# Patient Record
Sex: MALE | Race: WHITE | NOT HISPANIC OR LATINO | URBAN - METROPOLITAN AREA
[De-identification: names, ages, dates, MRNs, and addresses within clinical notes are randomized per-mention and may not be internally consistent; named-entity substitution may affect disease eponyms.]

---

## 2018-07-30 ENCOUNTER — OUTPATIENT (OUTPATIENT)
Dept: OUTPATIENT SERVICES | Facility: HOSPITAL | Age: 73
LOS: 1 days | Discharge: HOME | End: 2018-07-30

## 2018-07-31 DIAGNOSIS — I10 ESSENTIAL (PRIMARY) HYPERTENSION: ICD-10-CM

## 2018-07-31 DIAGNOSIS — G46.3 BRAIN STEM STROKE SYNDROME: ICD-10-CM

## 2018-07-31 DIAGNOSIS — E87.5 HYPERKALEMIA: ICD-10-CM

## 2018-07-31 DIAGNOSIS — R26.89 OTHER ABNORMALITIES OF GAIT AND MOBILITY: ICD-10-CM

## 2018-08-14 ENCOUNTER — OUTPATIENT (OUTPATIENT)
Dept: OUTPATIENT SERVICES | Facility: HOSPITAL | Age: 73
LOS: 1 days | Discharge: HOME | End: 2018-08-14

## 2018-08-14 DIAGNOSIS — R26.89 OTHER ABNORMALITIES OF GAIT AND MOBILITY: ICD-10-CM

## 2018-08-14 DIAGNOSIS — G46.3 BRAIN STEM STROKE SYNDROME: ICD-10-CM

## 2018-08-28 ENCOUNTER — OUTPATIENT (OUTPATIENT)
Dept: OUTPATIENT SERVICES | Facility: HOSPITAL | Age: 73
LOS: 1 days | Discharge: HOME | End: 2018-08-28

## 2018-08-28 DIAGNOSIS — R26.89 OTHER ABNORMALITIES OF GAIT AND MOBILITY: ICD-10-CM

## 2018-08-28 DIAGNOSIS — G46.3 BRAIN STEM STROKE SYNDROME: ICD-10-CM

## 2018-08-28 DIAGNOSIS — E87.5 HYPERKALEMIA: ICD-10-CM

## 2018-08-28 DIAGNOSIS — I10 ESSENTIAL (PRIMARY) HYPERTENSION: ICD-10-CM

## 2018-09-17 ENCOUNTER — OUTPATIENT (OUTPATIENT)
Dept: OUTPATIENT SERVICES | Facility: HOSPITAL | Age: 73
LOS: 1 days | Discharge: HOME | End: 2018-09-17

## 2018-09-17 DIAGNOSIS — I10 ESSENTIAL (PRIMARY) HYPERTENSION: ICD-10-CM

## 2018-10-26 ENCOUNTER — OUTPATIENT (OUTPATIENT)
Dept: OUTPATIENT SERVICES | Facility: HOSPITAL | Age: 73
LOS: 1 days | Discharge: HOME | End: 2018-10-26

## 2018-10-26 DIAGNOSIS — R26.89 OTHER ABNORMALITIES OF GAIT AND MOBILITY: ICD-10-CM

## 2018-10-26 DIAGNOSIS — I10 ESSENTIAL (PRIMARY) HYPERTENSION: ICD-10-CM

## 2018-10-26 DIAGNOSIS — E87.5 HYPERKALEMIA: ICD-10-CM

## 2019-05-07 ENCOUNTER — OUTPATIENT (OUTPATIENT)
Dept: OUTPATIENT SERVICES | Facility: HOSPITAL | Age: 74
LOS: 1 days | Discharge: HOME | End: 2019-05-07

## 2019-05-07 DIAGNOSIS — I10 ESSENTIAL (PRIMARY) HYPERTENSION: ICD-10-CM

## 2019-05-07 DIAGNOSIS — G46.3 BRAIN STEM STROKE SYNDROME: ICD-10-CM

## 2019-05-07 DIAGNOSIS — E87.5 HYPERKALEMIA: ICD-10-CM

## 2019-05-26 ENCOUNTER — EMERGENCY (EMERGENCY)
Facility: HOSPITAL | Age: 74
LOS: 0 days | Discharge: HOME | End: 2019-05-26
Attending: EMERGENCY MEDICINE | Admitting: EMERGENCY MEDICINE
Payer: MEDICARE

## 2019-05-26 VITALS
DIASTOLIC BLOOD PRESSURE: 57 MMHG | TEMPERATURE: 98 F | HEART RATE: 60 BPM | SYSTOLIC BLOOD PRESSURE: 112 MMHG | RESPIRATION RATE: 20 BRPM | OXYGEN SATURATION: 100 %

## 2019-05-26 DIAGNOSIS — R33.9 RETENTION OF URINE, UNSPECIFIED: ICD-10-CM

## 2019-05-26 DIAGNOSIS — K59.00 CONSTIPATION, UNSPECIFIED: ICD-10-CM

## 2019-05-26 DIAGNOSIS — R10.9 UNSPECIFIED ABDOMINAL PAIN: ICD-10-CM

## 2019-05-26 LAB
ALBUMIN SERPL ELPH-MCNC: 4.5 G/DL — SIGNIFICANT CHANGE UP (ref 3.5–5.2)
ALP SERPL-CCNC: 105 U/L — SIGNIFICANT CHANGE UP (ref 30–115)
ALT FLD-CCNC: 15 U/L — SIGNIFICANT CHANGE UP (ref 0–41)
ANION GAP SERPL CALC-SCNC: 13 MMOL/L — SIGNIFICANT CHANGE UP (ref 7–14)
APPEARANCE UR: CLEAR — SIGNIFICANT CHANGE UP
AST SERPL-CCNC: 24 U/L — SIGNIFICANT CHANGE UP (ref 0–41)
BASOPHILS # BLD AUTO: 0.01 K/UL — SIGNIFICANT CHANGE UP (ref 0–0.2)
BASOPHILS NFR BLD AUTO: 0.1 % — SIGNIFICANT CHANGE UP (ref 0–1)
BILIRUB SERPL-MCNC: 1 MG/DL — SIGNIFICANT CHANGE UP (ref 0.2–1.2)
BILIRUB UR-MCNC: NEGATIVE — SIGNIFICANT CHANGE UP
BUN SERPL-MCNC: 51 MG/DL — HIGH (ref 10–20)
CALCIUM SERPL-MCNC: 9.4 MG/DL — SIGNIFICANT CHANGE UP (ref 8.5–10.1)
CHLORIDE SERPL-SCNC: 101 MMOL/L — SIGNIFICANT CHANGE UP (ref 98–110)
CO2 SERPL-SCNC: 25 MMOL/L — SIGNIFICANT CHANGE UP (ref 17–32)
COLOR SPEC: YELLOW — SIGNIFICANT CHANGE UP
CREAT SERPL-MCNC: 2.1 MG/DL — HIGH (ref 0.7–1.5)
DIFF PNL FLD: NEGATIVE — SIGNIFICANT CHANGE UP
EOSINOPHIL # BLD AUTO: 0.04 K/UL — SIGNIFICANT CHANGE UP (ref 0–0.7)
EOSINOPHIL NFR BLD AUTO: 0.4 % — SIGNIFICANT CHANGE UP (ref 0–8)
EPI CELLS # UR: ABNORMAL /HPF
GLUCOSE SERPL-MCNC: 129 MG/DL — HIGH (ref 70–99)
GLUCOSE UR QL: NEGATIVE MG/DL — SIGNIFICANT CHANGE UP
HCT VFR BLD CALC: 39.7 % — LOW (ref 42–52)
HGB BLD-MCNC: 12.4 G/DL — LOW (ref 14–18)
IMM GRANULOCYTES NFR BLD AUTO: 0.2 % — SIGNIFICANT CHANGE UP (ref 0.1–0.3)
KETONES UR-MCNC: NEGATIVE — SIGNIFICANT CHANGE UP
LEUKOCYTE ESTERASE UR-ACNC: NEGATIVE — SIGNIFICANT CHANGE UP
LIDOCAIN IGE QN: 59 U/L — SIGNIFICANT CHANGE UP (ref 7–60)
LYMPHOCYTES # BLD AUTO: 1.47 K/UL — SIGNIFICANT CHANGE UP (ref 1.2–3.4)
LYMPHOCYTES # BLD AUTO: 16.2 % — LOW (ref 20.5–51.1)
MCHC RBC-ENTMCNC: 29.2 PG — SIGNIFICANT CHANGE UP (ref 27–31)
MCHC RBC-ENTMCNC: 31.2 G/DL — LOW (ref 32–37)
MCV RBC AUTO: 93.4 FL — SIGNIFICANT CHANGE UP (ref 80–94)
MONOCYTES # BLD AUTO: 0.94 K/UL — HIGH (ref 0.1–0.6)
MONOCYTES NFR BLD AUTO: 10.4 % — HIGH (ref 1.7–9.3)
NEUTROPHILS # BLD AUTO: 6.58 K/UL — HIGH (ref 1.4–6.5)
NEUTROPHILS NFR BLD AUTO: 72.7 % — SIGNIFICANT CHANGE UP (ref 42.2–75.2)
NITRITE UR-MCNC: NEGATIVE — SIGNIFICANT CHANGE UP
NRBC # BLD: 0 /100 WBCS — SIGNIFICANT CHANGE UP (ref 0–0)
PH UR: 6.5 — SIGNIFICANT CHANGE UP (ref 5–8)
PLATELET # BLD AUTO: 183 K/UL — SIGNIFICANT CHANGE UP (ref 130–400)
POTASSIUM SERPL-MCNC: 4.7 MMOL/L — SIGNIFICANT CHANGE UP (ref 3.5–5)
POTASSIUM SERPL-SCNC: 4.7 MMOL/L — SIGNIFICANT CHANGE UP (ref 3.5–5)
PROT SERPL-MCNC: 7.4 G/DL — SIGNIFICANT CHANGE UP (ref 6–8)
PROT UR-MCNC: 30 MG/DL
RBC # BLD: 4.25 M/UL — LOW (ref 4.7–6.1)
RBC # FLD: 15.3 % — HIGH (ref 11.5–14.5)
RBC CASTS # UR COMP ASSIST: ABNORMAL /HPF
SODIUM SERPL-SCNC: 139 MMOL/L — SIGNIFICANT CHANGE UP (ref 135–146)
SP GR SPEC: 1.01 — SIGNIFICANT CHANGE UP (ref 1.01–1.03)
UROBILINOGEN FLD QL: 0.2 MG/DL — SIGNIFICANT CHANGE UP (ref 0.2–0.2)
WBC # BLD: 9.06 K/UL — SIGNIFICANT CHANGE UP (ref 4.8–10.8)
WBC # FLD AUTO: 9.06 K/UL — SIGNIFICANT CHANGE UP (ref 4.8–10.8)

## 2019-05-26 PROCEDURE — 74177 CT ABD & PELVIS W/CONTRAST: CPT | Mod: 26

## 2019-05-26 PROCEDURE — 99284 EMERGENCY DEPT VISIT MOD MDM: CPT

## 2019-05-26 PROCEDURE — 76770 US EXAM ABDO BACK WALL COMP: CPT | Mod: 26

## 2019-05-26 RX ORDER — LACTULOSE 10 G/15ML
20 SOLUTION ORAL ONCE
Refills: 0 | Status: COMPLETED | OUTPATIENT
Start: 2019-05-26 | End: 2019-05-26

## 2019-05-26 RX ADMIN — LACTULOSE 20 GRAM(S): 10 SOLUTION ORAL at 17:58

## 2019-05-26 NOTE — CONSULT NOTE ADULT - SUBJECTIVE AND OBJECTIVE BOX
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 ~ 2.5), kidney stones presented to the hospital with abdominal distension, constipation and urinary incontinence. Pt mentions that he has not passed stools for last few days and has also noticed that he has involuntary urinary incontinence but can not pass urine on will. Denies any SOB, CP, n/v/f.    PAST MEDICAL & SURGICAL HISTORY:  Kidney stones  High cholesterol  HTN (hypertension)    Allergies:  No Known Allergies    Home Medications:    Hospital Medications:   MEDICATIONS  (STANDING):      SOCIAL HISTORY:  Denies ETOH,Smoking,   FAMILY HISTORY:        REVIEW OF SYSTEMS:     All other review of systems is negative unless indicated above.    VITALS:  T(F): 97.8 (05-26-19 @ 10:48), Max: 97.8 (05-26-19 @ 10:48)  HR: 60 (05-26-19 @ 10:48)  BP: 112/57 (05-26-19 @ 10:48)  RR: 20 (05-26-19 @ 10:48)  SpO2: 100% (05-26-19 @ 10:48)        I&O's Detail        PHYSICAL EXAM:  Constitutional: NAD  Respiratory: CTAB, no wheezes, rales or rhonchi  Cardiovascular: S1, S2, RRR  Gastrointestinal: BS+, soft, Distension+, mild supra pubic tenderness.  Extremities: No peripheral edema  Neurological: A/O x 3  : No sosa.     Vascular Access:    LABS:  05-26    139  |  101  |  51<H>  ----------------------------<  129<H>  4.7   |  25  |  2.1<H>    Ca    9.4      26 May 2019 12:23    TPro  7.4  /  Alb  4.5  /  TBili  1.0  /  DBili      /  AST  24  /  ALT  15  /  AlkPhos  105  05-26    Creatinine Trend: 2.1 <--                        12.4   9.06  )-----------( 183      ( 26 May 2019 12:23 )             39.7       Urine Studies:              RADIOLOGY & ADDITIONAL STUDIES:

## 2019-05-26 NOTE — ED PROVIDER NOTE - PHYSICAL EXAMINATION
VITAL SIGNS: AFebrile, vital signs stable  CONSTITUTIONAL: Well-developed; well-nourished; in no acute distress.  SKIN: Skin exam is warm and dry, no acute rash.  HEAD: Normocephalic; atraumatic.  EYES: Pupils equal round reactive to light, Extraocular movements intact; conjunctiva and sclera clear.  ENT: No nasal discharge; airway clear. Moist mucus membranes.  NECK: Supple; non tender. No rigidity  CARD: Regular rate and rhythm. Normal S1, S2; no murmurs, gallops, or rubs.  RESP: Lungs clear to auscultation bilaterally. No wheezes, rales or rhonchi.  ABD: Abdomen soft; non-tender; +mild distended;  no hepatosplenomegaly. No costovertebral angle tenderness.   EXT: Normal ROM. No clubbing, cyanosis or edema. No calf tenderness to palpation.  NEURO: Alert and oriented x 3. No focal deficits.  PSYCH: Cooperative, appropriate.

## 2019-05-26 NOTE — ED PROVIDER NOTE - NS ED ROS FT
Review of Systems:  •	CONSTITUTIONAL - No fever, No diaphoresis, No weight change  •	SKIN - No rash  •	HEMATOLOGIC - No abnormal bleeding or bruising  •	EYES - No eye pain, No blurred vision  •	ENT - No change in hearing, No sore throat, No neck pain, No rhinorrhea, No ear pain  •	RESPIRATORY - No shortness of breath, No cough  •	CARDIAC -No chest pain, No palpitations  •	GI -+ abdominal pain, No nausea, No vomiting, No diarrhea, + constipation, No bright red blood per rectum or melena. No flank pain  •                 - No dysuria, frequency, hematuria. +retention  •	ENDO - No polydypsia, No polyuria, No heat/cold intolerance  •	MUSCULOSKELETAL - No joint paint, No swelling, No back pain  •	NEUROLOGIC - No numbness, No focal weakness, No headache, No dizziness  All other systems negative, unless specified in HPI

## 2019-05-26 NOTE — ED PROVIDER NOTE - PROGRESS NOTE DETAILS
CT no SBO. Rectal w RN chaperone Liliana with +Stool impaction, pt was manually disimpacted with large amount stool, still able to feel stool in vault but pt wants to stop and try on his own for the rest. Dr Kleiner at bedside, agrees w enema/lactulose, requesting bladder/renal sono w PVR because he doesn't want pt to have bladder obstruction due to stool burden and become uroseptic or KEEGAN. pt has known CKD/DM.

## 2019-05-26 NOTE — ED ADULT NURSE NOTE - OBJECTIVE STATEMENT
Patient presents with lower abdominal pain and states no bm for the past few days. Additional complaint includes problems with urination. Denies any nausea or vomiting at this time.

## 2019-05-26 NOTE — ED ADULT NURSE NOTE - NSIMPLEMENTINTERV_GEN_ALL_ED
Implemented All Universal Safety Interventions:  [place] to call system. Call bell, personal items and telephone within reach. Instruct patient to call for assistance. Room bathroom lighting operational. Non-slip footwear when patient is off stretcher. Physically safe environment: no spills, clutter or unnecessary equipment. Stretcher in lowest position, wheels locked, appropriate side rails in place.

## 2019-05-26 NOTE — ED PROVIDER NOTE - CARE PLAN
Principal Discharge DX:	Constipation  Secondary Diagnosis:	Urinary retention Principal Discharge DX:	Constipation  Assessment and plan of treatment:	a/p: constipation/abd pain, urinary retention possibly 2/2 constipatoin, will do labs, ua r/o uti, CT a/p r/o SBO, rectal exam eval for impaction and disimpact if necessary, dr kleiner in ED providing consultation  Secondary Diagnosis:	Urinary retention

## 2019-05-26 NOTE — ED PROVIDER NOTE - OBJECTIVE STATEMENT
73M PMH CAD stent CKD DM, kidney stones, HTN, HL, p/w 4 days diffuse bloating abd pain and constipation. constant. no BM x 4 days. passing little gas. no nvd. 2 weeks ago took milk of mag for constipation which helped. however these past 4 days has done mirilax, milk of mag several times w no improvement. now feels he has urinary retention w dribbling and overflow incontinence. told dr kleiner about this and was instructed to come to ED r/o SBO and urinary retention. no fever. no cp, sob. no dysuria, freq, hematuria. no back pain, numbness, weakness, saddle anesthesia. Per Dr Kleiner  Cr is 2 at baseline and can get CT a/p w contrast if it baseline. no prior abd surgeries. no hx BPH.

## 2019-05-26 NOTE — CONSULT NOTE ADULT - ASSESSMENT
Patient with CKD 3/4 presented to hospital for abdominal distension, constipation, urinary incontinence  PMH HTN, DM, DLD, CKD 3/4, kidney stones.    # CKD 3/4   - baseline creatinine ~ 2.5   - serum creatinine stable around baseline.   - positive abd distension plus mild suprapubic tenderness suggestive of urinary retention with over incontinence   - Place sosa, check CT abdomen to r/o bowel obstruction plus urinary retention    - trend creatinine    - BP at goal, resume home meds, monitor BP   - Hb at goal.   - Ca at goal   - Avoid nephrotoxins and hypotension   - will follow

## 2019-05-26 NOTE — ED PROVIDER NOTE - CLINICAL SUMMARY MEDICAL DECISION MAKING FREE TEXT BOX
pt had 2 Large BM and 2 normal urinary output after disimpaction. Pending read of bladder sono. Spoke w Dr Kleiner, states he will follow it up himself tomorrow. pt to see dr kleiner tomorrow in clinic. strict return precautions provided. diet modification advised. pt had 2 Large normal BM and 2 normal urinary output after disimpaction. no need for enema. lactulose given. Pending read of bladder sono. Spoke w Dr Kleiner, states he will follow it up himself tomorrow. pt to see dr kleiner tomorrow in clinic. strict return precautions provided. diet modification advised.

## 2019-05-26 NOTE — ED PROVIDER NOTE - CARE PROVIDER_API CALL
Kleiner, Morton J (MD)  Internal Medicine; Nephrology  [address] [place] [address]  Phone: [phone]  Fax: [phone]  Follow Up Time:

## 2019-05-26 NOTE — CONSULT NOTE ADULT - ATTENDING COMMENTS
seen with fellow. CT abdomen noted. Pt is incontinent. continue laxatives, needs GI consult. sonogram of bladder with post void status. may provide food, may require urology consult. seen with fellow. CT abdomen noted. Pt is incontinent. continue laxatives, needs GI consult. sonogram of bladder with post void status. may provide food, may require urology consult. on examination. abdomen is distended, with good bowel sounds. tenderness over bladder, that's why he needs sonogram.

## 2019-05-26 NOTE — ED PROVIDER NOTE - PLAN OF CARE
a/p: constipation/abd pain, urinary retention possibly 2/2 constipatoin, will do labs, ua r/o uti, CT a/p r/o SBO, rectal exam eval for impaction and disimpact if necessary, dr kleiner in ED providing consultation

## 2019-05-26 NOTE — ED PROVIDER NOTE - NSFOLLOWUPINSTRUCTIONS_ED_ALL_ED_FT
Constipation    Constipation is when a person has fewer than three bowel movements a week, has difficulty having a bowel movement, or has stools that are dry, hard, or larger than normal. Other symptoms can include abdominal pain or bloating. As people grow older, constipation is more common. A low-fiber diet, not taking in enough fluids, and taking certain medicines may make constipation worse. Treatment varies but may include dietary modifications (more fiber-rich foods), lifestyle modifications, and possible medications.     SEEK IMMEDIATE MEDICAL CARE IF YOU HAVE THE FOLLOWING SYMPTOMS: bright red blood in your stool, constipation for longer than 4 days, abdominal or rectal pain, unexplained weight loss, or inability to pass gas.     Urinary Retention    Acute urinary retention is the temporary inability to urinate. This is a common problem in older men. As men age their prostates become larger and block the flow of urine from the bladder. If you are sent home with a sosa catheter and a drainage system make sure to keep the drainage bag emptied and lower than your catheter. Keep the sosa catheter in until you follow up with a urologist.    There are two main types of drainage bags. One is a large bag that usually is used at night. It has a good capacity that will allow you to sleep through the night without having to empty it. The second type is called a leg bag. It has a smaller capacity, so it needs to be emptied more frequently. However, the main advantage is that it can be attached by a leg strap and can go underneath your clothing, allowing you the freedom to move about or leave your home.     SEEK IMMEDIATE MEDICAL CARE IF YOU DEVELOP THE FOLLOWING SYMPTOMS: the catheter stops draining urine, the catheter falls out, abdominal pain, nausea/vomiting, or chills/fever.

## 2019-05-28 ENCOUNTER — OUTPATIENT (OUTPATIENT)
Dept: OUTPATIENT SERVICES | Facility: HOSPITAL | Age: 74
LOS: 1 days | Discharge: HOME | End: 2019-05-28

## 2019-05-29 DIAGNOSIS — I10 ESSENTIAL (PRIMARY) HYPERTENSION: ICD-10-CM

## 2019-05-29 DIAGNOSIS — E87.5 HYPERKALEMIA: ICD-10-CM

## 2019-05-29 DIAGNOSIS — G46.3 BRAIN STEM STROKE SYNDROME: ICD-10-CM

## 2019-05-29 DIAGNOSIS — R26.89 OTHER ABNORMALITIES OF GAIT AND MOBILITY: ICD-10-CM

## 2019-05-29 PROBLEM — E78.00 PURE HYPERCHOLESTEROLEMIA, UNSPECIFIED: Chronic | Status: ACTIVE | Noted: 2019-05-26

## 2019-05-29 PROBLEM — N20.0 CALCULUS OF KIDNEY: Chronic | Status: ACTIVE | Noted: 2019-05-26

## 2019-08-27 ENCOUNTER — OUTPATIENT (OUTPATIENT)
Dept: OUTPATIENT SERVICES | Facility: HOSPITAL | Age: 74
LOS: 1 days | Discharge: HOME | End: 2019-08-27

## 2019-08-28 DIAGNOSIS — E87.5 HYPERKALEMIA: ICD-10-CM

## 2019-08-28 DIAGNOSIS — G46.3 BRAIN STEM STROKE SYNDROME: ICD-10-CM

## 2019-08-28 DIAGNOSIS — I10 ESSENTIAL (PRIMARY) HYPERTENSION: ICD-10-CM

## 2019-08-28 DIAGNOSIS — R26.89 OTHER ABNORMALITIES OF GAIT AND MOBILITY: ICD-10-CM

## 2019-11-08 ENCOUNTER — OUTPATIENT (OUTPATIENT)
Dept: OUTPATIENT SERVICES | Facility: HOSPITAL | Age: 74
LOS: 1 days | Discharge: HOME | End: 2019-11-08

## 2019-11-08 DIAGNOSIS — G46.3 BRAIN STEM STROKE SYNDROME: ICD-10-CM

## 2019-11-08 DIAGNOSIS — R26.89 OTHER ABNORMALITIES OF GAIT AND MOBILITY: ICD-10-CM

## 2019-11-08 DIAGNOSIS — E87.5 HYPERKALEMIA: ICD-10-CM

## 2019-11-08 DIAGNOSIS — I10 ESSENTIAL (PRIMARY) HYPERTENSION: ICD-10-CM

## 2020-03-18 ENCOUNTER — TRANSCRIPTION ENCOUNTER (OUTPATIENT)
Age: 75
End: 2020-03-18

## 2020-03-25 ENCOUNTER — INPATIENT (INPATIENT)
Facility: HOSPITAL | Age: 75
LOS: 24 days | Discharge: HOME HEALTH | End: 2020-04-19
Attending: INTERNAL MEDICINE | Admitting: INTERNAL MEDICINE
Payer: MEDICARE

## 2020-03-25 VITALS
RESPIRATION RATE: 18 BRPM | OXYGEN SATURATION: 100 % | HEART RATE: 73 BPM | TEMPERATURE: 100 F | DIASTOLIC BLOOD PRESSURE: 70 MMHG | SYSTOLIC BLOOD PRESSURE: 156 MMHG

## 2020-03-25 PROCEDURE — 93010 ELECTROCARDIOGRAM REPORT: CPT

## 2020-03-25 PROCEDURE — 99291 CRITICAL CARE FIRST HOUR: CPT

## 2020-03-25 NOTE — ED ADULT NURSE NOTE - OBJECTIVE STATEMENT
patient complaints of SOB and cough for few days. Patient denies chest pain, n/v and reports a low grade fever at home.

## 2020-03-25 NOTE — ED ADULT NURSE NOTE - CHPI ED NUR SYMPTOMS NEG
no decreased eating/drinking/no chills/no dizziness/no fever/no pain/no vomiting/no nausea/no tingling

## 2020-03-26 DIAGNOSIS — Z95.5 PRESENCE OF CORONARY ANGIOPLASTY IMPLANT AND GRAFT: Chronic | ICD-10-CM

## 2020-03-26 LAB
ALBUMIN SERPL ELPH-MCNC: 3.4 G/DL — LOW (ref 3.5–5.2)
ALBUMIN SERPL ELPH-MCNC: 3.6 G/DL — SIGNIFICANT CHANGE UP (ref 3.5–5.2)
ALP SERPL-CCNC: 100 U/L — SIGNIFICANT CHANGE UP (ref 30–115)
ALP SERPL-CCNC: 97 U/L — SIGNIFICANT CHANGE UP (ref 30–115)
ALT FLD-CCNC: 45 U/L — HIGH (ref 0–41)
ALT FLD-CCNC: 47 U/L — HIGH (ref 0–41)
ANION GAP SERPL CALC-SCNC: 16 MMOL/L — HIGH (ref 7–14)
ANION GAP SERPL CALC-SCNC: 18 MMOL/L — HIGH (ref 7–14)
APPEARANCE UR: CLEAR — SIGNIFICANT CHANGE UP
AST SERPL-CCNC: 119 U/L — HIGH (ref 0–41)
AST SERPL-CCNC: 120 U/L — HIGH (ref 0–41)
BASE EXCESS BLDV CALC-SCNC: 0.8 MMOL/L — SIGNIFICANT CHANGE UP (ref -2–2)
BILIRUB SERPL-MCNC: 0.8 MG/DL — SIGNIFICANT CHANGE UP (ref 0.2–1.2)
BILIRUB SERPL-MCNC: 1.1 MG/DL — SIGNIFICANT CHANGE UP (ref 0.2–1.2)
BILIRUB UR-MCNC: NEGATIVE — SIGNIFICANT CHANGE UP
BUN SERPL-MCNC: 73 MG/DL — CRITICAL HIGH (ref 10–20)
BUN SERPL-MCNC: 75 MG/DL — CRITICAL HIGH (ref 10–20)
CA-I SERPL-SCNC: 1.12 MMOL/L — SIGNIFICANT CHANGE UP (ref 1.12–1.3)
CALCIUM SERPL-MCNC: 8.1 MG/DL — LOW (ref 8.5–10.1)
CALCIUM SERPL-MCNC: 8.5 MG/DL — SIGNIFICANT CHANGE UP (ref 8.5–10.1)
CHLORIDE SERPL-SCNC: 96 MMOL/L — LOW (ref 98–110)
CHLORIDE SERPL-SCNC: 99 MMOL/L — SIGNIFICANT CHANGE UP (ref 98–110)
CO2 SERPL-SCNC: 21 MMOL/L — SIGNIFICANT CHANGE UP (ref 17–32)
CO2 SERPL-SCNC: 23 MMOL/L — SIGNIFICANT CHANGE UP (ref 17–32)
COLOR SPEC: SIGNIFICANT CHANGE UP
CREAT SERPL-MCNC: 3.6 MG/DL — HIGH (ref 0.7–1.5)
CREAT SERPL-MCNC: 4.2 MG/DL — CRITICAL HIGH (ref 0.7–1.5)
DIFF PNL FLD: NEGATIVE — SIGNIFICANT CHANGE UP
FLU A RESULT: NEGATIVE — SIGNIFICANT CHANGE UP
FLU A RESULT: NEGATIVE — SIGNIFICANT CHANGE UP
FLUAV AG NPH QL: NEGATIVE — SIGNIFICANT CHANGE UP
FLUBV AG NPH QL: NEGATIVE — SIGNIFICANT CHANGE UP
GAS PNL BLDV: 138 MMOL/L — SIGNIFICANT CHANGE UP (ref 136–145)
GAS PNL BLDV: SIGNIFICANT CHANGE UP
GLUCOSE SERPL-MCNC: 123 MG/DL — HIGH (ref 70–99)
GLUCOSE SERPL-MCNC: 133 MG/DL — HIGH (ref 70–99)
GLUCOSE UR QL: NEGATIVE — SIGNIFICANT CHANGE UP
HCO3 BLDV-SCNC: 26 MMOL/L — SIGNIFICANT CHANGE UP (ref 22–29)
HCT VFR BLD CALC: 39.4 % — LOW (ref 42–52)
HCT VFR BLDA CALC: 21.6 % — LOW (ref 34–44)
HCV AB S/CO SERPL IA: 0.04 COI — SIGNIFICANT CHANGE UP
HCV AB SERPL-IMP: SIGNIFICANT CHANGE UP
HGB BLD CALC-MCNC: 7.1 G/DL — LOW (ref 14–18)
HGB BLD-MCNC: 12.7 G/DL — LOW (ref 14–18)
KETONES UR-MCNC: NEGATIVE — SIGNIFICANT CHANGE UP
LACTATE BLDV-MCNC: 1.6 MMOL/L — SIGNIFICANT CHANGE UP (ref 0.5–1.6)
LEUKOCYTE ESTERASE UR-ACNC: NEGATIVE — SIGNIFICANT CHANGE UP
MCHC RBC-ENTMCNC: 29.3 PG — SIGNIFICANT CHANGE UP (ref 27–31)
MCHC RBC-ENTMCNC: 32.2 G/DL — SIGNIFICANT CHANGE UP (ref 32–37)
MCV RBC AUTO: 90.8 FL — SIGNIFICANT CHANGE UP (ref 80–94)
NITRITE UR-MCNC: NEGATIVE — SIGNIFICANT CHANGE UP
NRBC # BLD: 0 /100 WBCS — SIGNIFICANT CHANGE UP (ref 0–0)
PCO2 BLDV: 46 MMHG — SIGNIFICANT CHANGE UP (ref 41–51)
PH BLDV: 7.36 — SIGNIFICANT CHANGE UP (ref 7.26–7.43)
PH UR: 6 — SIGNIFICANT CHANGE UP (ref 5–8)
PLATELET # BLD AUTO: 190 K/UL — SIGNIFICANT CHANGE UP (ref 130–400)
PO2 BLDV: 22 MMHG — SIGNIFICANT CHANGE UP (ref 20–40)
POTASSIUM BLDV-SCNC: 3.9 MMOL/L — SIGNIFICANT CHANGE UP (ref 3.3–5.6)
POTASSIUM SERPL-MCNC: 4 MMOL/L — SIGNIFICANT CHANGE UP (ref 3.5–5)
POTASSIUM SERPL-MCNC: 4.4 MMOL/L — SIGNIFICANT CHANGE UP (ref 3.5–5)
POTASSIUM SERPL-SCNC: 4 MMOL/L — SIGNIFICANT CHANGE UP (ref 3.5–5)
POTASSIUM SERPL-SCNC: 4.4 MMOL/L — SIGNIFICANT CHANGE UP (ref 3.5–5)
PROT SERPL-MCNC: 6.1 G/DL — SIGNIFICANT CHANGE UP (ref 6–8)
PROT SERPL-MCNC: 6.6 G/DL — SIGNIFICANT CHANGE UP (ref 6–8)
PROT UR-MCNC: NEGATIVE — SIGNIFICANT CHANGE UP
RBC # BLD: 4.34 M/UL — LOW (ref 4.7–6.1)
RBC # FLD: 14.2 % — SIGNIFICANT CHANGE UP (ref 11.5–14.5)
RSV RESULT: NEGATIVE — SIGNIFICANT CHANGE UP
RSV RNA RESP QL NAA+PROBE: NEGATIVE — SIGNIFICANT CHANGE UP
SAO2 % BLDV: 33 % — SIGNIFICANT CHANGE UP
SARS-COV-2 RNA SPEC QL NAA+PROBE: DETECTED
SODIUM SERPL-SCNC: 135 MMOL/L — SIGNIFICANT CHANGE UP (ref 135–146)
SODIUM SERPL-SCNC: 138 MMOL/L — SIGNIFICANT CHANGE UP (ref 135–146)
SODIUM UR-SCNC: 44 MMOL/L — SIGNIFICANT CHANGE UP
SP GR SPEC: 1.02 — SIGNIFICANT CHANGE UP (ref 1.01–1.02)
UROBILINOGEN FLD QL: SIGNIFICANT CHANGE UP
WBC # BLD: 8.36 K/UL — SIGNIFICANT CHANGE UP (ref 4.8–10.8)
WBC # FLD AUTO: 8.36 K/UL — SIGNIFICANT CHANGE UP (ref 4.8–10.8)

## 2020-03-26 PROCEDURE — 71045 X-RAY EXAM CHEST 1 VIEW: CPT | Mod: 26

## 2020-03-26 PROCEDURE — 99223 1ST HOSP IP/OBS HIGH 75: CPT | Mod: AI

## 2020-03-26 RX ORDER — HYDROXYCHLOROQUINE SULFATE 200 MG
400 TABLET ORAL EVERY 12 HOURS
Refills: 0 | Status: COMPLETED | OUTPATIENT
Start: 2020-03-26 | End: 2020-03-26

## 2020-03-26 RX ORDER — AZITHROMYCIN 500 MG/1
500 TABLET, FILM COATED ORAL ONCE
Refills: 0 | Status: COMPLETED | OUTPATIENT
Start: 2020-03-26 | End: 2020-03-26

## 2020-03-26 RX ORDER — METOPROLOL TARTRATE 50 MG
25 TABLET ORAL
Refills: 0 | Status: DISCONTINUED | OUTPATIENT
Start: 2020-03-26 | End: 2020-04-02

## 2020-03-26 RX ORDER — CEFTRIAXONE 500 MG/1
1000 INJECTION, POWDER, FOR SOLUTION INTRAMUSCULAR; INTRAVENOUS ONCE
Refills: 0 | Status: COMPLETED | OUTPATIENT
Start: 2020-03-26 | End: 2020-03-26

## 2020-03-26 RX ORDER — HYDROXYCHLOROQUINE SULFATE 200 MG
200 TABLET ORAL EVERY 12 HOURS
Refills: 0 | Status: COMPLETED | OUTPATIENT
Start: 2020-03-27 | End: 2020-03-31

## 2020-03-26 RX ORDER — AMLODIPINE BESYLATE 2.5 MG/1
5 TABLET ORAL ONCE
Refills: 0 | Status: COMPLETED | OUTPATIENT
Start: 2020-03-26 | End: 2020-03-26

## 2020-03-26 RX ORDER — HYDROXYCHLOROQUINE SULFATE 200 MG
TABLET ORAL
Refills: 0 | Status: COMPLETED | OUTPATIENT
Start: 2020-03-26 | End: 2020-03-31

## 2020-03-26 RX ORDER — ATORVASTATIN CALCIUM 80 MG/1
40 TABLET, FILM COATED ORAL AT BEDTIME
Refills: 0 | Status: DISCONTINUED | OUTPATIENT
Start: 2020-03-26 | End: 2020-04-05

## 2020-03-26 RX ORDER — SODIUM CHLORIDE 9 MG/ML
1000 INJECTION INTRAMUSCULAR; INTRAVENOUS; SUBCUTANEOUS
Refills: 0 | Status: DISCONTINUED | OUTPATIENT
Start: 2020-03-26 | End: 2020-03-29

## 2020-03-26 RX ORDER — CLOPIDOGREL BISULFATE 75 MG/1
75 TABLET, FILM COATED ORAL DAILY
Refills: 0 | Status: DISCONTINUED | OUTPATIENT
Start: 2020-03-26 | End: 2020-04-18

## 2020-03-26 RX ADMIN — Medication 25 MILLIGRAM(S): at 18:24

## 2020-03-26 RX ADMIN — AMLODIPINE BESYLATE 5 MILLIGRAM(S): 2.5 TABLET ORAL at 22:26

## 2020-03-26 RX ADMIN — Medication 400 MILLIGRAM(S): at 12:09

## 2020-03-26 RX ADMIN — Medication 400 MILLIGRAM(S): at 23:39

## 2020-03-26 RX ADMIN — Medication 25 MILLIGRAM(S): at 06:08

## 2020-03-26 RX ADMIN — CEFTRIAXONE 100 MILLIGRAM(S): 500 INJECTION, POWDER, FOR SOLUTION INTRAMUSCULAR; INTRAVENOUS at 02:25

## 2020-03-26 RX ADMIN — CLOPIDOGREL BISULFATE 75 MILLIGRAM(S): 75 TABLET, FILM COATED ORAL at 12:09

## 2020-03-26 RX ADMIN — AZITHROMYCIN 255 MILLIGRAM(S): 500 TABLET, FILM COATED ORAL at 01:25

## 2020-03-26 RX ADMIN — SODIUM CHLORIDE 75 MILLILITER(S): 9 INJECTION INTRAMUSCULAR; INTRAVENOUS; SUBCUTANEOUS at 03:53

## 2020-03-26 RX ADMIN — ATORVASTATIN CALCIUM 40 MILLIGRAM(S): 80 TABLET, FILM COATED ORAL at 22:26

## 2020-03-26 NOTE — ED PROVIDER NOTE - CLINICAL SUMMARY MEDICAL DECISION MAKING FREE TEXT BOX
pt admitted for hypoxia in setting PNA likely viral, covered with abx for CAP, spoke with renal fellow who will see in AM for acute on chronic renal insufficiency, pt admitted for O2 support and further workup. Dr. Kleiner paged, no call back received but spoke with fellow.

## 2020-03-26 NOTE — H&P ADULT - HISTORY OF PRESENT ILLNESS
73 yo male with PMH HTN, HLD, CAD s/p stent presents c/o cough x 1 week. Pt states he had low grade temp and has had slight decreased appetite, was started on an oral antibiotic by his PMD. Today was having some SOB and Jarrett was called and noted him to be hypoxic. Pt denies any chest pain, palpitations, N/V/D or abdominal pain. Wife also with URI sx. No recent travel.  Pt also found to have elevated Creatinine

## 2020-03-26 NOTE — H&P ADULT - NSHPPHYSICALEXAM_GEN_ALL_CORE
VITALS:   T(F): 99.7  HR: 73  BP: 156/70  RR: 18  SpO2: 100%    PHYSICAL EXAM:  GEN: No acute distress  LUNGS: Clear to auscultation bilaterally   HEART: S1/S2 present. RRR.   ABD: Soft, non-tender, non-distended. Bowel sounds present  EXT: NC/NC/NE/SHEPHERD  NEURO: AAOX3

## 2020-03-26 NOTE — ED ADULT NURSE REASSESSMENT NOTE - CONDITION
Patient is alert and oriented x 4. Patient ambulated to wash his hands after using urinal. O2 saturation decreased to 84% after walking approx 3 feet on room air. Patient states he does not feel short of breath. Oxygen reapplied and o2 saturation increased to 98% on 4l nc. Patient remains comfortable and afebrile. Will continue to monitor for changes in status./unchanged

## 2020-03-26 NOTE — H&P ADULT - ATTENDING COMMENTS
73 YO M with a PMH of HTN, HLD, CAD s/p stent, and CKD3 who presents to the hospital with a c/o non-productive cough for the past x 1 week. Associated with SOB and subjective fevers for the past x 1 day. Denies any sore throat, runny nose, CP, palpitations, dysuria, flank pain, LE swelling, or N/V/D. + sick contact (wife with similar symptoms). Retired. No recent travel. In the ED, Chest X-Ray with extensive B/L interstitial opacities concerning for viral pneumonia (no official read). High supplemental O2 requirements. Started on IV ABXs (Ceftriaxone/Azithro). Also, noted to have KEEGAN. The ED reached out to renal fellow who will see the pt in the AM. Pt isolated and COVID19 swabs sent.     Physical exam shows pt in NAD. VSS, afebrile, not hypoxic on 5L NC. A&Ox3. Non-focal neuro exam. Muscle strength/sensation intact. Fine rales heard throughout all lung fields with mild rhonchi. RRR, no M/G/R. ABD is soft and non-tender, normoactive BSs. LEs with trace pitting edema. No rashes. Labs and radiology as above.     SOB + Fevers + Cough likely due to viral respiratory syndrome + acute hypoxic respiratory failure, needs COVID19 rule out. - Recent travel. - COVID19 contact. Admit to COVID19 isolation unit. COVID19 swab sent. Isolate pt. Send Flu/RSV and RVP. Send CRP and procal. Obtain LDH & D-dimer. Trend CBC and LFTs. Hepatitis panel. Send ferritin, fibrinogen, and TGs. FU Chest XR. Start on hydroxychloroquine (CXR changes + acute hypoxic respiratory failure requiring supplemental O2). IV ABxs (Ceftriaxone/Azithro). Obtain EKG. IVFs (LR). APAP PRN. Anti-tussives PRN. Supplemental O2 PRN. ID consult. C/w statins, if LFTs are not > 75. No NSAIDs.     Transaminitis from above. Management as above.     KEEGAN on CKD3, likely pre-renal; Doubt ATN. Renal is following. No indication currently for urgent RRT. Send UA, urine lytes, urine eosinophils, serum phos, and trend BMP. IVFs (LR). Monitor urinary out-put. Renal/bladder US. PTH and Vit D. Hold nephrotoxic agents.    Mild HAGMA likely from elevated uremic acid levels. IVFs (LR). Trend BMPs    Hx of HTN, HLD, and CAD s/p stent. Restart home meds, hold nephrotoxic agents. GI and DVT PPX. Inform PCP of pt's admission to hospital. Rest as per above note.

## 2020-03-26 NOTE — ED PROVIDER NOTE - CARE PLAN
Principal Discharge DX:	Pneumonia  Secondary Diagnosis:	Acute on chronic renal insufficiency  Secondary Diagnosis:	Dyspnea

## 2020-03-26 NOTE — ED PROVIDER NOTE - OBJECTIVE STATEMENT
75 yo male hx of HTN/HLD/kidney stones/ CAD present c/o worsening SOB over the past 2 days with coughing x 1 week. EMS found patient SaO2 at mid 80s so they brought him to E for evaluation. reported low grade temp. Wife has similar symptoms but doing well at home. denies recent travel and sick contact. Denies HA/dizziness/chest pain/abd pain/n/v/d and urinary sxs.

## 2020-03-26 NOTE — CONSULT NOTE ADULT - SUBJECTIVE AND OBJECTIVE BOX
NEPHROLOGY CONSULTATION NOTE    73 yo male with PMH HTN, HLD, CAD s/p stent presents c/o cough x 1 week. Pt states he had low grade temp and has had slight decreased appetite, was started on an oral antibiotic by his PMD. Today was having some SOB and [facility] was called and noted him to be hypoxic. Pt denies any chest pain, palpitations, N/V/D or abdominal pain. Wife also with URI sx. No recent travel.  Pt also found to have elevated Creatinine (5/26/2020)    no new complaints.    PAST MEDICAL & SURGICAL HISTORY:  CAD (coronary artery disease)  Kidney stones  High cholesterol  HTN (hypertension)  S/P drug eluting coronary stent placement: &gt; 10 years ago    Allergies:  No Known Allergies    Home Medications:  CLOPIDOGREL  75 MG TABS:  (26 Mar 2020 01:49)  FEBUXOSTAT  40 MG TABS:  (26 Mar 2020 01:49)  FUROSEMIDE  40 MG TABS:  (26 Mar 2020 01:49)  METOPROLOL TARTRATE  25 MG TABS:  (26 Mar 2020 01:49)  PIOGLITAZONE HYDROCHLORIDE  30 MG TABS:  (26 Mar 2020 01:49)  ROSUVASTATIN CALCIUM  20 MG TABS:  (26 Mar 2020 01:48)    Hospital Medications:   MEDICATIONS  (STANDING):  atorvastatin 40 milliGRAM(s) Oral at bedtime  clopidogrel Tablet 75 milliGRAM(s) Oral daily  hydroxychloroquine   Oral   hydroxychloroquine 400 milliGRAM(s) Oral every 12 hours  metoprolol tartrate 25 milliGRAM(s) Oral two times a day  sodium chloride 0.9%. 1000 milliLiter(s) (75 mL/Hr) IV Continuous <Continuous>      SOCIAL HISTORY:  Denies ETOH,Smoking,   FAMILY HISTORY:        REVIEW OF SYSTEMS:    All other review of systems is negative unless indicated above.    VITALS:  T(F): 98.7 (03-26-20 @ 06:08), Max: 99.7 (03-25-20 @ 22:32)  HR: 62 (03-26-20 @ 09:25)  BP: 121/65 (03-26-20 @ 09:25)  RR: 18 (03-26-20 @ 09:25)  SpO2: 95% (03-26-20 @ 09:25)        I&O's Detail        PHYSICAL EXAM:  Constitutional: NAD  Respiratory: CTAB, no wheezes, rales or rhonchi  Cardiovascular: S1, S2, RRR  Gastrointestinal: BS+, soft, NT/ND  Extremities: No peripheral edema  Neurological: A/O x 3  : No sosa.     Vascular Access:    LABS:  03-26    138  |  99  |  75<HH>  ----------------------------<  123<H>  4.0   |  21  |  3.6<H>    Ca    8.1<L>      26 Mar 2020 06:10    TPro  6.1  /  Alb  3.4<L>  /  TBili  0.8  /  DBili      /  AST  120<H>  /  ALT  47<H>  /  AlkPhos  97  03-26    Creatinine Trend: 3.6 <--, 4.2 <--                        12.7   8.36  )-----------( 190      ( 25 Mar 2020 23:55 )             39.4       Blood Gas Profile - Venous (03.26.20 @ 00:50)    pH, Venous: 7.36    pCO2, Venous: 46 mmHg    pO2, Venous: 22 mmHg    HCO3, Venous: 26 mmoL/L    Base Excess, Venous: 0.8 mmoL/L    Oxygen Saturation, Venous: 33 %      Urine Studies:    RADIOLOGY & ADDITIONAL STUDIES:  < from: US Retroperitoneal Complete (05.26.19 @ 19:34) >  IMPRESSION:    Unremarkable renal ultrasound.    Urinary bladder postvoid residual volume of 255 cc.    < end of copied text >    < from: Xray Chest 1 View AP/PA (03.26.20 @ 05:22) >  Impression:      Diffuse bilateral interstitial and airspace opacities. Consider viral etiology.    < end of copied text >    < from: CT Abdomen and Pelvis w/ IV Cont (05.26.19 @ 14:34) >  IMPRESSION:     No CT evidence for acute abdominopelvic pathology.    4 mm nonobstructing calculus in the right renal pelvis.    < end of copied text >

## 2020-03-26 NOTE — ED PROVIDER NOTE - PHYSICAL EXAMINATION
CONSTITUTIONAL: Well-appearing; well-nourished; in no apparent distress.   EYES: PERRL; EOM intact.   ENT: normal nose; no rhinorrhea; normal pharynx with no tonsillar hypertrophy.   NECK: Supple; non-tender; no cervical lymphadenopathy. No JVD.   CARDIOVASCULAR: + tachycardia. Normal S1, S2; no murmurs, rubs, or gallops.   RESPIRATORY: + LLL rales/rhonchi. Normal chest excursion with respiration; breath sounds clear and equal bilaterally;   GI/: Normal bowel sounds; non-distended; non-tender; no palpable organomegaly.   MS: Trace edema to b/l LE.   SKIN: Normal for age and race; warm; dry; good turgor; no apparent lesions or exudate.   NEURO/PSYCH: A & O x 4; grossly unremarkable.

## 2020-03-26 NOTE — H&P ADULT - ASSESSMENT
73 yo male with PMH HTN, HLD, CAD s/p stent presents c/o cough x 1 week    # Cough, sob, hypoxia. Rule out COVID 19 infection  - PCT /CRP  - 73 yo male with PMH HTN, HLD, CAD s/p stent presents c/o cough x 1 week    # Cough, sob, hypoxia. Rule out COVID 19 infection  - PCT /CRP  - monitor off abx  - start plaquenil ?    # KEEGAN on CKD  - Urine lytes  - Renal US  - Nephrology consult  - DC lasix  - IVF    # HTN / HLD / CAD  - home meds    # DVTppx

## 2020-03-26 NOTE — ED PROVIDER NOTE - NS ED ROS FT
Constitutional: + fever, chills, no recent weight loss, change in appetite or malaise  Eyes: no redness/discharge/pain/vision changes  ENT: no rhinorrhea/ear pain/sore throat  Cardiac: + SOB. chest pain or edema.  Respiratory: + cough No respiratory distress  GI: No nausea, vomiting, diarrhea or abdominal pain.  : No dysuria, frequency, urgency or hematuria  MS: no pain to back or extremities, no loss of ROM, no weakness  Neuro: No headache or weakness. No LOC.  Skin: No skin rash.  Endocrine: No history of thyroid disease or diabetes.

## 2020-03-26 NOTE — CONSULT NOTE ADULT - ASSESSMENT
73 y/o M with KEEGAN on CKD 3/4 in hospital for hypoxemia, cough, SOB.  PMH CAD s/p PCI, Kidney stones, DLD, HTN, CKD 3/4    # KEEGAN on CKD 3/4: baseline cr. ~ 2.1   - likely prerenal due to fever, decreased po intake, lasix.   - serum creatinine improved since admission.   - non-oliguric   - cont. IVF   - check UA, urine creatinine, NA, Urea, Pr/Cr ratio   - start po bicarb 325 mg q 12 hr.   - BP at goal, keep current   - Hb at goal.   - strict I/O   - trend creatinine   - unremarkable sono noted.   - avoid nephrotoxins and hypotension   - SOB/cough/ b/l opacities on CXR. work up for COVID-19 in progress. on hydroxychloroquine.   - will follow    Dr. Kleiner informed about patient status.

## 2020-03-26 NOTE — H&P ADULT - NSHPLABSRESULTS_GEN_ALL_CORE
LABS:                        12.7   8.36  )-----------( 190      ( 25 Mar 2020 23:55 )             39.4     03-25    135  |  96<L>  |  73<HH>  ----------------------------<  133<H>  4.4   |  23  |  4.2<HH>    Ca    8.5      25 Mar 2020 23:55    TPro  6.6  /  Alb  3.6  /  TBili  1.1  /  DBili  x   /  AST  119<H>  /  ALT  45<H>  /  AlkPhos  100  03-25                  RADIOLOGY:

## 2020-03-26 NOTE — ED PROVIDER NOTE - ATTENDING CONTRIBUTION TO CARE
73 yo male with PMH HTN, HLD, CAD s/p stent presents c/o cough x 1 week. Pt states he had low grade temp and has had slight decreased appetite, was started on an oral antibiotic by his PMD. Today was having some SOB and Jarrett was called and noted him to be hypoxic. Pt denies any chest pain, palpitations, N/V/D or abdominal pain. Wife also with URI sx. No recent travel. PMD is Dr. Kleiner.     VITAL SIGNS: noted  CONSTITUTIONAL: Well-developed; well-nourished; in no acute distress  HEAD: Normocephalic; atraumatic  EYES: PERRL, EOM intact; conjunctiva and sclera clear  ENT: No nasal discharge; airway clear. MMM  NECK: Supple; non tender. No anterior cervical lymphadenopathy noted  CARD: S1, S2 normal; no murmurs, gallops, or rubs. Regular rate and rhythm  RESP: slight rhonchi left base, no wheezing, normal WOB, speaking full sentences  ABD: Normal bowel sounds; soft; non-distended; non-tender; no CVA tenderness  EXT: Normal ROM. No calf tenderness or edema. Distal pulses intact  NEURO: Alert, oriented. Grossly unremarkable. No focal deficits  SKIN: Skin exam is warm and dry

## 2020-03-27 LAB
ALBUMIN SERPL ELPH-MCNC: 3.1 G/DL — LOW (ref 3.5–5.2)
ALP SERPL-CCNC: 101 U/L — SIGNIFICANT CHANGE UP (ref 30–115)
ALT FLD-CCNC: 51 U/L — HIGH (ref 0–41)
ANION GAP SERPL CALC-SCNC: 12 MMOL/L — SIGNIFICANT CHANGE UP (ref 7–14)
AST SERPL-CCNC: 109 U/L — HIGH (ref 0–41)
BASOPHILS # BLD AUTO: 0 K/UL — SIGNIFICANT CHANGE UP (ref 0–0.2)
BASOPHILS NFR BLD AUTO: 0 % — SIGNIFICANT CHANGE UP (ref 0–1)
BILIRUB DIRECT SERPL-MCNC: 0.3 MG/DL — HIGH (ref 0–0.2)
BILIRUB INDIRECT FLD-MCNC: 0.4 MG/DL — SIGNIFICANT CHANGE UP (ref 0.2–1.2)
BILIRUB SERPL-MCNC: 0.7 MG/DL — SIGNIFICANT CHANGE UP (ref 0.2–1.2)
BUN SERPL-MCNC: 59 MG/DL — HIGH (ref 10–20)
CALCIUM SERPL-MCNC: 8 MG/DL — LOW (ref 8.5–10.1)
CHLORIDE SERPL-SCNC: 105 MMOL/L — SIGNIFICANT CHANGE UP (ref 98–110)
CO2 SERPL-SCNC: 23 MMOL/L — SIGNIFICANT CHANGE UP (ref 17–32)
CREAT ?TM UR-MCNC: 36 MG/DL — SIGNIFICANT CHANGE UP
CREAT SERPL-MCNC: 3 MG/DL — HIGH (ref 0.7–1.5)
CRP SERPL-MCNC: 9.03 MG/DL — HIGH (ref 0–0.4)
EOSINOPHIL # BLD AUTO: 0.04 K/UL — SIGNIFICANT CHANGE UP (ref 0–0.7)
EOSINOPHIL NFR BLD AUTO: 0.5 % — SIGNIFICANT CHANGE UP (ref 0–8)
GLUCOSE SERPL-MCNC: 107 MG/DL — HIGH (ref 70–99)
HCT VFR BLD CALC: 38.1 % — LOW (ref 42–52)
HGB BLD-MCNC: 12.6 G/DL — LOW (ref 14–18)
IMM GRANULOCYTES NFR BLD AUTO: 0.7 % — HIGH (ref 0.1–0.3)
LYMPHOCYTES # BLD AUTO: 1.05 K/UL — LOW (ref 1.2–3.4)
LYMPHOCYTES # BLD AUTO: 14 % — LOW (ref 20.5–51.1)
MAGNESIUM SERPL-MCNC: 1.9 MG/DL — SIGNIFICANT CHANGE UP (ref 1.8–2.4)
MCHC RBC-ENTMCNC: 30 PG — SIGNIFICANT CHANGE UP (ref 27–31)
MCHC RBC-ENTMCNC: 33.1 G/DL — SIGNIFICANT CHANGE UP (ref 32–37)
MCV RBC AUTO: 90.7 FL — SIGNIFICANT CHANGE UP (ref 80–94)
MONOCYTES # BLD AUTO: 0.79 K/UL — HIGH (ref 0.1–0.6)
MONOCYTES NFR BLD AUTO: 10.6 % — HIGH (ref 1.7–9.3)
NEUTROPHILS # BLD AUTO: 5.55 K/UL — SIGNIFICANT CHANGE UP (ref 1.4–6.5)
NEUTROPHILS NFR BLD AUTO: 74.2 % — SIGNIFICANT CHANGE UP (ref 42.2–75.2)
NRBC # BLD: 0 /100 WBCS — SIGNIFICANT CHANGE UP (ref 0–0)
PLATELET # BLD AUTO: 202 K/UL — SIGNIFICANT CHANGE UP (ref 130–400)
POTASSIUM SERPL-MCNC: 3.7 MMOL/L — SIGNIFICANT CHANGE UP (ref 3.5–5)
POTASSIUM SERPL-SCNC: 3.7 MMOL/L — SIGNIFICANT CHANGE UP (ref 3.5–5)
PROCALCITONIN SERPL-MCNC: 0.28 NG/ML — HIGH (ref 0.02–0.1)
PROT ?TM UR-MCNC: 5 MG/DLG/24H — SIGNIFICANT CHANGE UP
PROT SERPL-MCNC: 5.9 G/DL — LOW (ref 6–8)
PROT/CREAT UR-RTO: 0.1 RATIO — SIGNIFICANT CHANGE UP (ref 0–0.2)
RBC # BLD: 4.2 M/UL — LOW (ref 4.7–6.1)
RBC # FLD: 14.1 % — SIGNIFICANT CHANGE UP (ref 11.5–14.5)
SODIUM SERPL-SCNC: 140 MMOL/L — SIGNIFICANT CHANGE UP (ref 135–146)
WBC # BLD: 7.48 K/UL — SIGNIFICANT CHANGE UP (ref 4.8–10.8)
WBC # FLD AUTO: 7.48 K/UL — SIGNIFICANT CHANGE UP (ref 4.8–10.8)

## 2020-03-27 PROCEDURE — 99233 SBSQ HOSP IP/OBS HIGH 50: CPT

## 2020-03-27 PROCEDURE — 93010 ELECTROCARDIOGRAM REPORT: CPT

## 2020-03-27 PROCEDURE — 71045 X-RAY EXAM CHEST 1 VIEW: CPT | Mod: 26

## 2020-03-27 PROCEDURE — 93010 ELECTROCARDIOGRAM REPORT: CPT | Mod: 77

## 2020-03-27 RX ORDER — SODIUM BICARBONATE 1 MEQ/ML
325 SYRINGE (ML) INTRAVENOUS
Refills: 0 | Status: DISCONTINUED | OUTPATIENT
Start: 2020-03-27 | End: 2020-04-19

## 2020-03-27 RX ORDER — HEPARIN SODIUM 5000 [USP'U]/ML
5000 INJECTION INTRAVENOUS; SUBCUTANEOUS EVERY 12 HOURS
Refills: 0 | Status: DISCONTINUED | OUTPATIENT
Start: 2020-03-27 | End: 2020-04-04

## 2020-03-27 RX ADMIN — CLOPIDOGREL BISULFATE 75 MILLIGRAM(S): 75 TABLET, FILM COATED ORAL at 11:33

## 2020-03-27 RX ADMIN — Medication 325 MILLIGRAM(S): at 17:47

## 2020-03-27 RX ADMIN — HEPARIN SODIUM 5000 UNIT(S): 5000 INJECTION INTRAVENOUS; SUBCUTANEOUS at 17:48

## 2020-03-27 RX ADMIN — Medication 200 MILLIGRAM(S): at 17:49

## 2020-03-27 RX ADMIN — Medication 25 MILLIGRAM(S): at 17:47

## 2020-03-27 RX ADMIN — Medication 25 MILLIGRAM(S): at 05:09

## 2020-03-27 RX ADMIN — ATORVASTATIN CALCIUM 40 MILLIGRAM(S): 80 TABLET, FILM COATED ORAL at 21:03

## 2020-03-27 NOTE — PROGRESS NOTE ADULT - SUBJECTIVE AND OBJECTIVE BOX
pt seen and examined. feels ok. cough is better. still on O2 supplement.   Vital Signs Last 24 Hrs  T(C): 36.3 (27 Mar 2020 09:24), Max: 37.6 (27 Mar 2020 00:00)  T(F): 97.4 (27 Mar 2020 09:24), Max: 99.6 (27 Mar 2020 00:00)  HR: 86 (27 Mar 2020 09:24) (68 - 86)  BP: 123/80 (27 Mar 2020 09:24) (123/80 - 181/81)  BP(mean): --  RR: 18 (27 Mar 2020 09:24) (17 - 19)  SpO2: 96% (27 Mar 2020 09:24) (93% - 96%) on O2 supplement.     Physical exam:   constitutional NAD, AAOX3, Respiratory  lungs CTA, CVS heart RRR, GI: abdomen Soft NT, ND, BS+, skin: intact  neuro exam non focal.                           12.6   7.48  )-----------( 202      ( 27 Mar 2020 06:39 )             38.1   03-27    140  |  105  |  59<H>  ----------------------------<  107<H>  3.7   |  23  |  3.0<H>    Ca    8.0<L>      27 Mar 2020 06:39  Mg     1.9     03-27    TPro  5.9<L>  /  Alb  3.1<L>  /  TBili  0.7  /  DBili  0.3<H>  /  AST  109<H>  /  ALT  51<H>  /  AlkPhos  101  03-27    COVID-19 PCR: Detected: LDT - Laboratory Developed Test All “detected” results on this new test  are considered presumptively positive results, are clinically actionable,  and specimens will be forwarded to CDC for confirmation testing.  Another report (corrected report) will only be issued if discordant  results occur.  This test has been validated by [facility] to be accurate;  though it has not been FDA cleared/approved by the usual pathway.  As with all laboratory tests, results should be correlated with clinical  findings. (03.25.20 @ 23:27)    baseline cr 2.1    a/p  COVID infection, cont HCQ, cont o2 as needed.   KEEGAN on ckd, cont management per nephro,     PAST MEDICAL & SURGICAL HISTORY: cont current management  CAD (coronary artery disease)  Kidney stones  High cholesterol  HTN (hypertension)  S/P drug eluting coronary stent placement: &gt; 10 years ago    poss dc over the weekend if hypoxemia resolves.     Full code.

## 2020-03-27 NOTE — PROGRESS NOTE ADULT - SUBJECTIVE AND OBJECTIVE BOX
KEZIA MOYER 74y Male  MRN#: [id number]   CODE STATUS: FULL      SUBJECTIVE  Patient is a 74y old Male who presents with a chief complaint of cough x 1 week  Currently admitted to medicine with the primary diagnosis of Acute hypoxic respiratory failure secondary to SARS COv2 pneumonia    Today is hospital day 1d,   INTERVAL HPI/OVERNIGHT EVENTS: afebrile overnight. saturating 94% on 4L    Spoke to patient over phone this morning he is laying in bed comfortably .   Denies chest pain, shortness of breath, abdoimal pain, nausea, vomiting or changes in bowel habits.   has no complaints today.     Urinating and stooling appropriately.    Present Today:           Earl Catheter (x)No/ ()Yes?   Indication:             Central Line (x)No/ ()Yes?   Indication:          IV Fluids (x)No/ ()Yes? Type:  Rate:  Indication:    OBJECTIVE  PAST MEDICAL & SURGICAL HISTORY  CAD (coronary artery disease)  Kidney stones  High cholesterol  HTN (hypertension)  S/P drug eluting coronary stent placement: &gt; 10 years ago    ALLERGIES:  No Known Allergies    HOME MEDICATIONS:  Home Medications:  CLOPIDOGREL  75 MG TABS:  (26 Mar 2020 01:49)  FEBUXOSTAT  40 MG TABS:  (26 Mar 2020 01:49)  FUROSEMIDE  40 MG TABS:  (26 Mar 2020 01:49)  METOPROLOL TARTRATE  25 MG TABS:  (26 Mar 2020 01:49)  PIOGLITAZONE HYDROCHLORIDE  30 MG TABS:  (26 Mar 2020 01:49)  ROSUVASTATIN CALCIUM  20 MG TABS:  (26 Mar 2020 01:48)    MEDICATIONS:  STANDING MEDICATIONS  atorvastatin 40 milliGRAM(s) Oral at bedtime  clopidogrel Tablet 75 milliGRAM(s) Oral daily  hydroxychloroquine   Oral   hydroxychloroquine 200 milliGRAM(s) Oral every 12 hours  metoprolol tartrate 25 milliGRAM(s) Oral two times a day  sodium chloride 0.9%. 1000 milliLiter(s) (75 mL/Hr) IV Continuous <Continuous>    PRN MEDICATIONS      VITAL SIGNS: Last 24 Hours  T(C): 36.3 (27 Mar 2020 09:24), Max: 37.6 (27 Mar 2020 00:00)  T(F): 97.4 (27 Mar 2020 09:24), Max: 99.6 (27 Mar 2020 00:00)  HR: 86 (27 Mar 2020 09:24) (68 - 86)  BP: 123/80 (27 Mar 2020 09:24) (123/80 - 181/81)  BP(mean): --  RR: 18 (27 Mar 2020 09:24) (17 - 19)  SpO2: 96% (27 Mar 2020 09:24) (93% - 96%)    LABS:                        12.6   7.48  )-----------( 202      ( 27 Mar 2020 06:39 )             38.1         140  |  105  |  59<H>  ----------------------------<  107<H>  3.7   |  23  |  3.0<H>    Ca    8.0<L>      27 Mar 2020 06:39  Mg     1.9         TPro  5.9<L>  /  Alb  3.1<L>  /  TBili  0.7  /  DBili  0.3<H>  /  AST  109<H>  /  ALT  51<H>  /  AlkPhos  101        Urinalysis Basic - ( 26 Mar 2020 20:35 )    Color: Light Yellow / Appearance: Clear / S.018 / pH: x  Gluc: x / Ketone: Negative  / Bili: Negative / Urobili: <2 mg/dL   Blood: x / Protein: Negative / Nitrite: Negative   Leuk Esterase: Negative / RBC: x / WBC x   Sq Epi: x / Non Sq Epi: x / Bacteria: x    COVID-19 PCR: Detected: (20 @ 23:27)    Procalcitonin, Serum: 0.28: (20 @ 06:10)      FLU A B RSV Detection by PCR (20 @ 23:16)    Flu A Result: Negative:     Flu B Result: Negative    RSV Result: Negative      RADIOLOGY:  Xray Chest 1 View- PORTABLE-Routine:   EXAM:  XR CHEST PORTABLE ROUTINE 1V            PROCEDURE DATE:  2020            INTERPRETATION:  Clinical History / Reason for exam: Shortness of breath. COVID +    Comparison : Chest radiograph prior day.    Technique/Positioning: Frontal portable, low lung volumes.    Findings:    Support devices: None.    Cardiac/mediastinum/hilum: Unremarkable.    Lung parenchyma/Pleura: Diffuse airspace and interstitial opacifications.    Skeleton/soft tissues: Unchanged.    Impression:      Gradually worsening airspace and interstitial opacifications consistent with viral pneumonia.(20 @ 06:32)  : Xray Chest 1 View AP/PA (20 @ 05:22) >  Diffuse bilateral interstitial and airspace opacities. Consider viral etiology.      ECHO:  none in this admission     12 Lead ECG (20 @ 08:05)   QTC Calculation(Bezet) 462 ms    PHYSICAL EXAM:  as per attendings note    ASSESSMENT & PLAN    73 YO M with a PMH of HTN, HLD, CAD s/p stent, and 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 Associated with SOB and subjective fevers for the past x 1 day. Denies any sore throat, runny nose, CP, palpitations, dysuria, flank pain, LE swelling, or N/V/D. + sick contact (wife with similar symptoms). Retired. No recent travel. In the ED, Chest X-Ray with extensive B/L interstitial opacities concerning for viral pneumonia (no official read). High supplemental O2 requirements. Started on IV ABXs (Ceftriaxone/Azithro). Also, noted to have KEEGAN. COVID pcr is positive. Was started on plaquenil     #Acute hypoxic respiratory failure likely SARS COV2 pneumonia  - saturating  94% on  4L O2, low grade temp : 99.6    - CXR on admission:  diffusebilateral interstitial and airspace opacities  - COVID-19 postive; flu rsv negative  - Procalcitonin: 0.28 CRP: 9  LDH, D-dimer pending     - Liver Biochemical Testing Trend:  - Bilirubin Direct, Serum: 0.3 (20 @ 06:39)  - Aspartate Aminotransferase (AST/SGOT): 109<<120<<119   - Alanine Aminotransferase (ALT/SGPT): 51<<47<<45 ; hold statin   - repeat CXR: worsening opacities   - EKG on admission: sinus tachycardia QTC 444ms  - EKG: 3/27: sinus tachy; QTC 462ms  - On plaquanil   - ID consulted  - Supportive care: tylenol  pain, antussive, antiemetics  - airbone ,contact, droplet precautions  - monitor for hypoxia , upgrade to ICU if deteriorates    #KEEGAN on 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 ~ 2.1 - improving  - likely prerenal due to fever, decreased po intake, lasix/ Doubt ATN.  - Creatinine Trend: 3.0<--, 3.6<--, 4.2<--  - non oliguric  - check UA, urine creatinine, NA, Urea, Pr/Cr ratio  - US renal pending  - Renal is following.   - IVFs (LR).   - Monitor urinary out-put.   -  PTH and Vit D. Hold nephrotoxic agents.    # Mild HAGMA likely from elevated uremic acid levels.  - IVFs (LR). Trend BMPs  - start po bicarb 325 mg q 12 hr as per nephro    #Hx of HTN, HLD, and CAD s/p stent.   - Restart home meds, hold nephrotoxic agents.   - hold statin as lfts>75    ACTIVITY: Increase as tolerated   DVT PPX:   GI PPX: not indicated   DIET: DASH   CODE: full   DIsposition: from home;   Pending: ID

## 2020-03-27 NOTE — PROGRESS NOTE ADULT - ASSESSMENT
75 y/o M with KEEGAN on CKD 3/4 in hospital for hypoxemia, cough, SOB.  PMH CAD s/p PCI, Kidney stones, DLD, HTN, CKD 3/4  COVId +    # KEEGAN on CKD 3/4: baseline cr. ~ 2.1   - likely prerenal due to fever, decreased po intake, lasix.   - serum creatinine improved since admission.   - non-oliguric   - cont po bicarb 325 mg q 12 hr.   - BP at goal, keep current   - Hb at goal.   - strict I/O   - trend creatinine   - unremarkable sono noted.   - avoid nephrotoxins and hypotension   - SOB/cough/ b/l opacities on CXR. work up for COVID-19 . on hydroxychloroquine.   - will follow

## 2020-03-27 NOTE — CONSULT NOTE ADULT - SUBJECTIVE AND OBJECTIVE BOX
KEZIA MOYER  74y, Male  Allergy: No Known Allergies      All historical available data reviewed.    HPI:  75 yo male with PMH HTN, HLD, CAD s/p stent presents c/o cough x 1 week. Pt states he had low grade temp and has had slight decreased appetite, was started on an oral antibiotic by his PMD. Today was having some SOB and Jarrett was called and noted him to be hypoxic. Pt denies any chest pain, palpitations, N/V/D or abdominal pain. Wife also with URI sx. No recent travel.  Pt also found to have elevated Creatinine (26 Mar 2020 01:46)    FAMILY HISTORY:    PAST MEDICAL & SURGICAL HISTORY:  CAD (coronary artery disease)  Kidney stones  High cholesterol  HTN (hypertension)  S/P drug eluting coronary stent placement: &gt; 10 years ago        VITALS:  T(F): 100.1, Max: 100.1 (20 @ 11:56)  HR: 66  BP: 99/66  RR: 18Vital Signs Last 24 Hrs  T(C): 37.8 (27 Mar 2020 11:56), Max: 37.8 (27 Mar 2020 11:56)  T(F): 100.1 (27 Mar 2020 11:56), Max: 100.1 (27 Mar 2020 11:56)  HR: 66 (27 Mar 2020 11:56) (66 - 86)  BP: 99/66 (27 Mar 2020 11:56) (99/66 - 181/81)  BP(mean): --  RR: 18 (27 Mar 2020 11:56) (17 - 19)  SpO2: 95% (27 Mar 2020 11:56) (93% - 96%)    TESTS & MEASUREMENTS:                        12.6   7.48  )-----------( 202      ( 27 Mar 2020 06:39 )             38.1         140  |  105  |  59<H>  ----------------------------<  107<H>  3.7   |  23  |  3.0<H>    Ca    8.0<L>      27 Mar 2020 06:39  Mg     1.9         TPro  5.9<L>  /  Alb  3.1<L>  /  TBili  0.7  /  DBili  0.3<H>  /  AST  109<H>  /  ALT  51<H>  /  AlkPhos  101  03-27    LIVER FUNCTIONS - ( 27 Mar 2020 06:39 )  Alb: 3.1 g/dL / Pro: 5.9 g/dL / ALK PHOS: 101 U/L / ALT: 51 U/L / AST: 109 U/L / GGT: x             Urinalysis Basic - ( 26 Mar 2020 20:35 )    Color: Light Yellow / Appearance: Clear / S.018 / pH: x  Gluc: x / Ketone: Negative  / Bili: Negative / Urobili: <2 mg/dL   Blood: x / Protein: Negative / Nitrite: Negative   Leuk Esterase: Negative / RBC: x / WBC x   Sq Epi: x / Non Sq Epi: x / Bacteria: x          RADIOLOGY & ADDITIONAL TESTS:  Personal review of radiological diagnostics performed  Echo and EKG results noted when applicable.     MEDICATIONS:  atorvastatin 40 milliGRAM(s) Oral at bedtime  clopidogrel Tablet 75 milliGRAM(s) Oral daily  heparin  Injectable 5000 Unit(s) SubCutaneous every 12 hours  hydroxychloroquine   Oral   hydroxychloroquine 200 milliGRAM(s) Oral every 12 hours  metoprolol tartrate 25 milliGRAM(s) Oral two times a day  sodium bicarbonate 325 milliGRAM(s) Oral two times a day  sodium chloride 0.9%. 1000 milliLiter(s) IV Continuous <Continuous>      ANTIBIOTICS:  hydroxychloroquine   Oral   hydroxychloroquine 200 milliGRAM(s) Oral every 12 hours

## 2020-03-27 NOTE — CONSULT NOTE ADULT - ASSESSMENT
74y old Male who presents with a chief complaint of cough x 1 week  Currently admitted to medicine with the primary diagnosis of Acute hypoxic respiratory failure secondary to SARS COv2 pneumonia    IMPRESSION:  # COVID 19 with moderate illness with CXR with diffuse opacities over all lobes  -94% on 4 liters    RECOMMENDATIONS:  - Obtain EKG --> If QTC <500, Start PLAQUENIL 400mg PO q12h x 2 doses, then 200mg BID PO x 4 days (Pt is not to be discharged on plaquenil)  - Monitor QTC with EKG daily  - Supportive care   - Send IL-6 x1, ferritin, fibrinogen, CRP, TAG q48h to monitor for signs of cytokine release syndrome

## 2020-03-28 LAB
D DIMER BLD IA.RAPID-MCNC: 961 NG/ML DDU — HIGH (ref 0–230)
FIBRINOGEN PPP-MCNC: >700 MG/DL — HIGH (ref 204.4–570.6)

## 2020-03-28 PROCEDURE — 93010 ELECTROCARDIOGRAM REPORT: CPT

## 2020-03-28 PROCEDURE — 99233 SBSQ HOSP IP/OBS HIGH 50: CPT

## 2020-03-28 RX ADMIN — Medication 25 MILLIGRAM(S): at 19:05

## 2020-03-28 RX ADMIN — SODIUM CHLORIDE 75 MILLILITER(S): 9 INJECTION INTRAMUSCULAR; INTRAVENOUS; SUBCUTANEOUS at 09:07

## 2020-03-28 RX ADMIN — CLOPIDOGREL BISULFATE 75 MILLIGRAM(S): 75 TABLET, FILM COATED ORAL at 12:27

## 2020-03-28 RX ADMIN — Medication 325 MILLIGRAM(S): at 06:36

## 2020-03-28 RX ADMIN — ATORVASTATIN CALCIUM 40 MILLIGRAM(S): 80 TABLET, FILM COATED ORAL at 22:04

## 2020-03-28 RX ADMIN — HEPARIN SODIUM 5000 UNIT(S): 5000 INJECTION INTRAVENOUS; SUBCUTANEOUS at 06:37

## 2020-03-28 RX ADMIN — Medication 25 MILLIGRAM(S): at 06:36

## 2020-03-28 RX ADMIN — Medication 200 MILLIGRAM(S): at 11:28

## 2020-03-28 RX ADMIN — HEPARIN SODIUM 5000 UNIT(S): 5000 INJECTION INTRAVENOUS; SUBCUTANEOUS at 19:07

## 2020-03-28 RX ADMIN — Medication 200 MILLIGRAM(S): at 19:04

## 2020-03-28 RX ADMIN — Medication 325 MILLIGRAM(S): at 19:06

## 2020-03-28 NOTE — PROGRESS NOTE ADULT - SUBJECTIVE AND OBJECTIVE BOX
Progress Note:  Provider Speciality                            Hospitalist      KEZIA MOYER MRN-[id number] 74y Male     CHIEF PRESENTING COMPLAINT:  Patient is a 74y old  Male who presents with a chief complaint of COVID+ (27 Mar 2020 11:23)        SUBJECTIVE:  Patient was seen and examined at bedside. Reports shortness of breath in AM rounds    No significant overnight events reported.     HISTORY OF PRESENTING ILLNESS:  HPI:  75 yo male with PMH HTN, HLD, CAD s/p stent presents c/o cough x 1 week. Pt states he had low grade temp and has had slight decreased appetite, was started on an oral antibiotic by his PMD. Today was having some SOB and Jarrett was called and noted him to be hypoxic. Pt denies any chest pain, palpitations, N/V/D or abdominal pain. Wife also with URI sx. No recent travel.  Pt also found to have elevated Creatinine (26 Mar 2020 01:46)        REVIEW OF SYSTEMS:  Patient denies any headache, any vision complaints, runny nose, fever, chills, sore throat. Denies chest pain, shortness of breath, palpitation. Denies nausea, vomiting, abdominal pain, diarrhoea, Denies urinary burning, urgency, frequency, dysuria. Denies weakness in any part of the body or numbness.   At least 10 systems were reviewed in ROS. All systems reviewed  are within normal limits except for the complaints as described in Subjective.    PAST MEDICAL & SURGICAL HISTORY:  PAST MEDICAL & SURGICAL HISTORY:  CAD (coronary artery disease)  Kidney stones  High cholesterol  HTN (hypertension)  S/P drug eluting coronary stent placement: &gt; 10 years ago          VITAL SIGNS:  Vital Signs Last 24 Hrs  T(C): 37.6 (28 Mar 2020 10:00), Max: 37.8 (27 Mar 2020 22:13)  T(F): 99.6 (28 Mar 2020 10:00), Max: 100 (27 Mar 2020 22:13)  HR: 66 (28 Mar 2020 10:00) (64 - 73)  BP: 163/73 (28 Mar 2020 10:00) (140/63 - 172/75)  BP(mean): --  RR: 20 (28 Mar 2020 10:00) (19 - 20)  SpO2: 90% (28 Mar 2020 10:00) (90% - 95%)          PHYSICAL EXAMINATION:  In mild respiratory  distress  General: No pallor, no icterus  HEENT:   EOMI, no JVD,.  Heart: S1+S2 audible  Lungs: bilateral  fair air entry, mild bilateral ronchi  Abdomen: Soft, non-tender, non-distended , no  rigidity or guarding.  CNS: AAOx3, CN  grossly intact.  Extremities:  No edema            CONSULTS:  Consultant(s) Notes Reviewed by me.   Care Discussed with Consultants/Other Providers where required.        MEDICATIONS:  MEDICATIONS  (STANDING):  atorvastatin 40 milliGRAM(s) Oral at bedtime  clopidogrel Tablet 75 milliGRAM(s) Oral daily  heparin  Injectable 5000 Unit(s) SubCutaneous every 12 hours  hydroxychloroquine   Oral   hydroxychloroquine 200 milliGRAM(s) Oral every 12 hours  metoprolol tartrate 25 milliGRAM(s) Oral two times a day  sodium bicarbonate 325 milliGRAM(s) Oral two times a day  sodium chloride 0.9%. 1000 milliLiter(s) (75 mL/Hr) IV Continuous <Continuous>    MEDICATIONS  (PRN):            ASSESSMENT:      75 yo male with PMH HTN, HLD, CAD s/p stent presents c/o cough x 1 week. Pt states he had low grade temp and has had slight decreased appetite, was started     on an oral antibiotic by his PMD.     ASSESSMENT:  Principal Diagnosis:  Viral Pneumonia secondary to COVID-19  Acute kidney injury on Chronic renal disease stage 3-possibly pre-renal  Metabolic acidosis    Associated Active Comorbid Conditions:  Hypertension   Coronary artery disease status post PCI  Dyslipidemia       PLAN:   COVID positive  CXR with worsening bilateral opacities   Isolation precautions (contact, airborne, droplet)  Supportive care including  PRN analgesics, anti-tussives, anti-emetics, anti-pyretics  follow up CRP  Daily CBC’s and LFT’s   started Plaquenil if QTC is >500.  today  No antibiotics for now. procalcitonin 0.28  Elevated CRP, ferritin  oxygen (NC vs NRR) to maintain Po2 >93%, currently requiring 5 L NC  continue with  home meds for chronic comorbidities    Progress note Handoff:   Pending: Medical stability  Discussion: Diagnosis, current management plan and further plan of care discussed with patient  and housestaff in morning  rounds.  Disposition: Home when stable

## 2020-03-29 LAB
ALBUMIN SERPL ELPH-MCNC: 2.8 G/DL — LOW (ref 3.5–5.2)
ALP SERPL-CCNC: 91 U/L — SIGNIFICANT CHANGE UP (ref 30–115)
ALT FLD-CCNC: 61 U/L — HIGH (ref 0–41)
ANION GAP SERPL CALC-SCNC: 13 MMOL/L — SIGNIFICANT CHANGE UP (ref 7–14)
AST SERPL-CCNC: 93 U/L — HIGH (ref 0–41)
BASOPHILS # BLD AUTO: 0.01 K/UL — SIGNIFICANT CHANGE UP (ref 0–0.2)
BASOPHILS NFR BLD AUTO: 0.1 % — SIGNIFICANT CHANGE UP (ref 0–1)
BILIRUB SERPL-MCNC: 0.8 MG/DL — SIGNIFICANT CHANGE UP (ref 0.2–1.2)
BUN SERPL-MCNC: 31 MG/DL — HIGH (ref 10–20)
CALCIUM SERPL-MCNC: 7.9 MG/DL — LOW (ref 8.5–10.1)
CHLORIDE SERPL-SCNC: 108 MMOL/L — SIGNIFICANT CHANGE UP (ref 98–110)
CO2 SERPL-SCNC: 22 MMOL/L — SIGNIFICANT CHANGE UP (ref 17–32)
CREAT SERPL-MCNC: 1.9 MG/DL — HIGH (ref 0.7–1.5)
D DIMER BLD IA.RAPID-MCNC: 3242 NG/ML DDU — SIGNIFICANT CHANGE UP (ref 0–230)
EOSINOPHIL # BLD AUTO: 0.04 K/UL — SIGNIFICANT CHANGE UP (ref 0–0.7)
EOSINOPHIL NFR BLD AUTO: 0.6 % — SIGNIFICANT CHANGE UP (ref 0–8)
FIBRINOGEN PPP-MCNC: >700 MG/DL — HIGH (ref 204.4–570.6)
GLUCOSE SERPL-MCNC: 110 MG/DL — HIGH (ref 70–99)
HCT VFR BLD CALC: 37.1 % — LOW (ref 42–52)
HGB BLD-MCNC: 11.8 G/DL — LOW (ref 14–18)
IMM GRANULOCYTES NFR BLD AUTO: 0.6 % — HIGH (ref 0.1–0.3)
LYMPHOCYTES # BLD AUTO: 0.85 K/UL — LOW (ref 1.2–3.4)
LYMPHOCYTES # BLD AUTO: 11.9 % — LOW (ref 20.5–51.1)
MAGNESIUM SERPL-MCNC: 1.7 MG/DL — LOW (ref 1.8–2.4)
MCHC RBC-ENTMCNC: 29.1 PG — SIGNIFICANT CHANGE UP (ref 27–31)
MCHC RBC-ENTMCNC: 31.8 G/DL — LOW (ref 32–37)
MCV RBC AUTO: 91.4 FL — SIGNIFICANT CHANGE UP (ref 80–94)
MONOCYTES # BLD AUTO: 0.77 K/UL — HIGH (ref 0.1–0.6)
MONOCYTES NFR BLD AUTO: 10.8 % — HIGH (ref 1.7–9.3)
NEUTROPHILS # BLD AUTO: 5.42 K/UL — SIGNIFICANT CHANGE UP (ref 1.4–6.5)
NEUTROPHILS NFR BLD AUTO: 76 % — HIGH (ref 42.2–75.2)
NRBC # BLD: 0 /100 WBCS — SIGNIFICANT CHANGE UP (ref 0–0)
PLATELET # BLD AUTO: 236 K/UL — SIGNIFICANT CHANGE UP (ref 130–400)
POTASSIUM SERPL-MCNC: 4.2 MMOL/L — SIGNIFICANT CHANGE UP (ref 3.5–5)
POTASSIUM SERPL-SCNC: 4.2 MMOL/L — SIGNIFICANT CHANGE UP (ref 3.5–5)
PROT SERPL-MCNC: 5.6 G/DL — LOW (ref 6–8)
RBC # BLD: 4.06 M/UL — LOW (ref 4.7–6.1)
RBC # FLD: 13.9 % — SIGNIFICANT CHANGE UP (ref 11.5–14.5)
SODIUM SERPL-SCNC: 143 MMOL/L — SIGNIFICANT CHANGE UP (ref 135–146)
WBC # BLD: 7.13 K/UL — SIGNIFICANT CHANGE UP (ref 4.8–10.8)
WBC # FLD AUTO: 7.13 K/UL — SIGNIFICANT CHANGE UP (ref 4.8–10.8)

## 2020-03-29 PROCEDURE — 99233 SBSQ HOSP IP/OBS HIGH 50: CPT

## 2020-03-29 PROCEDURE — 71045 X-RAY EXAM CHEST 1 VIEW: CPT | Mod: 26

## 2020-03-29 PROCEDURE — 93010 ELECTROCARDIOGRAM REPORT: CPT

## 2020-03-29 RX ORDER — ACETAMINOPHEN 500 MG
650 TABLET ORAL EVERY 6 HOURS
Refills: 0 | Status: DISCONTINUED | OUTPATIENT
Start: 2020-03-29 | End: 2020-04-19

## 2020-03-29 RX ORDER — MAGNESIUM OXIDE 400 MG ORAL TABLET 241.3 MG
400 TABLET ORAL
Refills: 0 | Status: DISCONTINUED | OUTPATIENT
Start: 2020-03-29 | End: 2020-04-08

## 2020-03-29 RX ADMIN — MAGNESIUM OXIDE 400 MG ORAL TABLET 400 MILLIGRAM(S): 241.3 TABLET ORAL at 11:23

## 2020-03-29 RX ADMIN — Medication 650 MILLIGRAM(S): at 22:27

## 2020-03-29 RX ADMIN — MAGNESIUM OXIDE 400 MG ORAL TABLET 400 MILLIGRAM(S): 241.3 TABLET ORAL at 17:25

## 2020-03-29 RX ADMIN — Medication 325 MILLIGRAM(S): at 06:43

## 2020-03-29 RX ADMIN — Medication 25 MILLIGRAM(S): at 06:41

## 2020-03-29 RX ADMIN — Medication 25 MILLIGRAM(S): at 17:25

## 2020-03-29 RX ADMIN — ATORVASTATIN CALCIUM 40 MILLIGRAM(S): 80 TABLET, FILM COATED ORAL at 22:27

## 2020-03-29 RX ADMIN — CLOPIDOGREL BISULFATE 75 MILLIGRAM(S): 75 TABLET, FILM COATED ORAL at 11:23

## 2020-03-29 RX ADMIN — Medication 200 MILLIGRAM(S): at 17:26

## 2020-03-29 RX ADMIN — HEPARIN SODIUM 5000 UNIT(S): 5000 INJECTION INTRAVENOUS; SUBCUTANEOUS at 17:25

## 2020-03-29 RX ADMIN — Medication 200 MILLIGRAM(S): at 06:42

## 2020-03-29 RX ADMIN — HEPARIN SODIUM 5000 UNIT(S): 5000 INJECTION INTRAVENOUS; SUBCUTANEOUS at 06:43

## 2020-03-29 RX ADMIN — Medication 325 MILLIGRAM(S): at 17:24

## 2020-03-29 NOTE — PROGRESS NOTE ADULT - SUBJECTIVE AND OBJECTIVE BOX
Does not need it for this med.  Should not cause yeast infection Nephrology progress note     events over the last 24 h noted .  cr imrpoved     Allergies:  No Known Allergies    Hospital Medications:   MEDICATIONS  (STANDING):  atorvastatin 40 milliGRAM(s) Oral at bedtime  clopidogrel Tablet 75 milliGRAM(s) Oral daily  heparin  Injectable 5000 Unit(s) SubCutaneous every 12 hours  hydroxychloroquine   Oral   hydroxychloroquine 200 milliGRAM(s) Oral every 12 hours  magnesium oxide 400 milliGRAM(s) Oral three times a day with meals  metoprolol tartrate 25 milliGRAM(s) Oral two times a day  sodium bicarbonate 325 milliGRAM(s) Oral two times a day        VITALS:  T(F): 98 (20 @ 10:00), Max: 100.9 (20 @ 21:36)  HR: 70 (20 @ 10:00)  BP: 155/71 (20 @ 10:00)  RR: 20 (20 @ 10:13)  SpO2: 94% (20 @ 10:13)  Wt(kg): --     @ 07:  -   @ 07:00  --------------------------------------------------------  IN: 0 mL / OUT: 1700 mL / NET: -1700 mL     @ 07:  -   @ 07:00  --------------------------------------------------------  IN: 75 mL / OUT: 1 mL / NET: 74 mL     @ 07:  -   @ 14:50  --------------------------------------------------------  IN: 225 mL / OUT: 600 mL / NET: -375 mL          PHYSICAL EXAM:  Constitutional: NAD  HEENT: anicteric sclera, oropharynx clear, MMM  Neck: No JVD  Respiratory: CTAB, no wheezes, rales or rhonchi  Cardiovascular: S1, S2, RRR  Gastrointestinal: BS+, soft, NT/ND  Extremities: No cyanosis or clubbing. No peripheral edema  :  No sosa.   Skin: No rashes    LABS:      143  |  108  |  31<H>  ----------------------------<  110<H>  4.2   |  22  |  1.9<H>    Ca    7.9<L>      29 Mar 2020 07:35  Mg     1.7         TPro  5.6<L>  /  Alb  2.8<L>  /  TBili  0.8  /  DBili      /  AST  93<H>  /  ALT  61<H>  /  AlkPhos  91                            11.8   7.13  )-----------( 236      ( 29 Mar 2020 07:35 )             37.1       Urine Studies:  Urinalysis Basic - ( 26 Mar 2020 20:35 )    Color: Light Yellow / Appearance: Clear / S.018 / pH:   Gluc:  / Ketone: Negative  / Bili: Negative / Urobili: <2 mg/dL   Blood:  / Protein: Negative / Nitrite: Negative   Leuk Esterase: Negative / RBC:  / WBC    Sq Epi:  / Non Sq Epi:  / Bacteria:       Sodium, Random Urine: 44.0 mmoL/L ( @ 20:35)  Protein/Creatinine Ratio Calculation: 0.1 Ratio ( @ 20:35)  Creatinine, Random Urine: 36 mg/dL ( @ 20:35)    RADIOLOGY & ADDITIONAL STUDIES:

## 2020-03-29 NOTE — PROGRESS NOTE ADULT - ASSESSMENT
75 y/o M with KEEGAN on CKD 3/4 in hospital for hypoxemia, cough, SOB.  PMH CAD s/p PCI, Kidney stones, DLD, HTN, CKD 3/4  COVId +    # KEEGAN on CKD 3/4: baseline cr. ~ 2.1   - likely prerenal due to fever, decreased po intake, lasix.   - serum creatinine improved since admission.   - non-oliguric  off IVF   - BP at goal, keep current   - Hb at goal.   - strict I/O   - trend creatinine   - unremarkable sono noted.   - avoid nephrotoxins and hypotension   - SOB/cough/ b/l opacities on CXR. work up for COVID-19 . on hydroxychloroquine.      f/u w/ Dr. Wilhelm post d/c  please recall w/ any questiosn or concerns

## 2020-03-29 NOTE — PROGRESS NOTE ADULT - SUBJECTIVE AND OBJECTIVE BOX
Progress Note:  Provider Speciality                            Hospitalist      KEZIA MOYER [id number] 74y Male     CHIEF PRESENTING COMPLAINT:  Patient is a 74y old  Male who presents with a chief complaint of COVID+ (27 Mar 2020 11:23)        SUBJECTIVE:  Patient was seen and examined at bedside. Reports shortness of breath in AM rounds    No significant overnight events reported.     HISTORY OF PRESENTING ILLNESS:  HPI:  73 yo male with PMH HTN, HLD, CAD s/p stent presents c/o cough x 1 week. Pt states he had low grade temp and has had slight decreased appetite, was started on an oral antibiotic by his PMD. Today was having some SOB and Jarrett was called and noted him to be hypoxic. Pt denies any chest pain, palpitations, N/V/D or abdominal pain. Wife also with URI sx. No recent travel.  Pt also found to have elevated Creatinine (26 Mar 2020 01:46)        REVIEW OF SYSTEMS:  Patient denies any headache, any vision complaints, runny nose, fever, chills, sore throat. Denies chest pain, shortness of breath, palpitation. Denies nausea, vomiting, abdominal pain, diarrhoea, Denies urinary burning, urgency, frequency, dysuria. Denies weakness in any part of the body or numbness.   At least 10 systems were reviewed in ROS. All systems reviewed  are within normal limits except for the complaints as described in Subjective.    PAST MEDICAL & SURGICAL HISTORY:  PAST MEDICAL & SURGICAL HISTORY:  CAD (coronary artery disease)  Kidney stones  High cholesterol  HTN (hypertension)  S/P drug eluting coronary stent placement: &gt; 10 years ago          VITAL SIGNS:  Vital Signs Last 24 Hrs  T(C): 36.7 (29 Mar 2020 10:00), Max: 38.3 (28 Mar 2020 21:36)  T(F): 98 (29 Mar 2020 10:00), Max: 100.9 (28 Mar 2020 21:36)  HR: 70 (29 Mar 2020 10:00) (70 - 78)  BP: 155/71 (29 Mar 2020 10:00) (132/72 - 188/79)  BP(mean): --  RR: 20 (29 Mar 2020 10:13) (20 - 21)  SpO2: 94% (29 Mar 2020 10:13) (90% - 98%)        PHYSICAL EXAMINATION:  In mild respiratory  distress  General: No pallor, no icterus  HEENT:   EOMI, no JVD,.  Heart: S1+S2 audible  Lungs: bilateral  fair air entry, mild bilateral ronchi  Abdomen: Soft, non-tender, non-distended , no  rigidity or guarding.  CNS: AAOx3, CN  grossly intact.  Extremities:  No edema            CONSULTS:  Consultant(s) Notes Reviewed by me.   Care Discussed with Consultants/Other Providers where required.        MEDICATIONS:  MEDICATIONS  (STANDING):  atorvastatin 40 milliGRAM(s) Oral at bedtime  clopidogrel Tablet 75 milliGRAM(s) Oral daily  heparin  Injectable 5000 Unit(s) SubCutaneous every 12 hours  hydroxychloroquine   Oral   hydroxychloroquine 200 milliGRAM(s) Oral every 12 hours  metoprolol tartrate 25 milliGRAM(s) Oral two times a day  sodium bicarbonate 325 milliGRAM(s) Oral two times a day  sodium chloride 0.9%. 1000 milliLiter(s) (75 mL/Hr) IV Continuous <Continuous>    MEDICATIONS  (PRN):            ASSESSMENT:      73 yo male with PMH HTN, HLD, CAD s/p stent presents c/o cough x 1 week. Pt states he had low grade temp and has had slight decreased appetite, was started     on an oral antibiotic by his PMD.     ASSESSMENT:  Principal Diagnosis:  Viral Pneumonia secondary to COVID-19  Acute kidney injury on Chronic renal disease stage 3-possibly pre-renal-improving  Metabolic acidosis    Associated Active Comorbid Conditions:  Hypertension   Coronary artery disease status post PCI  Dyslipidemia       PLAN:   COVID positive  CXR with worsening bilateral opacities   Isolation precautions (contact, airborne, droplet)  Supportive care including  PRN analgesics, anti-tussives, anti-emetics, anti-pyretics  follow up CRP  Daily CBC’s and LFT’s   started Plaquenil if QTC is >500.    No antibiotics for now. procalcitonin 0.28  Elevated CRP, ferritin  oxygen - NC , titrated from 5 to 2 L today  continue with  home meds for chronic comorbidities    Progress note Handoff:   Pending: Medical stability  Discussion: Diagnosis, current management plan and further plan of care discussed with patient  and housestaff in morning  rounds.  Disposition: Home when stable Progress Note:  Provider Speciality                            Hospitalist      KEZIA MOYER [id number] 74y Male     CHIEF PRESENTING COMPLAINT:  Patient is a 74y old  Male who presents with a chief complaint of COVID+ (27 Mar 2020 11:23)        SUBJECTIVE:  Patient was seen and examined at bedside. Reports shortness of breath in AM rounds    No significant overnight events reported.     HISTORY OF PRESENTING ILLNESS:  HPI:  75 yo male with PMH HTN, HLD, CAD s/p stent presents c/o cough x 1 week. Pt states he had low grade temp and has had slight decreased appetite, was started on an oral antibiotic by his PMD. Today was having some SOB and Jarrett was called and noted him to be hypoxic. Pt denies any chest pain, palpitations, N/V/D or abdominal pain. Wife also with URI sx. No recent travel.  Pt also found to have elevated Creatinine (26 Mar 2020 01:46)        REVIEW OF SYSTEMS:  Patient denies any headache, any vision complaints, runny nose, fever, chills, sore throat. Denies chest pain, shortness of breath, palpitation. Denies nausea, vomiting, abdominal pain, diarrhoea, Denies urinary burning, urgency, frequency, dysuria. Denies weakness in any part of the body or numbness.   At least 10 systems were reviewed in ROS. All systems reviewed  are within normal limits except for the complaints as described in Subjective.    PAST MEDICAL & SURGICAL HISTORY:  PAST MEDICAL & SURGICAL HISTORY:  CAD (coronary artery disease)  Kidney stones  High cholesterol  HTN (hypertension)  S/P drug eluting coronary stent placement: &gt; 10 years ago          VITAL SIGNS:  Vital Signs Last 24 Hrs  T(C): 36.7 (29 Mar 2020 10:00), Max: 38.3 (28 Mar 2020 21:36)  T(F): 98 (29 Mar 2020 10:00), Max: 100.9 (28 Mar 2020 21:36)  HR: 70 (29 Mar 2020 10:00) (70 - 78)  BP: 155/71 (29 Mar 2020 10:00) (132/72 - 188/79)  BP(mean): --  RR: 20 (29 Mar 2020 10:13) (20 - 21)  SpO2: 94% (29 Mar 2020 10:13) (90% - 98%)        PHYSICAL EXAMINATION:  In mild respiratory  distress  General: No pallor, no icterus  HEENT:   EOMI, no JVD,.  Heart: S1+S2 audible  Lungs: bilateral  fair air entry, mild bilateral ronchi  Abdomen: Soft, non-tender, non-distended , no  rigidity or guarding.  CNS: AAOx3, CN  grossly intact.  Extremities:  No edema            CONSULTS:  Consultant(s) Notes Reviewed by me.   Care Discussed with Consultants/Other Providers where required.        MEDICATIONS:  MEDICATIONS  (STANDING):  atorvastatin 40 milliGRAM(s) Oral at bedtime  clopidogrel Tablet 75 milliGRAM(s) Oral daily  heparin  Injectable 5000 Unit(s) SubCutaneous every 12 hours  hydroxychloroquine   Oral   hydroxychloroquine 200 milliGRAM(s) Oral every 12 hours  metoprolol tartrate 25 milliGRAM(s) Oral two times a day  sodium bicarbonate 325 milliGRAM(s) Oral two times a day  sodium chloride 0.9%. 1000 milliLiter(s) (75 mL/Hr) IV Continuous <Continuous>    MEDICATIONS  (PRN):            ASSESSMENT:      75 yo male with PMH HTN, HLD, CAD s/p stent presents c/o cough x 1 week. Pt states he had low grade temp and has had slight decreased appetite, was started     on an oral antibiotic by his PMD.     ASSESSMENT:  Principal Diagnosis:  Viral Pneumonia secondary to COVID-19  Acute kidney injury on Chronic renal disease stage 3-possibly pre-renal-improving  Metabolic acidosis    Associated Active Comorbid Conditions:  Hypertension   Coronary artery disease status post PCI  Dyslipidemia       PLAN:   COVID positive  CXR with worsening bilateral opacities   Isolation precautions (contact, airborne, droplet)  Supportive care including  PRN analgesics, anti-tussives, anti-emetics, anti-pyretics  follow up CRP  Daily CBC’s and LFT’s   started Plaquenil if QTC is >500.    No antibiotics for now. procalcitonin 0.28  Elevated CRP, ferritin  oxygen - NC , titrated from 5 to 2 L today  Acute kidney injury - improving . d/c iVF  continue with  home meds for chronic comorbidities    Progress note Handoff:   Pending: Medical stability  Discussion: Diagnosis, current management plan and further plan of care discussed with patient  and housestaff in morning  rounds.  Disposition: Home when stable

## 2020-03-29 NOTE — PROGRESS NOTE ADULT - SUBJECTIVE AND OBJECTIVE BOX
KEZIA MOYER 74y Male  MRN#: [id number]   CODE STATUS: FULL      SUBJECTIVE  Patient is a 74y old Male who presents with a chief complaint of COVID+ (27 Mar 2020 11:23)    Currently admitted to medicine with the primary diagnosis of SARS COV2 pneumonia    Today is hospital day 3d,   INTERVAL HPI/OVERNIGHT EVENTS: low grade temp overnight    Spoke to patient over phone this morning     Present Today:           Earl Catheter (x)No/ ()Yes?   Indication:             Central Line (x)No/ ()Yes?   Indication:          IV Fluids (x)No/ ()Yes? Type:  Rate:  Indication:    OBJECTIVE  PAST MEDICAL & SURGICAL HISTORY  CAD (coronary artery disease)  Kidney stones  High cholesterol  HTN (hypertension)  S/P drug eluting coronary stent placement: &gt; 10 years ago    ALLERGIES:  No Known Allergies    HOME MEDICATIONS:  Home Medications:  CLOPIDOGREL  75 MG TABS:  (26 Mar 2020 01:49)  FEBUXOSTAT  40 MG TABS:  (26 Mar 2020 01:49)  FUROSEMIDE  40 MG TABS:  (26 Mar 2020 01:49)  METOPROLOL TARTRATE  25 MG TABS:  (26 Mar 2020 01:49)  PIOGLITAZONE HYDROCHLORIDE  30 MG TABS:  (26 Mar 2020 01:49)  ROSUVASTATIN CALCIUM  20 MG TABS:  (26 Mar 2020 01:48)    MEDICATIONS:  STANDING MEDICATIONS  atorvastatin 40 milliGRAM(s) Oral at bedtime  clopidogrel Tablet 75 milliGRAM(s) Oral daily  heparin  Injectable 5000 Unit(s) SubCutaneous every 12 hours  hydroxychloroquine   Oral   hydroxychloroquine 200 milliGRAM(s) Oral every 12 hours  metoprolol tartrate 25 milliGRAM(s) Oral two times a day  sodium bicarbonate 325 milliGRAM(s) Oral two times a day  sodium chloride 0.9%. 1000 milliLiter(s) (75 mL/Hr) IV Continuous <Continuous>    PRN MEDICATIONS  acetaminophen   Tablet .. 650 milliGRAM(s) Oral every 6 hours PRN    VITAL SIGNS: Last 24 Hours  T(C): 38.3 (29 Mar 2020 06:00), Max: 38.3 (28 Mar 2020 21:36)  T(F): 100.9 (29 Mar 2020 06:00), Max: 100.9 (28 Mar 2020 21:36)  HR: 76 (29 Mar 2020 06:00) (66 - 78)  BP: 132/72 (29 Mar 2020 06:00) (132/72 - 188/79)  RR: 20 (29 Mar 2020 06:00) (20 - 21)  SpO2: 93% (29 Mar 2020 06:00) (90% - 97%)    LABS:                        11.8   7.13  )-----------( 236      ( 29 Mar 2020 07:35 )             37.1     03-29    143  |  108  |  31<H>  ----------------------------<  110<H>  4.2   |  22  |  1.9<H>    Ca    7.9<L>      29 Mar 2020 07:35  Mg     1.7     03-29    TPro  5.6<L>  /  Alb  2.8<L>  /  TBili  0.8  /  DBili  x   /  AST  93<H>  /  ALT  61<H>  /  AlkPhos  91  03-29    Procalcitonin, Serum: 0.28:  (03.26.20 @ 06:10)    RADIOLOGY:  < from: Xray Chest 1 View- PORTABLE-Routine (03.27.20 @ 06:32) >  Gradually worsening airspace and interstitial opacifications consistent with viral pneumonia.    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 Associated with SOB and subjective fevers for the past x 1 day. Denies any sore throat, runny nose, CP, palpitations, dysuria, flank pain, LE swelling, or N/V/D. + sick contact (wife with similar symptoms). Retired. No recent travel. In the ED, Chest X-Ray with extensive B/L interstitial opacities concerning for viral pneumonia (no official read). High supplemental O2 requirements. Also, noted to have KEEGAN. COVID pcr is positive. Was started on plaquenil     #Acute hypoxic respiratory failure likely SARS COV2 pneumonia  - saturating  94% on  4L O2, low grade temp + tmax : 100.9    - CXR on admission:  diffuse bilateral interstitial and airspace opacities  - COVID-19 postive; flu rsv negative  - Procalcitonin: 0.28 CRP: 9  LDH:  D-dimer :61 fibrinogen>700  - Liver Biochemical Testing Trend:  - Bilirubin Direct, Serum: 0.3 (03-27-20 @ 06:39)  - Aspartate Aminotransferase (AST/SGOT): 109<<120<<119   - Alanine Aminotransferase (ALT/SGPT): 51<<47<<45 ; hold statin   - repeat CXR: worsening opacities   - EKG on admission: sinus tachycardia QTC 444ms  - EKG: 3/27: sinus tachy; QTC 462ms  - On plaquanil   - ID consulted; recs appreciated  - sent IL-6 x1, ferritin, fibrinogen, CRP, TAG q48h to monitor for signs of cytokine release syndrome    - Supportive care: tylenol  pain, anti- tussive, antiemetics  - airbone ,contact, droplet precautions  - monitor for hypoxia , upgrade to ICU if deteriorates    #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 ~ 2.1 - improving  - likely prerenal due to fever, decreased po intake, lasix/ Doubt ATN.  - Creatinine Trend: 1.9<--, 3.0<--, 3.6<--, 4.2<--  - non oliguric  - check UA, urine creatinine, NA, Urea, Pr/Cr ratio  - US renal pending  - Renal is following.   - IVFs (LR).   - Monitor urinary out-put.   -  PTH and Vit D. Hold nephrotoxic agents.    # Mild HAGMA likely from elevated uremic acid levels.  - IVFs (LR). Trend BMPs  - start po bicarb 325 mg q 12 hr as per nephro    #Hx of HTN, HLD, and CAD s/p stent.   - Restart home meds, hold nephrotoxic agents.   - hold statin as lfts>75    ACTIVITY: Increase as tolerated   DVT PPX:   GI PPX: not indicated   DIET: DASH   CODE: full   DIsposition: from home;   Pending: ID

## 2020-03-30 ENCOUNTER — TRANSCRIPTION ENCOUNTER (OUTPATIENT)
Age: 75
End: 2020-03-30

## 2020-03-30 LAB
ALBUMIN SERPL ELPH-MCNC: 2.7 G/DL — LOW (ref 3.5–5.2)
ALP SERPL-CCNC: 92 U/L — SIGNIFICANT CHANGE UP (ref 30–115)
ALT FLD-CCNC: 56 U/L — HIGH (ref 0–41)
ANION GAP SERPL CALC-SCNC: 12 MMOL/L — SIGNIFICANT CHANGE UP (ref 7–14)
AST SERPL-CCNC: 79 U/L — HIGH (ref 0–41)
BASOPHILS # BLD AUTO: 0 K/UL — SIGNIFICANT CHANGE UP (ref 0–0.2)
BASOPHILS NFR BLD AUTO: 0 % — SIGNIFICANT CHANGE UP (ref 0–1)
BILIRUB SERPL-MCNC: 0.9 MG/DL — SIGNIFICANT CHANGE UP (ref 0.2–1.2)
BUN SERPL-MCNC: 29 MG/DL — HIGH (ref 10–20)
CALCIUM SERPL-MCNC: 7.8 MG/DL — LOW (ref 8.5–10.1)
CHLORIDE SERPL-SCNC: 109 MMOL/L — SIGNIFICANT CHANGE UP (ref 98–110)
CO2 SERPL-SCNC: 22 MMOL/L — SIGNIFICANT CHANGE UP (ref 17–32)
CREAT SERPL-MCNC: 1.5 MG/DL — SIGNIFICANT CHANGE UP (ref 0.7–1.5)
CRP SERPL-MCNC: 9.69 MG/DL — HIGH (ref 0–0.4)
EOSINOPHIL # BLD AUTO: 0.06 K/UL — SIGNIFICANT CHANGE UP (ref 0–0.7)
EOSINOPHIL NFR BLD AUTO: 0.8 % — SIGNIFICANT CHANGE UP (ref 0–8)
GLUCOSE SERPL-MCNC: 94 MG/DL — SIGNIFICANT CHANGE UP (ref 70–99)
HCT VFR BLD CALC: 35.4 % — LOW (ref 42–52)
HGB BLD-MCNC: 11.3 G/DL — LOW (ref 14–18)
IMM GRANULOCYTES NFR BLD AUTO: 0.4 % — HIGH (ref 0.1–0.3)
LYMPHOCYTES # BLD AUTO: 0.88 K/UL — LOW (ref 1.2–3.4)
LYMPHOCYTES # BLD AUTO: 11.8 % — LOW (ref 20.5–51.1)
MAGNESIUM SERPL-MCNC: 1.8 MG/DL — SIGNIFICANT CHANGE UP (ref 1.8–2.4)
MCHC RBC-ENTMCNC: 29.2 PG — SIGNIFICANT CHANGE UP (ref 27–31)
MCHC RBC-ENTMCNC: 31.9 G/DL — LOW (ref 32–37)
MCV RBC AUTO: 91.5 FL — SIGNIFICANT CHANGE UP (ref 80–94)
MONOCYTES # BLD AUTO: 0.79 K/UL — HIGH (ref 0.1–0.6)
MONOCYTES NFR BLD AUTO: 10.6 % — HIGH (ref 1.7–9.3)
NEUTROPHILS # BLD AUTO: 5.71 K/UL — SIGNIFICANT CHANGE UP (ref 1.4–6.5)
NEUTROPHILS NFR BLD AUTO: 76.4 % — HIGH (ref 42.2–75.2)
NRBC # BLD: 0 /100 WBCS — SIGNIFICANT CHANGE UP (ref 0–0)
PLATELET # BLD AUTO: 225 K/UL — SIGNIFICANT CHANGE UP (ref 130–400)
POTASSIUM SERPL-MCNC: 4 MMOL/L — SIGNIFICANT CHANGE UP (ref 3.5–5)
POTASSIUM SERPL-SCNC: 4 MMOL/L — SIGNIFICANT CHANGE UP (ref 3.5–5)
PROT SERPL-MCNC: 5.4 G/DL — LOW (ref 6–8)
RBC # BLD: 3.87 M/UL — LOW (ref 4.7–6.1)
RBC # FLD: 13.9 % — SIGNIFICANT CHANGE UP (ref 11.5–14.5)
SODIUM SERPL-SCNC: 143 MMOL/L — SIGNIFICANT CHANGE UP (ref 135–146)
WBC # BLD: 7.47 K/UL — SIGNIFICANT CHANGE UP (ref 4.8–10.8)
WBC # FLD AUTO: 7.47 K/UL — SIGNIFICANT CHANGE UP (ref 4.8–10.8)

## 2020-03-30 PROCEDURE — 99233 SBSQ HOSP IP/OBS HIGH 50: CPT

## 2020-03-30 PROCEDURE — 71045 X-RAY EXAM CHEST 1 VIEW: CPT | Mod: 26

## 2020-03-30 RX ORDER — SODIUM BICARBONATE 1 MEQ/ML
1 SYRINGE (ML) INTRAVENOUS
Qty: 0 | Refills: 0 | DISCHARGE
Start: 2020-03-30

## 2020-03-30 RX ORDER — MAGNESIUM OXIDE 400 MG ORAL TABLET 241.3 MG
1 TABLET ORAL
Qty: 90 | Refills: 0
Start: 2020-03-30 | End: 2020-04-28

## 2020-03-30 RX ORDER — METOPROLOL TARTRATE 50 MG
1 TABLET ORAL
Qty: 0 | Refills: 0 | DISCHARGE
Start: 2020-03-30

## 2020-03-30 RX ORDER — ACETAMINOPHEN 500 MG
2 TABLET ORAL
Qty: 0 | Refills: 0 | DISCHARGE
Start: 2020-03-30

## 2020-03-30 RX ORDER — CLOPIDOGREL BISULFATE 75 MG/1
1 TABLET, FILM COATED ORAL
Qty: 0 | Refills: 0 | DISCHARGE
Start: 2020-03-30

## 2020-03-30 RX ORDER — METOPROLOL TARTRATE 50 MG
0 TABLET ORAL
Qty: 90 | Refills: 0 | DISCHARGE

## 2020-03-30 RX ORDER — ACETAMINOPHEN 500 MG
2 TABLET ORAL
Qty: 40 | Refills: 0
Start: 2020-03-30 | End: 2020-04-03

## 2020-03-30 RX ORDER — CLOPIDOGREL BISULFATE 75 MG/1
0 TABLET, FILM COATED ORAL
Qty: 90 | Refills: 0 | DISCHARGE

## 2020-03-30 RX ORDER — SODIUM BICARBONATE 1 MEQ/ML
1 SYRINGE (ML) INTRAVENOUS
Qty: 60 | Refills: 0
Start: 2020-03-30 | End: 2020-04-28

## 2020-03-30 RX ORDER — MAGNESIUM OXIDE 400 MG ORAL TABLET 241.3 MG
1 TABLET ORAL
Qty: 0 | Refills: 0 | DISCHARGE
Start: 2020-03-30

## 2020-03-30 RX ADMIN — CLOPIDOGREL BISULFATE 75 MILLIGRAM(S): 75 TABLET, FILM COATED ORAL at 12:10

## 2020-03-30 RX ADMIN — Medication 25 MILLIGRAM(S): at 18:43

## 2020-03-30 RX ADMIN — MAGNESIUM OXIDE 400 MG ORAL TABLET 400 MILLIGRAM(S): 241.3 TABLET ORAL at 12:10

## 2020-03-30 RX ADMIN — ATORVASTATIN CALCIUM 40 MILLIGRAM(S): 80 TABLET, FILM COATED ORAL at 23:03

## 2020-03-30 RX ADMIN — HEPARIN SODIUM 5000 UNIT(S): 5000 INJECTION INTRAVENOUS; SUBCUTANEOUS at 05:09

## 2020-03-30 RX ADMIN — Medication 25 MILLIGRAM(S): at 05:09

## 2020-03-30 RX ADMIN — Medication 200 MILLIGRAM(S): at 18:43

## 2020-03-30 RX ADMIN — Medication 325 MILLIGRAM(S): at 05:09

## 2020-03-30 RX ADMIN — MAGNESIUM OXIDE 400 MG ORAL TABLET 400 MILLIGRAM(S): 241.3 TABLET ORAL at 10:18

## 2020-03-30 RX ADMIN — Medication 650 MILLIGRAM(S): at 18:00

## 2020-03-30 RX ADMIN — Medication 325 MILLIGRAM(S): at 18:43

## 2020-03-30 RX ADMIN — HEPARIN SODIUM 5000 UNIT(S): 5000 INJECTION INTRAVENOUS; SUBCUTANEOUS at 18:35

## 2020-03-30 RX ADMIN — Medication 200 MILLIGRAM(S): at 05:10

## 2020-03-30 RX ADMIN — MAGNESIUM OXIDE 400 MG ORAL TABLET 400 MILLIGRAM(S): 241.3 TABLET ORAL at 18:43

## 2020-03-30 NOTE — DISCHARGE NOTE PROVIDER - CARE PROVIDERS DIRECT ADDRESSES
[email or web address]

## 2020-03-30 NOTE — DISCHARGE NOTE PROVIDER - HOSPITAL COURSE
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 Associated with SOB and subjective fevers for the past x 1 day. Denies any sore throat, runny nose, CP, palpitations, dysuria, flank pain, LE swelling, or N/V/D. + sick contact (wife with similar symptoms). Retired. No recent travel. In the ED, Chest X-Ray with extensive B/L interstitial opacities concerning for viral pneumonia (no official read). High supplemental O2 requirements. Also, noted to have KEEGAN . COVID pcr is positive.  flu rsv negative. Procalcitonin: 0.28 CRP: 9  LDH:  D-dimer :61 fibrinogen>700. Was started on plaquenil , completed the course.                 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 ~ 2.1 - improving    - likely prerenal due to fever, decreased po intake, lasix/ Doubt ATN.    - Creatinine Trend: 1.9<--, 3.0<--, 3.6<--, 4.2<--    - non oliguric    - check UA, urine creatinine, NA, Urea, Pr/Cr ratio    - US renal pending    - Renal is following.     - IVFs (LR).     - Monitor urinary out-put.     -  PTH and Vit D. Hold nephrotoxic agents.        # Mild HAGMA likely from elevated uremic acid levels.- resolved    - IVFs (LR). 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 Associated with SOB and subjective fevers for the past x 1 day. Denies any sore throat, runny nose, CP, palpitations, dysuria, flank pain, LE swelling, or N/V/D. + sick contact (wife with similar symptoms). Retired. No recent travel. In the ED, Chest X-Ray with extensive B/L interstitial opacities concerning for viral pneumonia (no official read). High supplemental O2 requirements. Also, noted to have KEEGAN . COVID pcr is positive.  flu rsv negative. Procalcitonin: 0.28 CRP: 9  LDH:  D-dimer :61 fibrinogen>700. Was started on plaquenil , completed the course. His course was complicated by KEEAGN on CKD 3/4 likely prerenal due to fever, decreased po intake, lasix/ Doubt ATN. He also had mild HAGMA likely from elevated uremic acid level which resolved. He was started on po bicarb 325 mg q 12 hr as per nephro. He is currently saturating well on room air. His medically stable for discharge. He should self quarantine himself for 14 days        Medical Reconciliation has been reviewed with Medical Attending. All consults cleared for discharge. Discharge instructions discussed and patient aware when to seek medical attention. Patient has proper follow up. All resulted including diagnosis and patient aware they require further follow up. Stressed importance of proper follow up. Medications sent to the pharmacy , checked insurance coverage and changes discussed. All questions and concerns from patient and family addressed. Understanding of instructions verbalized. 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 Associated with SOB and subjective fevers for the past x 1 day. Denies any sore throat, runny nose, CP, palpitations, dysuria, flank pain, LE swelling, or N/V/D. + sick contact (wife with similar symptoms). Retired. No recent travel. In the ED, Chest X-Ray with extensive B/L interstitial opacities concerning for viral pneumonia (no official read). High supplemental O2 requirements. Also, noted to have KEEGAN . COVID pcr is positive.  flu rsv negative. Was started on plaquenil , completed the course. His course was complicated by KEEGAN on CKD 3/4 likely prerenal due to fever, decreased po intake, lasix/ Doubt ATN. He was transferred to [facility] on 4/18. He was weaned of O2. He is currently saturating well on room air. His medically stable for discharge.         Medical Reconciliation has been reviewed with Medical Attending. All consults cleared for discharge. Discharge instructions discussed and patient aware when to seek medical attention. Patient has proper follow up. All resulted including diagnosis and patient aware they require further follow up. Stressed importance of proper follow up. Medications sent to the pharmacy , checked insurance coverage and changes discussed. All questions and concerns from patient and family addressed. Understanding of instructions verbalized.

## 2020-03-30 NOTE — PROGRESS NOTE ADULT - SUBJECTIVE AND OBJECTIVE BOX
Progress Note:  Provider Speciality                            Hospitalist      KEZIA MOYER [id number] 74y Male     CHIEF PRESENTING COMPLAINT:  Patient is a 74y old  Male who presents with a chief complaint of COVID+ (27 Mar 2020 11:23)        SUBJECTIVE:  Patient was seen and examined at bedside. Reports improvement in shortness of breath in AM rounds    No significant overnight events reported.     HISTORY OF PRESENTING ILLNESS:  HPI:  75 yo male with PMH HTN, HLD, CAD s/p stent presents c/o cough x 1 week. Pt states he had low grade temp and has had slight decreased appetite, was started on an oral antibiotic by his PMD. Today was having some SOB and Jarrett was called and noted him to be hypoxic. Pt denies any chest pain, palpitations, N/V/D or abdominal pain. Wife also with URI sx. No recent travel.  Pt also found to have elevated Creatinine (26 Mar 2020 01:46)        REVIEW OF SYSTEMS:  Patient denies any headache, any vision complaints, runny nose, fever, chills, sore throat. Denies chest pain, palpitation. Denies nausea, vomiting, abdominal pain, diarrhoea, Denies urinary burning, urgency, frequency, dysuria. Denies weakness in any part of the body or numbness.   At least 10 systems were reviewed in ROS. All systems reviewed  are within normal limits except for the complaints as described in Subjective.    PAST MEDICAL & SURGICAL HISTORY:  PAST MEDICAL & SURGICAL HISTORY:  CAD (coronary artery disease)  Kidney stones  High cholesterol  HTN (hypertension)  S/P drug eluting coronary stent placement: &gt; 10 years ago          VITAL SIGNS:  Vital Signs Last 24 Hrs  T(C): 36.4 (30 Mar 2020 09:48), Max: 38.3 (29 Mar 2020 17:30)  T(F): 97.6 (30 Mar 2020 09:48), Max: 101 (29 Mar 2020 22:14)  HR: 65 (30 Mar 2020 09:48) (65 - 82)  BP: 163/73 (30 Mar 2020 09:48) (134/59 - 167/74)  BP(mean): --  RR: 18 (30 Mar 2020 09:48) (18 - 20)  SpO2: 92% (30 Mar 2020 06:00) (90% - 93%)      PHYSICAL EXAMINATION:  In mild respiratory  distress  General: No pallor, no icterus  HEENT:   EOMI, no JVD,.  Heart: S1+S2 audible  Lungs: bilateral  fair air entry, mild bilateral ronchi  Abdomen: Soft, non-tender, non-distended , no  rigidity or guarding.  CNS: AAOx3, CN  grossly intact.  Extremities:  No edema            CONSULTS:  Consultant(s) Notes Reviewed by me.   Care Discussed with Consultants/Other Providers where required.        MEDICATIONS:  MEDICATIONS  (STANDING):  atorvastatin 40 milliGRAM(s) Oral at bedtime  clopidogrel Tablet 75 milliGRAM(s) Oral daily  heparin  Injectable 5000 Unit(s) SubCutaneous every 12 hours  hydroxychloroquine   Oral   hydroxychloroquine 200 milliGRAM(s) Oral every 12 hours  metoprolol tartrate 25 milliGRAM(s) Oral two times a day  sodium bicarbonate 325 milliGRAM(s) Oral two times a day  sodium chloride 0.9%. 1000 milliLiter(s) (75 mL/Hr) IV Continuous <Continuous>    MEDICATIONS  (PRN):            ASSESSMENT:      75 yo male with PMH HTN, HLD, CAD s/p stent presents c/o cough x 1 week. Pt states he had low grade temp and has had slight decreased appetite, was started     on an oral antibiotic by his PMD.     ASSESSMENT:  Principal Diagnosis:  Viral Pneumonia secondary to COVID-19  Acute kidney injury on Chronic renal disease stage 3-possibly pre-renal-improving  Metabolic acidosis    Associated Active Comorbid Conditions:  Hypertension   Coronary artery disease status post PCI  Dyslipidemia       PLAN:   COVID positive  CXR with worsening bilateral opacities   Isolation precautions (contact, airborne, droplet)  Supportive care including  PRN analgesics, anti-tussives, anti-emetics, anti-pyretics  follow up CRP  Daily CBC’s and LFT’s   started Plaquenil if QTC is >500.   No antibiotics for now. procalcitonin 0.28  Elevated CRP, ferritin  oxygen - switched from @l to RA, if O saturation > 90% on ambulation , discharge to home today w  Acute kidney injury - improving . d/c iVF  continue with  home meds for chronic comorbidities    Progress note Handoff:   Pending: Medical stability  Discussion: Diagnosis, current management plan and further plan of care discussed with patient  and housestaff in morning  rounds.  Disposition: Home possibly today id does well on RA Progress Note:  Provider Speciality                            Hospitalist      KEZIA MOYER [id number] 74y Male     CHIEF PRESENTING COMPLAINT:  Patient is a 74y old  Male who presents with a chief complaint of COVID+ (27 Mar 2020 11:23)        SUBJECTIVE:  Patient was seen and examined at bedside. Reports improvement in shortness of breath in AM rounds    No significant overnight events reported.     HISTORY OF PRESENTING ILLNESS:  HPI:  75 yo male with PMH HTN, HLD, CAD s/p stent presents c/o cough x 1 week. Pt states he had low grade temp and has had slight decreased appetite, was started on an oral antibiotic by his PMD. Today was having some SOB and Jarrett was called and noted him to be hypoxic. Pt denies any chest pain, palpitations, N/V/D or abdominal pain. Wife also with URI sx. No recent travel.  Pt also found to have elevated Creatinine (26 Mar 2020 01:46)        REVIEW OF SYSTEMS:  Patient denies any headache, any vision complaints, runny nose, fever, chills, sore throat. Denies chest pain, palpitation. Denies nausea, vomiting, abdominal pain, diarrhoea, Denies urinary burning, urgency, frequency, dysuria. Denies weakness in any part of the body or numbness.   At least 10 systems were reviewed in ROS. All systems reviewed  are within normal limits except for the complaints as described in Subjective.    PAST MEDICAL & SURGICAL HISTORY:  PAST MEDICAL & SURGICAL HISTORY:  CAD (coronary artery disease)  Kidney stones  High cholesterol  HTN (hypertension)  S/P drug eluting coronary stent placement: &gt; 10 years ago          VITAL SIGNS:  Vital Signs Last 24 Hrs  T(C): 36.4 (30 Mar 2020 09:48), Max: 38.3 (29 Mar 2020 17:30)  T(F): 97.6 (30 Mar 2020 09:48), Max: 101 (29 Mar 2020 22:14)  HR: 65 (30 Mar 2020 09:48) (65 - 82)  BP: 163/73 (30 Mar 2020 09:48) (134/59 - 167/74)  BP(mean): --  RR: 18 (30 Mar 2020 09:48) (18 - 20)  SpO2: 92% (30 Mar 2020 06:00) (90% - 93%)      PHYSICAL EXAMINATION:  In mild respiratory  distress  General: No pallor, no icterus  HEENT:   EOMI, no JVD,.  Heart: S1+S2 audible  Lungs: bilateral  fair air entry, mild bilateral ronchi  Abdomen: Soft, non-tender, non-distended , no  rigidity or guarding.  CNS: AAOx3, CN  grossly intact.  Extremities:  No edema            CONSULTS:  Consultant(s) Notes Reviewed by me.   Care Discussed with Consultants/Other Providers where required.        MEDICATIONS:  MEDICATIONS  (STANDING):  atorvastatin 40 milliGRAM(s) Oral at bedtime  clopidogrel Tablet 75 milliGRAM(s) Oral daily  heparin  Injectable 5000 Unit(s) SubCutaneous every 12 hours  hydroxychloroquine   Oral   hydroxychloroquine 200 milliGRAM(s) Oral every 12 hours  metoprolol tartrate 25 milliGRAM(s) Oral two times a day  sodium bicarbonate 325 milliGRAM(s) Oral two times a day  sodium chloride 0.9%. 1000 milliLiter(s) (75 mL/Hr) IV Continuous <Continuous>    MEDICATIONS  (PRN):            ASSESSMENT:      75 yo male with PMH HTN, HLD, CAD s/p stent presents c/o cough x 1 week. Pt states he had low grade temp and has had slight decreased appetite, was started     on an oral antibiotic by his PMD.     ASSESSMENT:  Principal Diagnosis:  Viral Pneumonia secondary to COVID-19  Acute kidney injury on Chronic renal disease stage 3-possibly pre-renal-improving  Metabolic acidosis    Associated Active Comorbid Conditions:  Hypertension   Coronary artery disease status post PCI  Dyslipidemia       PLAN:   COVID positive  CXR with worsening bilateral opacities   Isolation precautions (contact, airborne, droplet)  Supportive care including  PRN analgesics, anti-tussives, anti-emetics, anti-pyretics  follow up CRP  Daily CBC’s and LFT’s   started Plaquenil .   No antibiotics for now. procalcitonin 0.28  Elevated CRP, ferritin  oxygen - switched from NC to RA, if O saturation > 90% on ambulation , discharge to home today w  Acute kidney injury - improving . d/c iVF  continue with  home meds for chronic comorbidities    Progress note Handoff:   Pending: Medical stability  Discussion: Diagnosis, current management plan and further plan of care discussed with patient  and housestaff in morning  rounds.  Disposition: Home possibly today if does well on RA

## 2020-03-30 NOTE — DISCHARGE NOTE PROVIDER - PROVIDER TOKENS
PROVIDER:[TOKEN:[[id number]],FOLLOWUP:[2 weeks],ESTABLISHEDPATIENT:[T]] PROVIDER:[TOKEN:[[id number]],FOLLOWUP:[2 weeks],ESTABLISHEDPATIENT:[T]],PROVIDER:[TOKEN:[[id number]]]

## 2020-03-30 NOTE — DISCHARGE NOTE PROVIDER - CARE PROVIDER_API CALL
Kleiner, Morton J (MD)  Internal Medicine; Nephrology  [address] [place] [address]  Phone: [phone]  Fax: [phone]  Established Patient  Follow Up Time: 2 weeks Kleiner, Morton J (MD)  Internal Medicine; Nephrology  [address] [place] [address]  Phone: [phone]  Fax: [phone]  Established Patient  Follow Up Time: 2 weeks    Nabeel Francis)  Cardiovascular Disease; Interventional Cardiology  [address] [place] [address]  Phone: [phone]  Fax: [phone]  Follow Up Time:

## 2020-03-30 NOTE — DISCHARGE NOTE PROVIDER - NSDCCPCAREPLAN_GEN_ALL_CORE_FT
PRINCIPAL DISCHARGE DIAGNOSIS  Diagnosis: Pneumonia due to SARS-associated coronavirus  Assessment and Plan of Treatment:       SECONDARY DISCHARGE DIAGNOSES  Diagnosis: Acute on chronic renal insufficiency  Assessment and Plan of Treatment: Your hospital course was complicated by KEEGAN. Y\ PRINCIPAL DISCHARGE DIAGNOSIS  Diagnosis: Pneumonia due to SARS-associated coronavirus  Assessment and Plan of Treatment: You were tested positive for COVID 19 . You were seen by ID. Completed course og hydroxychloroquine. You were initially on oxygen and currently saturating well on room air. You need to self isolate yourself for 14 days. Wash your hands frquently with soap.    Coughing up mucus is normal. Don’t use medicines to suppress your cough unless your cough is dry, painful, or interferes with your sleep. Get plenty of rest until your fever, shortness of breath, and chest pain go away. Plan to get a flu shot every year. Ask your primary care doctor about pneumonia vaccines.  Seek immediate medical attention if you experience chest pain, trouble breathing, blue lips or fingernails, fever of 100.4°F  (38°C) or higher, yellow, green, bloody, or smelly sputum, cough or trouble for breathing  more than normal mucus production, vomiting or diarrhea.        SECONDARY DISCHARGE DIAGNOSES  Diagnosis: Acute on chronic renal insufficiency  Assessment and Plan of Treatment: Your hospital course was complicated by KEEGAN. You were on fluids. You were started on PO sodium bicarbonate 325mg BID . Please continue the same medications. Follow up with your PMD.

## 2020-03-30 NOTE — DISCHARGE NOTE PROVIDER - NSDCMRMEDTOKEN_GEN_ALL_CORE_FT
acetaminophen 325 mg oral tablet: 2 tab(s) orally every 6 hours, As needed, Temp greater or equal to 38C (100.4F)  clopidogrel 75 mg oral tablet: 1 tab(s) orally once a day  FEBUXOSTAT  40 MG TABS:   FUROSEMIDE  40 MG TABS:   magnesium oxide 400 mg (241.3 mg elemental magnesium) oral tablet: 1 tab(s) orally 3 times a day (with meals)  metoprolol tartrate 25 mg oral tablet: 1 tab(s) orally 2 times a day  PIOGLITAZONE HYDROCHLORIDE  30 MG TABS:   ROSUVASTATIN CALCIUM  20 MG TABS:   sodium bicarbonate 325 mg oral tablet: 1 tab(s) orally 2 times a day acetaminophen 325 mg oral tablet: 2 tab(s) orally every 6 hours, As needed, Temp greater or equal to 38C (100.4F)  apixaban 5 mg oral tablet: 1 tab(s) orally every 12 hours  aspirin 81 mg oral tablet, chewable: 1 tab(s) orally once a day  clopidogrel 75 mg oral tablet: 1 tab(s) orally once a day  FEBUXOSTAT  40 MG TABS: 40 cap(s) orally once a day   FUROSEMIDE  40 MG TABS: 40 cap(s) orally once a day   magnesium oxide 400 mg (241.3 mg elemental magnesium) oral tablet: 1 tab(s) orally 3 times a day (with meals)  metoprolol tartrate 25 mg oral tablet: 1 tab(s) orally 2 times a day  nystatin 100,000 units/g topical powder: 1 application topically 2 times a day  PIOGLITAZONE HYDROCHLORIDE  30 MG TABS: 30 cap(s) orally 2 times a day   ROSUVASTATIN CALCIUM  20 MG TABS: 20 cap(s) orally once a day   sodium bicarbonate 325 mg oral tablet: 1 tab(s) orally 2 times a day acetaminophen 325 mg oral tablet: 2 tab(s) orally every 6 hours, As needed, Temp greater or equal to 38C (100.4F)  apixaban 5 mg oral tablet: 1 tab(s) orally every 12 hours  aspirin 81 mg oral tablet, chewable: 1 tab(s) orally once a day  clopidogrel 75 mg oral tablet: 1 tab(s) orally once a day  FEBUXOSTAT  40 MG TABS: 40 cap(s) orally once a day   FUROSEMIDE  40 MG TABS: 40 cap(s) orally once a day   magnesium oxide 400 mg (241.3 mg elemental magnesium) oral tablet: 1 tab(s) orally 3 times a day (with meals)  metoprolol tartrate 25 mg oral tablet: 1 tab(s) orally 2 times a day  nystatin 100,000 units/g topical powder: 1 application topically 2 times a day  PIOGLITAZONE HYDROCHLORIDE  30 MG TABS: 30 cap(s) orally 2 times a day   ROSUVASTATIN CALCIUM  20 MG TABS: 20 cap(s) orally once a day   sodium bicarbonate 325 mg oral tablet: 325 tab(s) orally 2 times a day

## 2020-03-30 NOTE — PROGRESS NOTE ADULT - SUBJECTIVE AND OBJECTIVE BOX
KEZIA MOYER 74y Male  MRN#: [id number]   CODE STATUS: FULL      SUBJECTIVE  Patient is a 74y old Male who presents with a chief complaint of COVID (29 Mar 2020 09:09)    Currently admitted to medicine with the primary diagnosis of SARS COV2 pneumonia    Today is hospital day 4d,   INTERVAL HPI/OVERNIGHT EVENTS: none. Currently patient is comfortable.     Spoke to patient over phone  this morning , he is comfortable.     Present Today:           Earl Catheter (x)No/ ()Yes?   Indication:             Central Line (x)No/ ()Yes?   Indication:          IV Fluids (x)No/ ()Yes? Type:  Rate:  Indication:    OBJECTIVE  PAST MEDICAL & SURGICAL HISTORY  CAD (coronary artery disease)  Kidney stones  High cholesterol  HTN (hypertension)  S/P drug eluting coronary stent placement: &gt; 10 years ago    ALLERGIES:  No Known Allergies    HOME MEDICATIONS:  Home Medications:  CLOPIDOGREL  75 MG TABS:  (26 Mar 2020 01:49)  FEBUXOSTAT  40 MG TABS:  (26 Mar 2020 01:49)  FUROSEMIDE  40 MG TABS:  (26 Mar 2020 01:49)  METOPROLOL TARTRATE  25 MG TABS:  (26 Mar 2020 01:49)  PIOGLITAZONE HYDROCHLORIDE  30 MG TABS:  (26 Mar 2020 01:49)  ROSUVASTATIN CALCIUM  20 MG TABS:  (26 Mar 2020 01:48)    MEDICATIONS:  STANDING MEDICATIONS  atorvastatin 40 milliGRAM(s) Oral at bedtime  clopidogrel Tablet 75 milliGRAM(s) Oral daily  heparin  Injectable 5000 Unit(s) SubCutaneous every 12 hours  hydroxychloroquine   Oral   hydroxychloroquine 200 milliGRAM(s) Oral every 12 hours  magnesium oxide 400 milliGRAM(s) Oral three times a day with meals  metoprolol tartrate 25 milliGRAM(s) Oral two times a day  sodium bicarbonate 325 milliGRAM(s) Oral two times a day    PRN MEDICATIONS  acetaminophen   Tablet .. 650 milliGRAM(s) Oral every 6 hours PRN      VITAL SIGNS: Last 24 Hours  T(C): 36.9 (30 Mar 2020 06:00), Max: 38.3 (29 Mar 2020 17:30)  T(F): 98.5 (30 Mar 2020 06:00), Max: 101 (29 Mar 2020 22:14)  HR: 72 (30 Mar 2020 06:00) (65 - 82)  BP: 153/72 (30 Mar 2020 06:00) (134/59 - 167/74)  RR: 20 (30 Mar 2020 06:00) (20 - 20)  SpO2: 92% (30 Mar 2020 06:00) (90% - 98%)    LABS:                        11.3   7.47  )-----------( 225      ( 30 Mar 2020 06:17 )             35.4     03-30    143  |  109  |  29<H>  ----------------------------<  94  4.0   |  22  |  1.5    Ca    7.8<L>      30 Mar 2020 06:17  Mg     1.8     03-30    TPro  5.4<L>  /  Alb  2.7<L>  /  TBili  0.9  /  DBili  x   /  AST  79<H>  /  ALT  56<H>  /  AlkPhos  92  03-30    RADIOLOGY:  Xray Chest 1 View- PORTABLE-Routine:   EXAM:  XR CHEST PORTABLE ROUTINE 1V            PROCEDURE DATE:  03/29/2020    INTERPRETATION:  Clinical History / Reason for exam: Shortness of breath    Comparison : Chest radiograph March 27, 2020.    Technique/Positioning: Single AP viewof the chest.    Findings:    Support devices: None.    Cardiac/mediastinum/hilum: Unchanged.    Lung parenchyma/Pleura: Unchanged diffuse airspace and interstitial opacifications. No pneumothorax.    Skeleton/soft tissues: Unchanged.    Impression:    Stable bilateral airspace and interstitial opacifications consistent with viral pneumonia.    ELLIOT LANDAU M.D., ATTENDING RADIOLOGIST  This document has been electronically signed. Mar 29 2020 11:58AM(03-29-20 @ 10:39)    ECHO:      PHYSICAL EXAM:    As per attending's note    ASSESSMENT & PLAN KEZIA MOYER 74y Male  MRN#: [id number]   CODE STATUS: FULL      SUBJECTIVE  Patient is a 74y old Male who presents with a chief complaint of COVID (29 Mar 2020 09:09)    Currently admitted to medicine with the primary diagnosis of SARS COV2 pneumonia    Today is hospital day 4d,   INTERVAL HPI/OVERNIGHT EVENTS: none. Currently patient is comfortable.     Spoke to patient over phone  this morning , he is comfortable.     Present Today:           Earl Catheter (x)No/ ()Yes?   Indication:             Central Line (x)No/ ()Yes?   Indication:          IV Fluids (x)No/ ()Yes? Type:  Rate:  Indication:    OBJECTIVE  PAST MEDICAL & SURGICAL HISTORY  CAD (coronary artery disease)  Kidney stones  High cholesterol  HTN (hypertension)  S/P drug eluting coronary stent placement: &gt; 10 years ago    ALLERGIES:  No Known Allergies    HOME MEDICATIONS:  Home Medications:  CLOPIDOGREL  75 MG TABS:  (26 Mar 2020 01:49)  FEBUXOSTAT  40 MG TABS:  (26 Mar 2020 01:49)  FUROSEMIDE  40 MG TABS:  (26 Mar 2020 01:49)  METOPROLOL TARTRATE  25 MG TABS:  (26 Mar 2020 01:49)  PIOGLITAZONE HYDROCHLORIDE  30 MG TABS:  (26 Mar 2020 01:49)  ROSUVASTATIN CALCIUM  20 MG TABS:  (26 Mar 2020 01:48)    MEDICATIONS:  STANDING MEDICATIONS  atorvastatin 40 milliGRAM(s) Oral at bedtime  clopidogrel Tablet 75 milliGRAM(s) Oral daily  heparin  Injectable 5000 Unit(s) SubCutaneous every 12 hours  hydroxychloroquine   Oral   hydroxychloroquine 200 milliGRAM(s) Oral every 12 hours  magnesium oxide 400 milliGRAM(s) Oral three times a day with meals  metoprolol tartrate 25 milliGRAM(s) Oral two times a day  sodium bicarbonate 325 milliGRAM(s) Oral two times a day    PRN MEDICATIONS  acetaminophen   Tablet .. 650 milliGRAM(s) Oral every 6 hours PRN      VITAL SIGNS: Last 24 Hours  T(C): 36.9 (30 Mar 2020 06:00), Max: 38.3 (29 Mar 2020 17:30)  T(F): 98.5 (30 Mar 2020 06:00), Max: 101 (29 Mar 2020 22:14)  HR: 72 (30 Mar 2020 06:00) (65 - 82)  BP: 153/72 (30 Mar 2020 06:00) (134/59 - 167/74)  RR: 20 (30 Mar 2020 06:00) (20 - 20)  SpO2: 92% (30 Mar 2020 06:00) (90% - 98%)    LABS:                        11.3   7.47  )-----------( 225      ( 30 Mar 2020 06:17 )             35.4     03-30    143  |  109  |  29<H>  ----------------------------<  94  4.0   |  22  |  1.5    Ca    7.8<L>      30 Mar 2020 06:17  Mg     1.8     03-30    TPro  5.4<L>  /  Alb  2.7<L>  /  TBili  0.9  /  DBili  x   /  AST  79<H>  /  ALT  56<H>  /  AlkPhos  92  03-30    RADIOLOGY:  Xray Chest 1 View- PORTABLE-Routine:   EXAM:  XR CHEST PORTABLE ROUTINE 1V            PROCEDURE DATE:  03/29/2020    INTERPRETATION:  Clinical History / Reason for exam: Shortness of breath    Comparison : Chest radiograph March 27, 2020.    Technique/Positioning: Single AP viewof the chest.    Findings:    Support devices: None.    Cardiac/mediastinum/hilum: Unchanged.    Lung parenchyma/Pleura: Unchanged diffuse airspace and interstitial opacifications. No pneumothorax.    Skeleton/soft tissues: Unchanged.    Impression:    Stable bilateral airspace and interstitial opacifications consistent with viral pneumonia.    ELLIOT LANDAU M.D., ATTENDING RADIOLOGIST  This document has been electronically signed. 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 Associated with SOB and subjective fevers for the past x 1 day. Denies any sore throat, runny nose, CP, palpitations, dysuria, flank pain, LE swelling, or N/V/D. + sick contact (wife with similar symptoms). Retired. No recent travel. In the ED, Chest X-Ray with extensive B/L interstitial opacities concerning for viral pneumonia (no official read). High supplemental O2 requirements. Also, noted to have KEEGAN. COVID pcr is positive. Was started on plaquenil     #Acute hypoxic respiratory failure likely SARS COV2 pneumonia  - saturating  94% on room air,pt feels better    - CXR on admission:  diffuse bilateral interstitial and airspace opacities  - COVID-19 postive; flu rsv negative  - Procalcitonin: 0.28 CRP: 9  LDH:  D-dimer :61 fibrinogen>700  - Liver Biochemical Testing Trend:  - Bilirubin Direct, Serum: 0.3 (03-27-20 @ 06:39)  - Aspartate Aminotransferase (AST/SGOT): 109<<120<<119   - Alanine Aminotransferase (ALT/SGPT): 51<<47<<45 ; hold statin   - repeat CXR: worsening opacities   - CXR: 3/29: stable opacities   - EKG on admission: sinus tachycardia QTC 444ms  - EKG: 3/27: sinus tachy; QTC 462ms  - On plaquanil   - ID consulted; recs appreciated  - sent IL-6 x1, ferritin, fibrinogen, CRP, TAG q48h to monitor for signs of cytokine release syndrome    - Supportive care: tylenol  pain, anti- tussive, antiemetics  - air bone ,contact, droplet precautions  - monitor for hypoxia , upgrade to ICU if deteriorates    #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 ~ 2.1 - improving  - likely prerenal due to fever, decreased po intake, lasix/ Doubt ATN.  - Creatinine Trend: 1.9<--, 3.0<--, 3.6<--, 4.2<--  - non oliguric  - check UA, urine creatinine, NA, Urea, Pr/Cr ratio  - US renal pending  - Renal is following.   - IVFs (LR).   - Monitor urinary out-put.   -  PTH and Vit D. Hold nephrotoxic agents.    # Mild HAGMA likely from elevated uremic acid levels.- resolved  - IVFs (LR). Trend BMPs  - c/w po bicarb 325 mg q 12 hr as per nephro    #Hx of HTN, HLD, and CAD s/p stent.   - Restart home meds, hold nephrotoxic agents.   - hold statin as lfts>75    ACTIVITY: Increase as tolerated   DVT PPX:   GI PPX: not indicated   DIET: DASH   CODE: full   DIsposition: from home; possible dc today

## 2020-03-30 NOTE — DISCHARGE NOTE PROVIDER - NSDCFUADDINST_GEN_ALL_CORE_FT
73 yo M/F was admitted to [facility] on 3/26 for hypoxia secondary to COVID19 infection. Patient was transferred to [facility] on 4/18.     Patient should contact primary medical provider within 2 weeks to follow up after discharge from hospital

## 2020-03-30 NOTE — DISCHARGE NOTE PROVIDER - NSDCACTIVITY_GEN_ALL_CORE
No restrictions No restrictions/Showering allowed/Bathing allowed/Stairs allowed/Walking - Outdoors allowed/Walking - Indoors allowed

## 2020-03-31 LAB
ALBUMIN SERPL ELPH-MCNC: 2.6 G/DL — LOW (ref 3.5–5.2)
ALP SERPL-CCNC: 89 U/L — SIGNIFICANT CHANGE UP (ref 30–115)
ALT FLD-CCNC: 49 U/L — HIGH (ref 0–41)
ANION GAP SERPL CALC-SCNC: 10 MMOL/L — SIGNIFICANT CHANGE UP (ref 7–14)
AST SERPL-CCNC: 67 U/L — HIGH (ref 0–41)
BASOPHILS # BLD AUTO: 0.02 K/UL — SIGNIFICANT CHANGE UP (ref 0–0.2)
BASOPHILS NFR BLD AUTO: 0.3 % — SIGNIFICANT CHANGE UP (ref 0–1)
BILIRUB SERPL-MCNC: 0.7 MG/DL — SIGNIFICANT CHANGE UP (ref 0.2–1.2)
BUN SERPL-MCNC: 25 MG/DL — HIGH (ref 10–20)
CALCIUM SERPL-MCNC: 7.7 MG/DL — LOW (ref 8.5–10.1)
CHLORIDE SERPL-SCNC: 107 MMOL/L — SIGNIFICANT CHANGE UP (ref 98–110)
CO2 SERPL-SCNC: 23 MMOL/L — SIGNIFICANT CHANGE UP (ref 17–32)
CREAT SERPL-MCNC: 1.5 MG/DL — SIGNIFICANT CHANGE UP (ref 0.7–1.5)
EOSINOPHIL # BLD AUTO: 0.04 K/UL — SIGNIFICANT CHANGE UP (ref 0–0.7)
EOSINOPHIL NFR BLD AUTO: 0.5 % — SIGNIFICANT CHANGE UP (ref 0–8)
GLUCOSE SERPL-MCNC: 90 MG/DL — SIGNIFICANT CHANGE UP (ref 70–99)
HCT VFR BLD CALC: 32.4 % — LOW (ref 42–52)
HGB BLD-MCNC: 10.8 G/DL — LOW (ref 14–18)
IMM GRANULOCYTES NFR BLD AUTO: 0.5 % — HIGH (ref 0.1–0.3)
LYMPHOCYTES # BLD AUTO: 1.03 K/UL — LOW (ref 1.2–3.4)
LYMPHOCYTES # BLD AUTO: 13.8 % — LOW (ref 20.5–51.1)
MCHC RBC-ENTMCNC: 30.5 PG — SIGNIFICANT CHANGE UP (ref 27–31)
MCHC RBC-ENTMCNC: 33.3 G/DL — SIGNIFICANT CHANGE UP (ref 32–37)
MCV RBC AUTO: 91.5 FL — SIGNIFICANT CHANGE UP (ref 80–94)
MONOCYTES # BLD AUTO: 0.83 K/UL — HIGH (ref 0.1–0.6)
MONOCYTES NFR BLD AUTO: 11.2 % — HIGH (ref 1.7–9.3)
NEUTROPHILS # BLD AUTO: 5.48 K/UL — SIGNIFICANT CHANGE UP (ref 1.4–6.5)
NEUTROPHILS NFR BLD AUTO: 73.7 % — SIGNIFICANT CHANGE UP (ref 42.2–75.2)
NRBC # BLD: 0 /100 WBCS — SIGNIFICANT CHANGE UP (ref 0–0)
NT-PROBNP SERPL-SCNC: 3322 PG/ML — HIGH (ref 0–300)
PLATELET # BLD AUTO: 237 K/UL — SIGNIFICANT CHANGE UP (ref 130–400)
POTASSIUM SERPL-MCNC: 4.1 MMOL/L — SIGNIFICANT CHANGE UP (ref 3.5–5)
POTASSIUM SERPL-SCNC: 4.1 MMOL/L — SIGNIFICANT CHANGE UP (ref 3.5–5)
PROCALCITONIN SERPL-MCNC: 0.22 NG/ML — HIGH (ref 0.02–0.1)
PROT SERPL-MCNC: 5.2 G/DL — LOW (ref 6–8)
RBC # BLD: 3.54 M/UL — LOW (ref 4.7–6.1)
RBC # FLD: 13.8 % — SIGNIFICANT CHANGE UP (ref 11.5–14.5)
SODIUM SERPL-SCNC: 140 MMOL/L — SIGNIFICANT CHANGE UP (ref 135–146)
TROPONIN T SERPL-MCNC: 0.04 NG/ML — CRITICAL HIGH
WBC # BLD: 7.44 K/UL — SIGNIFICANT CHANGE UP (ref 4.8–10.8)
WBC # FLD AUTO: 7.44 K/UL — SIGNIFICANT CHANGE UP (ref 4.8–10.8)

## 2020-03-31 PROCEDURE — 93010 ELECTROCARDIOGRAM REPORT: CPT

## 2020-03-31 PROCEDURE — 99233 SBSQ HOSP IP/OBS HIGH 50: CPT

## 2020-03-31 RX ORDER — AZITHROMYCIN 500 MG/1
500 TABLET, FILM COATED ORAL ONCE
Refills: 0 | Status: COMPLETED | OUTPATIENT
Start: 2020-03-31 | End: 2020-03-31

## 2020-03-31 RX ORDER — FUROSEMIDE 40 MG
40 TABLET ORAL ONCE
Refills: 0 | Status: COMPLETED | OUTPATIENT
Start: 2020-03-31 | End: 2020-03-31

## 2020-03-31 RX ORDER — AZITHROMYCIN 500 MG/1
250 TABLET, FILM COATED ORAL DAILY
Refills: 0 | Status: COMPLETED | OUTPATIENT
Start: 2020-04-01 | End: 2020-04-04

## 2020-03-31 RX ADMIN — MAGNESIUM OXIDE 400 MG ORAL TABLET 400 MILLIGRAM(S): 241.3 TABLET ORAL at 11:37

## 2020-03-31 RX ADMIN — HEPARIN SODIUM 5000 UNIT(S): 5000 INJECTION INTRAVENOUS; SUBCUTANEOUS at 16:32

## 2020-03-31 RX ADMIN — MAGNESIUM OXIDE 400 MG ORAL TABLET 400 MILLIGRAM(S): 241.3 TABLET ORAL at 13:05

## 2020-03-31 RX ADMIN — Medication 25 MILLIGRAM(S): at 04:12

## 2020-03-31 RX ADMIN — Medication 200 MILLIGRAM(S): at 04:12

## 2020-03-31 RX ADMIN — CLOPIDOGREL BISULFATE 75 MILLIGRAM(S): 75 TABLET, FILM COATED ORAL at 11:37

## 2020-03-31 RX ADMIN — Medication 325 MILLIGRAM(S): at 04:12

## 2020-03-31 RX ADMIN — AZITHROMYCIN 500 MILLIGRAM(S): 500 TABLET, FILM COATED ORAL at 16:31

## 2020-03-31 RX ADMIN — ATORVASTATIN CALCIUM 40 MILLIGRAM(S): 80 TABLET, FILM COATED ORAL at 20:39

## 2020-03-31 RX ADMIN — Medication 325 MILLIGRAM(S): at 16:32

## 2020-03-31 RX ADMIN — Medication 40 MILLIGRAM(S): at 08:00

## 2020-03-31 RX ADMIN — Medication 25 MILLIGRAM(S): at 16:32

## 2020-03-31 RX ADMIN — HEPARIN SODIUM 5000 UNIT(S): 5000 INJECTION INTRAVENOUS; SUBCUTANEOUS at 04:12

## 2020-03-31 RX ADMIN — MAGNESIUM OXIDE 400 MG ORAL TABLET 400 MILLIGRAM(S): 241.3 TABLET ORAL at 16:32

## 2020-03-31 NOTE — PROGRESS NOTE ADULT - SUBJECTIVE AND OBJECTIVE BOX
Progress Note:  Provider Speciality                            Hospitalist      KEZIA MOYER [id number] 74y Male     CHIEF PRESENTING COMPLAINT:  Patient is a 74y old  Male who presents with a chief complaint of COVID+ (27 Mar 2020 11:23)        SUBJECTIVE:  Patient was seen and examined at bedside. Reports no new complaints but seen having dry cough   No significant overnight events reported.     HISTORY OF PRESENTING ILLNESS:  HPI:  75 yo male with PMH HTN, HLD, CAD s/p stent presents c/o cough x 1 week. Pt states he had low grade temp and has had slight decreased appetite, was started on an oral antibiotic by his PMD. Today was having some SOB and Jarrett was called and noted him to be hypoxic. Pt denies any chest pain, palpitations, N/V/D or abdominal pain. Wife also with URI sx. No recent travel.  Pt also found to have elevated Creatinine (26 Mar 2020 01:46)        REVIEW OF SYSTEMS:  Patient denies any headache, any vision complaints, runny nose, fever, chills, sore throat. Denies chest pain, palpitation. Denies nausea, vomiting, abdominal pain, diarrhoea, Denies urinary burning, urgency, frequency, dysuria. Denies weakness in any part of the body or numbness.   At least 10 systems were reviewed in ROS. All systems reviewed  are within normal limits except for the complaints as described in Subjective.    PAST MEDICAL & SURGICAL HISTORY:  PAST MEDICAL & SURGICAL HISTORY:  CAD (coronary artery disease)  Kidney stones  High cholesterol  HTN (hypertension)  S/P drug eluting coronary stent placement: &gt; 10 years ago          VITAL SIGNS:  Vital Signs Last 24 Hrs  T(C): 37.3 (31 Mar 2020 12:00), Max: 38.7 (30 Mar 2020 16:30)  T(F): 99.2 (31 Mar 2020 12:00), Max: 101.7 (30 Mar 2020 16:30)  HR: 78 (31 Mar 2020 12:00) (66 - 83)  BP: 155/71 (31 Mar 2020 12:00) (139/65 - 172/73)  BP(mean): --  RR: 21 (31 Mar 2020 12:00) (20 - 25)  SpO2: 95% (31 Mar 2020 12:00) (92% - 96%)    PHYSICAL EXAMINATION:  In mild respiratory  distress  General: No pallor, no icterus  HEENT:   EOMI, no JVD,.  Heart: S1+S2 audible  Lungs: bilateral  reduced air entry, mild bilateral ronchi  Abdomen: Soft, non-tender, non-distended , no  rigidity or guarding.  CNS: AAOx3, CN  grossly intact.  Extremities:  No edema            CONSULTS:  Consultant(s) Notes Reviewed by me.   Care Discussed with Consultants/Other Providers where required.        MEDICATIONS:  MEDICATIONS  (STANDING):  atorvastatin 40 milliGRAM(s) Oral at bedtime  clopidogrel Tablet 75 milliGRAM(s) Oral daily  heparin  Injectable 5000 Unit(s) SubCutaneous every 12 hours  hydroxychloroquine   Oral   hydroxychloroquine 200 milliGRAM(s) Oral every 12 hours  metoprolol tartrate 25 milliGRAM(s) Oral two times a day  sodium bicarbonate 325 milliGRAM(s) Oral two times a day  sodium chloride 0.9%. 1000 milliLiter(s) (75 mL/Hr) IV Continuous <Continuous>    MEDICATIONS  (PRN):            ASSESSMENT:      75 yo male with PMH HTN, HLD, CAD s/p stent presents c/o cough x 1 week. Pt states he had low grade temp and has had slight decreased appetite, was started     on an oral antibiotic by his PMD.     ASSESSMENT:  Principal Diagnosis:  Viral Pneumonia secondary to COVID-19  Acute kidney injury on Chronic renal disease stage 3-possibly pre-renal-improving  Metabolic acidosis    Associated Active Comorbid Conditions:  Hypertension   Coronary artery disease status post PCI  Dyslipidemia       PLAN:   COVID positive  CXR with worsening bilateral opacities   Isolation precautions (contact, airborne, droplet)  Supportive care including  PRN analgesics, anti-tussives, anti-emetics, anti-pyretics  Daily CBC’s and LFT’s   ON Plaquenil .   No antibiotics for now. procalcitonin 0.28  Elevated CRP, ferritin  oxygen - switched from NC to RA, if O saturation > 90% on ambulation , discharge to home today w  Acute kidney injury - improving .   Added onwe dose of lasix IV today for possible fluid overload  continue with  home meds for chronic comorbidities    Progress note Handoff:   Pending: Medical stability  Discussion: Diagnosis, current management plan and further plan of care discussed with patient  and housestaff in morning  rounds.  Disposition: Home possibly when Osat on RA >90 at rest or >88 with ambulation on RA Progress Note:  Provider Speciality                            Hospitalist      KEZIA MOYER [id number] 74y Male     CHIEF PRESENTING COMPLAINT:  Patient is a 74y old  Male who presents with a chief complaint of COVID+ (27 Mar 2020 11:23)        SUBJECTIVE:  Patient was seen and examined at bedside. Reports no new complaints but seen having dry cough   No significant overnight events reported.     HISTORY OF PRESENTING ILLNESS:  HPI:  73 yo male with PMH HTN, HLD, CAD s/p stent presents c/o cough x 1 week. Pt states he had low grade temp and has had slight decreased appetite, was started on an oral antibiotic by his PMD. Today was having some SOB and Jarrett was called and noted him to be hypoxic. Pt denies any chest pain, palpitations, N/V/D or abdominal pain. Wife also with URI sx. No recent travel.  Pt also found to have elevated Creatinine (26 Mar 2020 01:46)        REVIEW OF SYSTEMS:  Patient denies any headache, any vision complaints, runny nose, fever, chills, sore throat. Denies chest pain, palpitation. Denies nausea, vomiting, abdominal pain, diarrhoea, Denies urinary burning, urgency, frequency, dysuria. Denies weakness in any part of the body or numbness.   At least 10 systems were reviewed in ROS. All systems reviewed  are within normal limits except for the complaints as described in Subjective.    PAST MEDICAL & SURGICAL HISTORY:  PAST MEDICAL & SURGICAL HISTORY:  CAD (coronary artery disease)  Kidney stones  High cholesterol  HTN (hypertension)  S/P drug eluting coronary stent placement: &gt; 10 years ago          VITAL SIGNS:  Vital Signs Last 24 Hrs  T(C): 37.3 (31 Mar 2020 12:00), Max: 38.7 (30 Mar 2020 16:30)  T(F): 99.2 (31 Mar 2020 12:00), Max: 101.7 (30 Mar 2020 16:30)  HR: 78 (31 Mar 2020 12:00) (66 - 83)  BP: 155/71 (31 Mar 2020 12:00) (139/65 - 172/73)  BP(mean): --  RR: 21 (31 Mar 2020 12:00) (20 - 25)  SpO2: 95% (31 Mar 2020 12:00) (92% - 96%)    PHYSICAL EXAMINATION:  In mild respiratory  distress  General: No pallor, no icterus  HEENT:   EOMI, no JVD,.  Heart: S1+S2 audible  Lungs: bilateral  reduced air entry, mild bilateral ronchi  Abdomen: Soft, non-tender, non-distended , no  rigidity or guarding.  CNS: AAOx3, CN  grossly intact.  Extremities:  No edema            CONSULTS:  Consultant(s) Notes Reviewed by me.   Care Discussed with Consultants/Other Providers where required.        MEDICATIONS:  MEDICATIONS  (STANDING):  atorvastatin 40 milliGRAM(s) Oral at bedtime  clopidogrel Tablet 75 milliGRAM(s) Oral daily  heparin  Injectable 5000 Unit(s) SubCutaneous every 12 hours  hydroxychloroquine   Oral   hydroxychloroquine 200 milliGRAM(s) Oral every 12 hours  metoprolol tartrate 25 milliGRAM(s) Oral two times a day  sodium bicarbonate 325 milliGRAM(s) Oral two times a day  sodium chloride 0.9%. 1000 milliLiter(s) (75 mL/Hr) IV Continuous <Continuous>    MEDICATIONS  (PRN):            ASSESSMENT:      73 yo male with PMH HTN, HLD, CAD s/p stent presents c/o cough x 1 week. Pt states he had low grade temp and has had slight decreased appetite, was started     on an oral antibiotic by his PMD.     ASSESSMENT:  Principal Diagnosis:  Viral Pneumonia secondary to COVID-19  Acute kidney injury on Chronic renal disease stage 3-possibly pre-renal-improving  Metabolic acidosis    Associated Active Comorbid Conditions:  Hypertension   Coronary artery disease status post PCI  Dyslipidemia       PLAN:   COVID positive  CXR with worsening bilateral opacities   Isolation precautions (contact, airborne, droplet)  Supportive care including  PRN analgesics, anti-tussives, anti-emetics, anti-pyretics  Daily CBC’s and LFT’s   ON Plaquenil .   No antibiotics for now. procalcitonin 0.28  Elevated CRP, ferritin  oxygen - on NC 2 L in AM. will watch on RA again today and ambulate. Pt desaturate in low 80's yesterday when ambulated  Acute kidney injury - improving .   Added onwe dose of lasix IV today for possible fluid overload  continue with  home meds for chronic comorbidities    Progress note Handoff:   Pending: Medical stability  Discussion: Diagnosis, current management plan and further plan of care discussed with patient  and housestaff in morning  rounds.  Disposition: Home possibly when Osat on RA >90 at rest or >88 with ambulation on RA

## 2020-03-31 NOTE — PROGRESS NOTE ADULT - SUBJECTIVE AND OBJECTIVE BOX
KEZIA MOYER 74y Male  MRN#: [id number]   CODE STATUS: FULL      SUBJECTIVE  Patient is a 74y old Male who presents with a chief complaint of COVID+ (30 Mar 2020 09:53)    Currently admitted to medicine with the primary diagnosis of SARS COV2 pneumonia    Today is hospital day 5d,   INTERVAL HPI/OVERNIGHT EVENTS: patient's saturation is 84%  on room air. Currently saturating at 95% on 4L    Present Today:           Earl Catheter (x)No/ ()Yes?   Indication:             Central Line (x)No/ ()Yes?   Indication:          IV Fluids (x)No/ ()Yes? Type:  Rate:  Indication:    OBJECTIVE  PAST MEDICAL & SURGICAL HISTORY  CAD (coronary artery disease)  Kidney stones  High cholesterol  HTN (hypertension)  S/P drug eluting coronary stent placement: &gt; 10 years ago    ALLERGIES:  No Known Allergies    HOME MEDICATIONS:  Home Medications:  clopidogrel 75 mg oral tablet: 1 tab(s) orally once a day (30 Mar 2020 10:01)  FEBUXOSTAT  40 MG TABS:  (26 Mar 2020 01:49)  FUROSEMIDE  40 MG TABS:  (26 Mar 2020 01:49)  metoprolol tartrate 25 mg oral tablet: 1 tab(s) orally 2 times a day (30 Mar 2020 10:01)  PIOGLITAZONE HYDROCHLORIDE  30 MG TABS:  (26 Mar 2020 01:49)  ROSUVASTATIN CALCIUM  20 MG TABS:  (26 Mar 2020 01:48)    MEDICATIONS:  STANDING MEDICATIONS  atorvastatin 40 milliGRAM(s) Oral at bedtime  clopidogrel Tablet 75 milliGRAM(s) Oral daily  heparin  Injectable 5000 Unit(s) SubCutaneous every 12 hours  magnesium oxide 400 milliGRAM(s) Oral three times a day with meals  metoprolol tartrate 25 milliGRAM(s) Oral two times a day  sodium bicarbonate 325 milliGRAM(s) Oral two times a day    PRN MEDICATIONS  acetaminophen   Tablet .. 650 milliGRAM(s) Oral every 6 hours PRN      VITAL SIGNS: Last 24 Hours  T(C): 37.3 (31 Mar 2020 12:00), Max: 38.7 (30 Mar 2020 16:30)  T(F): 99.2 (31 Mar 2020 12:00), Max: 101.7 (30 Mar 2020 16:30)  HR: 78 (31 Mar 2020 12:00) (66 - 83)  BP: 155/71 (31 Mar 2020 12:00) (139/65 - 172/73)  RR: 21 (31 Mar 2020 12:00) (20 - 25)  SpO2: 95% (31 Mar 2020 12:00) (92% - 96%)    LABS:                        10.8   7.44  )-----------( 237      ( 31 Mar 2020 04:30 )             32.4     03-31    140  |  107  |  25<H>  ----------------------------<  90  4.1   |  23  |  1.5    Ca    7.7<L>      31 Mar 2020 04:30  Mg     1.8     03-30    TPro  5.2<L>  /  Alb  2.6<L>  /  TBili  0.7  /  DBili  x   /  AST  67<H>  /  ALT  49<H>  /  AlkPhos  89  03-31    Troponin T, Serum: 0.04 ng/mL <HH> (03-30-20 @ 23:25)    CARDIAC MARKERS ( 30 Mar 2020 23:25 )  x     / 0.04 ng/mL / x     / x     / x        Serum Pro-Brain Natriuretic Peptide: 3322 pg/mL (03.30.20 @ 23:25)        RADIOLOGY:  Xray Chest 1 View- PORTABLE-Routine (03.30.20 @ 11:03) >  Worsened diffuse bilateral opacities.    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 Associated with SOB and subjective fevers for the past x 1 day. Denies any sore throat, runny nose, CP, palpitations, dysuria, flank pain, LE swelling, or N/V/D. + sick contact (wife with similar symptoms). Retired. No recent travel. In the ED, Chest X-Ray with extensive B/L interstitial opacities concerning for viral pneumonia (no official read). High supplemental O2 requirements. Also, noted to have KEEGAN. COVID pcr is positive. Was started on plaquenil     #Acute hypoxic respiratory failure likely SARS COV2 pneumonia  - saturating  95% on on 4L, saturating on room air: 84%    - CXR on admission:  diffuse bilateral interstitial and airspace opacities  - COVID-19 postive; flu rsv negative  - Procalcitonin: 0.28 CRP: 9>>9.63  LDH:  D-dimer :61 fibrinogen>700  - Liver Biochemical Testing Trend:  - Bilirubin Direct, Serum: 0.3 (03-27-20 @ 06:39)  - Aspartate Aminotransferase (AST/SGOT): 109<<120<<119   - Alanine Aminotransferase (ALT/SGPT): 51<<47<<45 ; hold statin ; can dc home on statin  - repeat CXR: worsening opacities   - CXR: 3/29: stable opacities   - CXR 3/20: worsened B/l INTERSTITAL OPACITIES  - EKG on admission: sinus tachycardia QTC 444ms  - EKG: 3/27: sinus tachy; QTC 462ms  - EKG: 3/29: 432ms  - s/p plaquanil, started on azithro  - BNP: 3422; trop: 0.04; ordered ECHO  - ID consulted; recs appreciated  - sent IL-6 x1, ferritin, fibrinogen, CRP, TAG q48h to monitor for signs of cytokine release syndrome    - Supportive care: tylenol  pain, anti- tussive, antiemetics  - air bone ,contact, droplet precautions  - monitor for hypoxia , upgrade to ICU if deteriorates    #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 ~ 2.1 - resolved  - likely prerenal due to fever, decreased po intake, lasix/ Doubt ATN.  - Creatinine Trend: 1.5<--, 1.5<--, 1.9<--, 3.0<--, 3.6<--, 4.2<--  - non oliguric  - check UA, urine creatinine, NA, Urea, Pr/Cr ratio  - US renal pending  - Renal is following.   - Hold nephrotoxic agents.    # Mild HAGMA likely from elevated uremic acid levels.- resolved  - IVFs (LR). Trend BMPs  - c/w po bicarb 325 mg q 12 hr as per nephro    #Hx of HTN, HLD, and CAD s/p stent.   - Restart home meds, hold nephrotoxic agents.   - hold statin as lfts>75    ACTIVITY: Increase as tolerated   DVT PPX:   GI PPX: not indicated   DIET: DASH   CODE: full   DIsposition: from home; possible dc today   Pending: ECHO, clinical improvement

## 2020-04-01 LAB
ALBUMIN SERPL ELPH-MCNC: 2.9 G/DL — LOW (ref 3.5–5.2)
ALP SERPL-CCNC: 113 U/L — SIGNIFICANT CHANGE UP (ref 30–115)
ALT FLD-CCNC: 60 U/L — HIGH (ref 0–41)
ANION GAP SERPL CALC-SCNC: 13 MMOL/L — SIGNIFICANT CHANGE UP (ref 7–14)
AST SERPL-CCNC: 83 U/L — HIGH (ref 0–41)
BASOPHILS # BLD AUTO: 0.02 K/UL — SIGNIFICANT CHANGE UP (ref 0–0.2)
BASOPHILS NFR BLD AUTO: 0.2 % — SIGNIFICANT CHANGE UP (ref 0–1)
BILIRUB SERPL-MCNC: 1 MG/DL — SIGNIFICANT CHANGE UP (ref 0.2–1.2)
BUN SERPL-MCNC: 23 MG/DL — HIGH (ref 10–20)
CALCIUM SERPL-MCNC: 8.1 MG/DL — LOW (ref 8.5–10.1)
CHLORIDE SERPL-SCNC: 104 MMOL/L — SIGNIFICANT CHANGE UP (ref 98–110)
CO2 SERPL-SCNC: 23 MMOL/L — SIGNIFICANT CHANGE UP (ref 17–32)
CREAT SERPL-MCNC: 1.6 MG/DL — HIGH (ref 0.7–1.5)
CRP SERPL-MCNC: 12.15 MG/DL — HIGH (ref 0–0.4)
EOSINOPHIL # BLD AUTO: 0.03 K/UL — SIGNIFICANT CHANGE UP (ref 0–0.7)
EOSINOPHIL NFR BLD AUTO: 0.3 % — SIGNIFICANT CHANGE UP (ref 0–8)
FIBRINOGEN PPP-MCNC: >700 MG/DL — HIGH (ref 204.4–570.6)
GLUCOSE SERPL-MCNC: 101 MG/DL — HIGH (ref 70–99)
HCT VFR BLD CALC: 36.9 % — LOW (ref 42–52)
HGB BLD-MCNC: 12.1 G/DL — LOW (ref 14–18)
IL6 FLD-MCNC: 165.4 PG/ML — HIGH (ref 0–15.5)
IMM GRANULOCYTES NFR BLD AUTO: 0.5 % — HIGH (ref 0.1–0.3)
LDH SERPL L TO P-CCNC: 646 U/L — HIGH (ref 50–242)
LYMPHOCYTES # BLD AUTO: 1.26 K/UL — SIGNIFICANT CHANGE UP (ref 1.2–3.4)
LYMPHOCYTES # BLD AUTO: 14.3 % — LOW (ref 20.5–51.1)
MAGNESIUM SERPL-MCNC: 2 MG/DL — SIGNIFICANT CHANGE UP (ref 1.8–2.4)
MCHC RBC-ENTMCNC: 30.6 PG — SIGNIFICANT CHANGE UP (ref 27–31)
MCHC RBC-ENTMCNC: 32.8 G/DL — SIGNIFICANT CHANGE UP (ref 32–37)
MCV RBC AUTO: 93.2 FL — SIGNIFICANT CHANGE UP (ref 80–94)
MONOCYTES # BLD AUTO: 0.84 K/UL — HIGH (ref 0.1–0.6)
MONOCYTES NFR BLD AUTO: 9.6 % — HIGH (ref 1.7–9.3)
NEUTROPHILS # BLD AUTO: 6.6 K/UL — HIGH (ref 1.4–6.5)
NEUTROPHILS NFR BLD AUTO: 75.1 % — SIGNIFICANT CHANGE UP (ref 42.2–75.2)
NRBC # BLD: 0 /100 WBCS — SIGNIFICANT CHANGE UP (ref 0–0)
PLATELET # BLD AUTO: 254 K/UL — SIGNIFICANT CHANGE UP (ref 130–400)
POTASSIUM SERPL-MCNC: 4.3 MMOL/L — SIGNIFICANT CHANGE UP (ref 3.5–5)
POTASSIUM SERPL-SCNC: 4.3 MMOL/L — SIGNIFICANT CHANGE UP (ref 3.5–5)
PROCALCITONIN SERPL-MCNC: 0.22 NG/ML — HIGH (ref 0.02–0.1)
PROT SERPL-MCNC: 6.1 G/DL — SIGNIFICANT CHANGE UP (ref 6–8)
RBC # BLD: 3.96 M/UL — LOW (ref 4.7–6.1)
RBC # FLD: 13.8 % — SIGNIFICANT CHANGE UP (ref 11.5–14.5)
SODIUM SERPL-SCNC: 140 MMOL/L — SIGNIFICANT CHANGE UP (ref 135–146)
WBC # BLD: 8.79 K/UL — SIGNIFICANT CHANGE UP (ref 4.8–10.8)
WBC # FLD AUTO: 8.79 K/UL — SIGNIFICANT CHANGE UP (ref 4.8–10.8)

## 2020-04-01 PROCEDURE — 99233 SBSQ HOSP IP/OBS HIGH 50: CPT

## 2020-04-01 PROCEDURE — 71045 X-RAY EXAM CHEST 1 VIEW: CPT | Mod: 26

## 2020-04-01 PROCEDURE — 93010 ELECTROCARDIOGRAM REPORT: CPT

## 2020-04-01 RX ADMIN — Medication 325 MILLIGRAM(S): at 05:00

## 2020-04-01 RX ADMIN — ATORVASTATIN CALCIUM 40 MILLIGRAM(S): 80 TABLET, FILM COATED ORAL at 21:12

## 2020-04-01 RX ADMIN — MAGNESIUM OXIDE 400 MG ORAL TABLET 400 MILLIGRAM(S): 241.3 TABLET ORAL at 17:27

## 2020-04-01 RX ADMIN — Medication 650 MILLIGRAM(S): at 21:12

## 2020-04-01 RX ADMIN — Medication 25 MILLIGRAM(S): at 05:01

## 2020-04-01 RX ADMIN — HEPARIN SODIUM 5000 UNIT(S): 5000 INJECTION INTRAVENOUS; SUBCUTANEOUS at 17:27

## 2020-04-01 RX ADMIN — AZITHROMYCIN 250 MILLIGRAM(S): 500 TABLET, FILM COATED ORAL at 10:40

## 2020-04-01 RX ADMIN — CLOPIDOGREL BISULFATE 75 MILLIGRAM(S): 75 TABLET, FILM COATED ORAL at 10:40

## 2020-04-01 RX ADMIN — Medication 650 MILLIGRAM(S): at 06:21

## 2020-04-01 RX ADMIN — MAGNESIUM OXIDE 400 MG ORAL TABLET 400 MILLIGRAM(S): 241.3 TABLET ORAL at 10:40

## 2020-04-01 RX ADMIN — Medication 25 MILLIGRAM(S): at 17:27

## 2020-04-01 RX ADMIN — Medication 325 MILLIGRAM(S): at 17:28

## 2020-04-01 RX ADMIN — MAGNESIUM OXIDE 400 MG ORAL TABLET 400 MILLIGRAM(S): 241.3 TABLET ORAL at 10:39

## 2020-04-01 RX ADMIN — HEPARIN SODIUM 5000 UNIT(S): 5000 INJECTION INTRAVENOUS; SUBCUTANEOUS at 05:00

## 2020-04-01 NOTE — PROGRESS NOTE ADULT - SUBJECTIVE AND OBJECTIVE BOX
KEZIA MOYER 74y Male  MRN#: [id number]   CODE STATUS: FULL      SUBJECTIVE  Patient is a 74y old Male who presents with a chief complaint of COVID (31 Mar 2020 13:57)    Currently admitted to medicine with the primary diagnosis of SARS COV2 pneumonia    Today is hospital day 6d,   INTERVAL HPI/OVERNIGHT EVENTS: patient non compliant with nasal cannula.       Present Today:           Earl Catheter (x)No/ ()Yes?   Indication:             Central Line (x)No/ ()Yes?   Indication:          IV Fluids (x)No/ ()Yes? Type:  Rate:  Indication:    OBJECTIVE  PAST MEDICAL & SURGICAL HISTORY  CAD (coronary artery disease)  Kidney stones  High cholesterol  HTN (hypertension)  S/P drug eluting coronary stent placement: &gt; 10 years ago    ALLERGIES:  No Known Allergies    HOME MEDICATIONS:  Home Medications:  clopidogrel 75 mg oral tablet: 1 tab(s) orally once a day (30 Mar 2020 10:01)  FEBUXOSTAT  40 MG TABS:  (26 Mar 2020 01:49)  FUROSEMIDE  40 MG TABS:  (26 Mar 2020 01:49)  metoprolol tartrate 25 mg oral tablet: 1 tab(s) orally 2 times a day (30 Mar 2020 10:01)  PIOGLITAZONE HYDROCHLORIDE  30 MG TABS:  (26 Mar 2020 01:49)  ROSUVASTATIN CALCIUM  20 MG TABS:  (26 Mar 2020 01:48)    MEDICATIONS:  STANDING MEDICATIONS  atorvastatin 40 milliGRAM(s) Oral at bedtime  azithromycin   Tablet 250 milliGRAM(s) Oral daily  clopidogrel Tablet 75 milliGRAM(s) Oral daily  heparin  Injectable 5000 Unit(s) SubCutaneous every 12 hours  magnesium oxide 400 milliGRAM(s) Oral three times a day with meals  metoprolol tartrate 25 milliGRAM(s) Oral two times a day  sodium bicarbonate 325 milliGRAM(s) Oral two times a day    PRN MEDICATIONS  acetaminophen   Tablet .. 650 milliGRAM(s) Oral every 6 hours PRN      VITAL SIGNS: Last 24 Hours  T(C): 37.1 (01 Apr 2020 08:52), Max: 38.3 (01 Apr 2020 04:30)  T(F): 98.7 (01 Apr 2020 08:52), Max: 101 (01 Apr 2020 04:30)  HR: 65 (01 Apr 2020 08:52) (65 - 94)  BP: 149/65 (01 Apr 2020 08:52) (149/65 - 185/77)  RR: 18 (01 Apr 2020 08:52) (18 - 21)  SpO2: 91% (01 Apr 2020 08:52) (91% - 98%)    LABS:                        12.1   8.79  )-----------( 254      ( 01 Apr 2020 06:25 )             36.9     04-01    140  |  104  |  23<H>  ----------------------------<  101<H>  4.3   |  23  |  1.6<H>    Ca    8.1<L>      01 Apr 2020 06:25  Mg     2.0     04-01    TPro  6.1  /  Alb  2.9<L>  /  TBili  1.0  /  DBili  x   /  AST  83<H>  /  ALT  60<H>  /  AlkPhos  113  04-01    CARDIAC MARKERS ( 30 Mar 2020 23:25 )  x     / 0.04 ng/mL / x     / x     / x      RADIOLOGY:          ECHO:      PHYSICAL EXAM:    GENERAL: NAD, well-developed, AAOx3  HEENT:  Atraumatic, Normocephalic. 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 Associated with SOB and subjective fevers for the past x 1 day. Denies any sore throat, runny nose, CP, palpitations, dysuria, flank pain, LE swelling, or N/V/D. + sick contact (wife with similar symptoms). Retired. No recent travel. In the ED, Chest X-Ray with extensive B/L interstitial opacities concerning for viral pneumonia (no official read). High supplemental O2 requirements. Also, noted to have KEEGAN. COVID pcr is positive. Was started on plaquenil     #Acute hypoxic respiratory failure likely SARS COV2 pneumonia  - saturating  91% on on 4L, saturating on room air: 84%    - CXR on admission:  diffuse bilateral interstitial and airspace opacities  - COVID-19 postive; flu rsv negative  - Procalcitonin: 0.28 CRP: 9>>9.63  LDH: 646  D-dimer :61 fibrinogen>700  - Liver Biochemical Testing Trend:  - Bilirubin Direct, Serum: 0.3 (03-27-20 @ 06:39)  - Aspartate Aminotransferase (AST/SGOT): 109<<120<<119   - Alanine Aminotransferase (ALT/SGPT): 51<<47<<45 ; hold statin ; can dc home on statin  - repeat CXR: worsening opacities   - CXR: 3/29: stable opacities   - CXR 3/20: worsened B/l INTERSTITAL OPACITIES  - EKG on admission: sinus tachycardia QTC 444ms  - EKG: 3/27: sinus tachy; QTC 462ms  - EKG: 3/29: 432ms  - s/p plaquanil, started on azithro  - BNP: 3422; trop: 0.04; ordered ECHO- PENDING  - ID consulted; recs appreciated  - sent IL-6 x1, ferritin, fibrinogen, CRP, TAG q48h to monitor for signs of cytokine release syndrome    - Supportive care: tylenol  pain, anti- tussive, antiemetics  - air bone ,contact, droplet precautions  - monitor for hypoxia , upgrade to ICU if deteriorates    #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 ~ 2.1 - resolved  - likely prerenal due to fever, decreased po intake, lasix/ Doubt ATN.  - Creatinine Trend: 1.5<--, 1.5<--, 1.9<--, 3.0<--, 3.6<--, 4.2<--  - non oliguric  - check UA, urine creatinine, NA, Urea, Pr/Cr ratio  - US renal pending  - Renal is following.   - Hold nephrotoxic agents.    # Mild HAGMA likely from elevated uremic acid levels.- resolved  - IVFs (LR). Trend BMPs  - c/w po bicarb 325 mg q 12 hr as per nephro    #Hx of HTN, HLD, and CAD s/p stent.   - Restart home meds, hold nephrotoxic agents.   - hold statin as lfts>75    ACTIVITY: Increase as tolerated ( within room)   DVT PPX: HEPARIN  GI PPX: not indicated   DIET: DASH   CODE: full   DIsposition: from home; possible dc today   Pending: ECHO, clinical improvement

## 2020-04-02 LAB
ALBUMIN SERPL ELPH-MCNC: 2.7 G/DL — LOW (ref 3.5–5.2)
ALP SERPL-CCNC: 108 U/L — SIGNIFICANT CHANGE UP (ref 30–115)
ALT FLD-CCNC: 58 U/L — HIGH (ref 0–41)
ANION GAP SERPL CALC-SCNC: 13 MMOL/L — SIGNIFICANT CHANGE UP (ref 7–14)
AST SERPL-CCNC: 86 U/L — HIGH (ref 0–41)
BASOPHILS # BLD AUTO: 0.03 K/UL — SIGNIFICANT CHANGE UP (ref 0–0.2)
BASOPHILS NFR BLD AUTO: 0.3 % — SIGNIFICANT CHANGE UP (ref 0–1)
BILIRUB SERPL-MCNC: 0.9 MG/DL — SIGNIFICANT CHANGE UP (ref 0.2–1.2)
BUN SERPL-MCNC: 24 MG/DL — HIGH (ref 10–20)
CALCIUM SERPL-MCNC: 7.9 MG/DL — LOW (ref 8.5–10.1)
CHLORIDE SERPL-SCNC: 107 MMOL/L — SIGNIFICANT CHANGE UP (ref 98–110)
CO2 SERPL-SCNC: 19 MMOL/L — SIGNIFICANT CHANGE UP (ref 17–32)
CREAT SERPL-MCNC: 1.5 MG/DL — SIGNIFICANT CHANGE UP (ref 0.7–1.5)
CRP SERPL-MCNC: 15.3 MG/DL — HIGH (ref 0–0.4)
EOSINOPHIL # BLD AUTO: 0.03 K/UL — SIGNIFICANT CHANGE UP (ref 0–0.7)
EOSINOPHIL NFR BLD AUTO: 0.3 % — SIGNIFICANT CHANGE UP (ref 0–8)
FERRITIN SERPL-MCNC: 997 NG/ML — HIGH (ref 30–400)
GLUCOSE SERPL-MCNC: 108 MG/DL — HIGH (ref 70–99)
HCT VFR BLD CALC: 37.6 % — LOW (ref 42–52)
HGB BLD-MCNC: 12.2 G/DL — LOW (ref 14–18)
IMM GRANULOCYTES NFR BLD AUTO: 0.4 % — HIGH (ref 0.1–0.3)
LYMPHOCYTES # BLD AUTO: 1.33 K/UL — SIGNIFICANT CHANGE UP (ref 1.2–3.4)
LYMPHOCYTES # BLD AUTO: 14.8 % — LOW (ref 20.5–51.1)
MAGNESIUM SERPL-MCNC: 2.1 MG/DL — SIGNIFICANT CHANGE UP (ref 1.8–2.4)
MCHC RBC-ENTMCNC: 30 PG — SIGNIFICANT CHANGE UP (ref 27–31)
MCHC RBC-ENTMCNC: 32.4 G/DL — SIGNIFICANT CHANGE UP (ref 32–37)
MCV RBC AUTO: 92.6 FL — SIGNIFICANT CHANGE UP (ref 80–94)
MONOCYTES # BLD AUTO: 0.83 K/UL — HIGH (ref 0.1–0.6)
MONOCYTES NFR BLD AUTO: 9.3 % — SIGNIFICANT CHANGE UP (ref 1.7–9.3)
NEUTROPHILS # BLD AUTO: 6.71 K/UL — HIGH (ref 1.4–6.5)
NEUTROPHILS NFR BLD AUTO: 74.9 % — SIGNIFICANT CHANGE UP (ref 42.2–75.2)
NRBC # BLD: 0 /100 WBCS — SIGNIFICANT CHANGE UP (ref 0–0)
PLATELET # BLD AUTO: 248 K/UL — SIGNIFICANT CHANGE UP (ref 130–400)
POTASSIUM SERPL-MCNC: 4.5 MMOL/L — SIGNIFICANT CHANGE UP (ref 3.5–5)
POTASSIUM SERPL-SCNC: 4.5 MMOL/L — SIGNIFICANT CHANGE UP (ref 3.5–5)
PROT SERPL-MCNC: 6.1 G/DL — SIGNIFICANT CHANGE UP (ref 6–8)
RBC # BLD: 4.06 M/UL — LOW (ref 4.7–6.1)
RBC # FLD: 13.7 % — SIGNIFICANT CHANGE UP (ref 11.5–14.5)
SODIUM SERPL-SCNC: 139 MMOL/L — SIGNIFICANT CHANGE UP (ref 135–146)
WBC # BLD: 8.97 K/UL — SIGNIFICANT CHANGE UP (ref 4.8–10.8)
WBC # FLD AUTO: 8.97 K/UL — SIGNIFICANT CHANGE UP (ref 4.8–10.8)

## 2020-04-02 PROCEDURE — 99233 SBSQ HOSP IP/OBS HIGH 50: CPT

## 2020-04-02 RX ORDER — SODIUM CHLORIDE 9 MG/ML
1000 INJECTION INTRAMUSCULAR; INTRAVENOUS; SUBCUTANEOUS
Refills: 0 | Status: DISCONTINUED | OUTPATIENT
Start: 2020-04-02 | End: 2020-04-03

## 2020-04-02 RX ADMIN — ATORVASTATIN CALCIUM 40 MILLIGRAM(S): 80 TABLET, FILM COATED ORAL at 21:18

## 2020-04-02 RX ADMIN — Medication 325 MILLIGRAM(S): at 04:53

## 2020-04-02 RX ADMIN — Medication 325 MILLIGRAM(S): at 16:53

## 2020-04-02 RX ADMIN — HEPARIN SODIUM 5000 UNIT(S): 5000 INJECTION INTRAVENOUS; SUBCUTANEOUS at 16:52

## 2020-04-02 RX ADMIN — AZITHROMYCIN 250 MILLIGRAM(S): 500 TABLET, FILM COATED ORAL at 11:51

## 2020-04-02 RX ADMIN — MAGNESIUM OXIDE 400 MG ORAL TABLET 400 MILLIGRAM(S): 241.3 TABLET ORAL at 08:00

## 2020-04-02 RX ADMIN — MAGNESIUM OXIDE 400 MG ORAL TABLET 400 MILLIGRAM(S): 241.3 TABLET ORAL at 11:51

## 2020-04-02 RX ADMIN — Medication 25 MILLIGRAM(S): at 04:55

## 2020-04-02 RX ADMIN — MAGNESIUM OXIDE 400 MG ORAL TABLET 400 MILLIGRAM(S): 241.3 TABLET ORAL at 16:52

## 2020-04-02 RX ADMIN — HEPARIN SODIUM 5000 UNIT(S): 5000 INJECTION INTRAVENOUS; SUBCUTANEOUS at 04:54

## 2020-04-02 RX ADMIN — CLOPIDOGREL BISULFATE 75 MILLIGRAM(S): 75 TABLET, FILM COATED ORAL at 11:51

## 2020-04-02 RX ADMIN — SODIUM CHLORIDE 50 MILLILITER(S): 9 INJECTION INTRAMUSCULAR; INTRAVENOUS; SUBCUTANEOUS at 15:23

## 2020-04-02 NOTE — PROGRESS NOTE ADULT - SUBJECTIVE AND OBJECTIVE BOX
KEZIA MOYER 74y Male  MRN#: [id number]   CODE STATUS: FULL      SUBJECTIVE  Patient is a 74y old Male who presents with a chief complaint of COVID (01 Apr 2020 09:48)    Currently admitted to medicine with the primary diagnosis of SARS COV2 pneumonia    Today is hospital day 7d,   INTERVAL HPI/OVERNIGHT EVENTS: patient is still using oxygen and he is hypotensive as well.     Present Today:           Earl Catheter (x)No/ ()Yes?   Indication:             Central Line (x)No/ ()Yes?   Indication:          IV Fluids (x)No/ ()Yes? Type:  Rate:  Indication:    OBJECTIVE  PAST MEDICAL & SURGICAL HISTORY  CAD (coronary artery disease)  Kidney stones  High cholesterol  HTN (hypertension)  S/P drug eluting coronary stent placement: &gt; 10 years ago    ALLERGIES:  No Known Allergies    HOME MEDICATIONS:  Home Medications:  clopidogrel 75 mg oral tablet: 1 tab(s) orally once a day (30 Mar 2020 10:01)  FEBUXOSTAT  40 MG TABS:  (26 Mar 2020 01:49)  FUROSEMIDE  40 MG TABS:  (26 Mar 2020 01:49)  metoprolol tartrate 25 mg oral tablet: 1 tab(s) orally 2 times a day (30 Mar 2020 10:01)  PIOGLITAZONE HYDROCHLORIDE  30 MG TABS:  (26 Mar 2020 01:49)  ROSUVASTATIN CALCIUM  20 MG TABS:  (26 Mar 2020 01:48)    MEDICATIONS:  STANDING MEDICATIONS  atorvastatin 40 milliGRAM(s) Oral at bedtime  azithromycin   Tablet 250 milliGRAM(s) Oral daily  clopidogrel Tablet 75 milliGRAM(s) Oral daily  heparin  Injectable 5000 Unit(s) SubCutaneous every 12 hours  magnesium oxide 400 milliGRAM(s) Oral three times a day with meals  metoprolol tartrate 25 milliGRAM(s) Oral two times a day  sodium bicarbonate 325 milliGRAM(s) Oral two times a day    PRN MEDICATIONS  acetaminophen   Tablet .. 650 milliGRAM(s) Oral every 6 hours PRN      VITAL SIGNS: Last 24 Hours  T(C): 37.1 (02 Apr 2020 12:00), Max: 38.2 (01 Apr 2020 20:39)  T(F): 98.8 (02 Apr 2020 12:00), Max: 100.8 (01 Apr 2020 20:39)  HR: 104 (02 Apr 2020 12:00) (62 - 104)  BP: 95/71 (02 Apr 2020 12:00) (95/71 - 182/74)  RR: 20 (02 Apr 2020 12:00) (18 - 22)  SpO2: 94% (02 Apr 2020 12:00) (89% - 94%)    LABS:                        12.2   8.97  )-----------( 248      ( 02 Apr 2020 06:38 )             37.6     04-02    139  |  107  |  24<H>  ----------------------------<  108<H>  4.5   |  19  |  1.5    Ca    7.9<L>      02 Apr 2020 06:38  Mg     2.1     04-02    TPro  6.1  /  Alb  2.7<L>  /  TBili  0.9  /  DBili  x   /  AST  86<H>  /  ALT  58<H>  /  AlkPhos  108  04-02  RADIOLOGY:          ECHO:      PHYSICAL EXAM:    As per attending's note  ASSESSMENT & PLAN    75 YO M with a PMH of HTN, HLD, CAD s/p stent, and 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 Associated with SOB and subjective fevers for the past x 1 day. Denies any sore throat, runny nose, CP, palpitations, dysuria, flank pain, LE swelling, or N/V/D. + sick contact (wife with similar symptoms). Retired. No recent travel. In the ED, Chest X-Ray with extensive B/L interstitial opacities concerning for viral pneumonia (no official read). High supplemental O2 requirements. Also, noted to have KEEGAN. COVID pcr is positive. Was started on plaquenil     #Acute hypoxic respiratory failure likely SARS COV2 pneumonia with evolving cytokine release syndrome  - saturating  91% on on 4L, saturating on room air: 84%    - CXR on admission:  diffuse bilateral interstitial and airspace opacities  - CXR 4/1: worsening opacities  - COVID-19 postive; flu rsv negative  - Procalcitonin:  0.22<<0.22<0.28 CRP: 9>>9.63>>12.52  LDH: 646  D-dimer :61 fibrinogen>700; IL- 6 : 165  - Liver Biochemical Testing Trend:  - Bilirubin Direct, Serum: 0.3 (03-27-20 @ 06:39)  - Aspartate Aminotransferase (AST/SGOT): 109<<120<<119   - Alanine Aminotransferase (ALT/SGPT): 51<<47<<45 ; hold statin ; can dc home on statin  - repeat CXR: worsening opacities   - CXR: 3/29: stable opacities   - CXR 3/20: worsened B/l INTERSTITAL OPACITIES  - EKG on admission: sinus tachycardia QTC 444ms  - EKG: 3/27: sinus tachy; QTC 462ms  - EKG: 3/29: 432ms  - s/p plaquanil, started on azithro  - BNP: 3422; trop: 0.04; ordered ECHO- PENDING  - ID consulted; recs appreciated  - sent IL-6 x1, ferritin, fibrinogen, CRP, TAG q48h to monitor for signs of cytokine release syndrome    - Supportive care: tylenol  pain, anti- tussive, antiemetics  - air bone ,contact, droplet precautions  - prone positioning  - ICU consulted for evaluation    #hypotension  - likely dehydrated  - started gentle hydration  - hold metoprolol if SBP<100    #KEEGAN on 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 ~ 2.1 - resolved  - likely prerenal due to fever, decreased po intake, lasix/ Doubt ATN.  - Creatinine Trend: 1.5<--, 1.5<--, 1.9<--, 3.0<--, 3.6<--, 4.2<--  - non oliguric  - check UA, urine creatinine, NA, Urea, Pr/Cr ratio  - US renal pending  - Renal is following.   - Hold nephrotoxic agents.    # Mild HAGMA likely from elevated uremic acid levels.- resolved  - IVFs (LR). Trend BMPs  - c/w po bicarb 325 mg q 12 hr as per nephro    #Hx of HTN, HLD, and CAD s/p stent.   - Restart home meds, hold nephrotoxic agents.   - hold statin as lfts>75    ACTIVITY: Increase as tolerated ( within room)   DVT PPX: HEPARIN  GI PPX: not indicated   DIET: DASH   CODE: full   DIsposition: from home; possible dc today   Pending: ICU eval, KEZIA MOYER 74y Male  MRN#: [id number]   CODE STATUS: FULL      SUBJECTIVE  Patient is a 74y old Male who presents with a chief complaint of COVID (01 Apr 2020 09:48)    Currently admitted to medicine with the primary diagnosis of SARS COV2 pneumonia    Today is hospital day 7d,   INTERVAL HPI/OVERNIGHT EVENTS: patient is still using oxygen and he is hypotensive as well.     Present Today:           Earl Catheter (x)No/ ()Yes?   Indication:             Central Line (x)No/ ()Yes?   Indication:          IV Fluids (x)No/ ()Yes? Type:  Rate:  Indication:    OBJECTIVE  PAST MEDICAL & SURGICAL HISTORY  CAD (coronary artery disease)  Kidney stones  High cholesterol  HTN (hypertension)  S/P drug eluting coronary stent placement: &gt; 10 years ago    ALLERGIES:  No Known Allergies    HOME MEDICATIONS:  Home Medications:  clopidogrel 75 mg oral tablet: 1 tab(s) orally once a day (30 Mar 2020 10:01)  FEBUXOSTAT  40 MG TABS:  (26 Mar 2020 01:49)  FUROSEMIDE  40 MG TABS:  (26 Mar 2020 01:49)  metoprolol tartrate 25 mg oral tablet: 1 tab(s) orally 2 times a day (30 Mar 2020 10:01)  PIOGLITAZONE HYDROCHLORIDE  30 MG TABS:  (26 Mar 2020 01:49)  ROSUVASTATIN CALCIUM  20 MG TABS:  (26 Mar 2020 01:48)    MEDICATIONS:  STANDING MEDICATIONS  atorvastatin 40 milliGRAM(s) Oral at bedtime  azithromycin   Tablet 250 milliGRAM(s) Oral daily  clopidogrel Tablet 75 milliGRAM(s) Oral daily  heparin  Injectable 5000 Unit(s) SubCutaneous every 12 hours  magnesium oxide 400 milliGRAM(s) Oral three times a day with meals  metoprolol tartrate 25 milliGRAM(s) Oral two times a day  sodium bicarbonate 325 milliGRAM(s) Oral two times a day    PRN MEDICATIONS  acetaminophen   Tablet .. 650 milliGRAM(s) Oral every 6 hours PRN      VITAL SIGNS: Last 24 Hours  T(C): 37.1 (02 Apr 2020 12:00), Max: 38.2 (01 Apr 2020 20:39)  T(F): 98.8 (02 Apr 2020 12:00), Max: 100.8 (01 Apr 2020 20:39)  HR: 104 (02 Apr 2020 12:00) (62 - 104)  BP: 95/71 (02 Apr 2020 12:00) (95/71 - 182/74)  RR: 20 (02 Apr 2020 12:00) (18 - 22)  SpO2: 94% (02 Apr 2020 12:00) (89% - 94%)    LABS:                        12.2   8.97  )-----------( 248      ( 02 Apr 2020 06:38 )             37.6     04-02    139  |  107  |  24<H>  ----------------------------<  108<H>  4.5   |  19  |  1.5    Ca    7.9<L>      02 Apr 2020 06:38  Mg     2.1     04-02    TPro  6.1  /  Alb  2.7<L>  /  TBili  0.9  /  DBili  x   /  AST  86<H>  /  ALT  58<H>  /  AlkPhos  108  04-02  RADIOLOGY:          ECHO:      PHYSICAL EXAM:    As per attending's note  ASSESSMENT & PLAN    75 YO M with a PMH of HTN, HLD, CAD s/p stent, and 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 Associated with SOB and subjective fevers for the past x 1 day. Denies any sore throat, runny nose, CP, palpitations, dysuria, flank pain, LE swelling, or N/V/D. + sick contact (wife with similar symptoms). Retired. No recent travel. In the ED, Chest X-Ray with extensive B/L interstitial opacities concerning for viral pneumonia (no official read). High supplemental O2 requirements. Also, noted to have KEEGAN. COVID pcr is positive. Was started on plaquenil     #Acute hypoxic respiratory failure likely SARS COV2 pneumonia with evolving cytokine release syndrome  - saturating  91% on on 4L, saturating on room air: 84%    - CXR on admission:  diffuse bilateral interstitial and airspace opacities  - CXR 4/1: worsening opacities  - COVID-19 postive; flu rsv negative  - Procalcitonin:  0.22<<0.22<0.28 CRP: 9>>9.63>>12.52  LDH: 646  D-dimer :61 fibrinogen>700; IL- 6 : 165  - Liver Biochemical Testing Trend:  - Bilirubin Direct, Serum: 0.3 (03-27-20 @ 06:39)  - Aspartate Aminotransferase (AST/SGOT): 109<<120<<119   - Alanine Aminotransferase (ALT/SGPT): 51<<47<<45 ; hold statin ; can dc home on statin  - repeat CXR: worsening opacities   - CXR: 3/29: stable opacities   - CXR 3/20: worsened B/l INTERSTITAL OPACITIES  - EKG on admission: sinus tachycardia QTC 444ms  - EKG: 3/27: sinus tachy; QTC 462ms  - EKG: 3/29: 432ms  - s/p plaquanil, started on azithro  - BNP: 3422; trop: 0.04; ordered ECHO- PENDING  - ID consulted; recs appreciated  - sent IL-6 x1, ferritin, fibrinogen, CRP, TAG q48h to monitor for signs of cytokine release syndrome    - Supportive care: tylenol  pain, anti- tussive, antiemetics  - air bone ,contact, droplet precautions  - prone positioning  - ICU consulted for evaluation    #hypotension  - likely dehydrated  - started gentle hydration  - hold metoprolol if SBP<100    #KEEGAN on 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 ~ 2.1 - resolved  - likely prerenal due to fever, decreased po intake, lasix/ Doubt ATN.  - Creatinine Trend: 1.5<--, 1.5<--, 1.9<--, 3.0<--, 3.6<--, 4.2<--  - non oliguric  - check UA, urine creatinine, NA, Urea, Pr/Cr ratio  - US renal pending  - Renal is following.   - Hold nephrotoxic agents.    # Mild HAGMA likely from elevated uremic acid levels.- resolved  - IVFs (LR). Trend BMPs  - c/w po bicarb 325 mg q 12 hr as per nephro    #Hx of HTN, HLD, and CAD s/p stent.   - Restart home meds, hold nephrotoxic agents.   - hold statin as lfts>75    ACTIVITY: Increase as tolerated ( within room)   DVT PPX: HEPARIN  GI PPX: not indicated   DIET: DASH   CODE: full   DIsposition: from home;  Pending: ICU eval,   SPoke to sajan Donaldson [phone] KEZIA MOYER 74y Male  MRN#: [id number]   CODE STATUS: FULL      SUBJECTIVE  Patient is a 74y old Male who presents with a chief complaint of COVID (01 Apr 2020 09:48)    Currently admitted to medicine with the primary diagnosis of SARS COV2 pneumonia    Today is hospital day 7d,   INTERVAL HPI/OVERNIGHT EVENTS: patient is still using oxygen and he is hypotensive as well.     Present Today:           Earl Catheter (x)No/ ()Yes?   Indication:             Central Line (x)No/ ()Yes?   Indication:          IV Fluids (x)No/ ()Yes? Type:  Rate:  Indication:    OBJECTIVE  PAST MEDICAL & SURGICAL HISTORY  CAD (coronary artery disease)  Kidney stones  High cholesterol  HTN (hypertension)  S/P drug eluting coronary stent placement: &gt; 10 years ago    ALLERGIES:  No Known Allergies    HOME MEDICATIONS:  Home Medications:  clopidogrel 75 mg oral tablet: 1 tab(s) orally once a day (30 Mar 2020 10:01)  FEBUXOSTAT  40 MG TABS:  (26 Mar 2020 01:49)  FUROSEMIDE  40 MG TABS:  (26 Mar 2020 01:49)  metoprolol tartrate 25 mg oral tablet: 1 tab(s) orally 2 times a day (30 Mar 2020 10:01)  PIOGLITAZONE HYDROCHLORIDE  30 MG TABS:  (26 Mar 2020 01:49)  ROSUVASTATIN CALCIUM  20 MG TABS:  (26 Mar 2020 01:48)    MEDICATIONS:  STANDING MEDICATIONS  atorvastatin 40 milliGRAM(s) Oral at bedtime  azithromycin   Tablet 250 milliGRAM(s) Oral daily  clopidogrel Tablet 75 milliGRAM(s) Oral daily  heparin  Injectable 5000 Unit(s) SubCutaneous every 12 hours  magnesium oxide 400 milliGRAM(s) Oral three times a day with meals  metoprolol tartrate 25 milliGRAM(s) Oral two times a day  sodium bicarbonate 325 milliGRAM(s) Oral two times a day    PRN MEDICATIONS  acetaminophen   Tablet .. 650 milliGRAM(s) Oral every 6 hours PRN      VITAL SIGNS: Last 24 Hours  T(C): 37.1 (02 Apr 2020 12:00), Max: 38.2 (01 Apr 2020 20:39)  T(F): 98.8 (02 Apr 2020 12:00), Max: 100.8 (01 Apr 2020 20:39)  HR: 104 (02 Apr 2020 12:00) (62 - 104)  BP: 95/71 (02 Apr 2020 12:00) (95/71 - 182/74)  RR: 20 (02 Apr 2020 12:00) (18 - 22)  SpO2: 94% (02 Apr 2020 12:00) (89% - 94%)    LABS:                        12.2   8.97  )-----------( 248      ( 02 Apr 2020 06:38 )             37.6     04-02    139  |  107  |  24<H>  ----------------------------<  108<H>  4.5   |  19  |  1.5    Ca    7.9<L>      02 Apr 2020 06:38  Mg     2.1     04-02    TPro  6.1  /  Alb  2.7<L>  /  TBili  0.9  /  DBili  x   /  AST  86<H>  /  ALT  58<H>  /  AlkPhos  108  04-02  RADIOLOGY:          ECHO:      PHYSICAL EXAM:    As per attending's note  ASSESSMENT & PLAN    73 YO M with a PMH of HTN, HLD, CAD s/p stent, and 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 Associated with SOB and subjective fevers for the past x 1 day. Denies any sore throat, runny nose, CP, palpitations, dysuria, flank pain, LE swelling, or N/V/D. + sick contact (wife with similar symptoms). Retired. No recent travel. In the ED, Chest X-Ray with extensive B/L interstitial opacities concerning for viral pneumonia (no official read). High supplemental O2 requirements. Also, noted to have KEEGAN. COVID pcr is positive. Was started on plaquenil     #Acute hypoxic respiratory failure likely SARS COV2 pneumonia with evolving cytokine release syndrome  - saturating  91% on on 4L, saturating on room air: 84%    - CXR on admission:  diffuse bilateral interstitial and airspace opacities  - CXR 4/1: worsening opacities  - COVID-19 postive; flu rsv negative  - Procalcitonin:  0.22<<0.22<0.28 CRP: 9>>9.63>>12.52  LDH: 646  D-dimer :61 fibrinogen>700; IL- 6 : 165  - Liver Biochemical Testing Trend:  - Bilirubin Direct, Serum: 0.3 (03-27-20 @ 06:39)  - Aspartate Aminotransferase (AST/SGOT): 109<<120<<119   - Alanine Aminotransferase (ALT/SGPT): 51<<47<<45 ;statin continued  - repeat CXR: worsening opacities   - CXR: 3/29: stable opacities   - CXR 3/20: worsened B/l INTERSTITAL OPACITIES  - EKG on admission: sinus tachycardia QTC 444ms  - EKG: 3/27: sinus tachy; QTC 462ms  - EKG: 3/29: 432ms  - s/p plaquanil, started on azithro  - BNP: 3422; trop: 0.04; ordered ECHO- PENDING  - ID consulted; recs appreciated  - sent IL-6 x1, ferritin, fibrinogen, CRP, TAG q48h to monitor for signs of cytokine release syndrome    - Supportive care:  anti- tussive, antiemetics  - air bone ,contact, droplet precautions  - prone positioning  - ICU consulted for evaluation    #hypotension  - likely dehydrated  - started gentle hydration  - hold metoprolol if SBP<100    #KEEGAN on 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 ~ 2.1 - resolved  - likely prerenal due to fever, decreased po intake, lasix/ Doubt ATN.  - Creatinine Trend: 1.5<--, 1.5<--, 1.9<--, 3.0<--, 3.6<--, 4.2<--  - non oliguric  - check UA, urine creatinine, NA, Urea, Pr/Cr ratio  - US renal pending  - Renal is following.   - Hold nephrotoxic agents.    # Mild HAGMA likely from elevated uremic acid levels.- resolved  - IVFs (LR). Trend BMPs  - c/w po bicarb 325 mg q 12 hr as per nephro    #Hx of HTN, HLD, and CAD s/p stent.   - Restart home meds, hold nephrotoxic agents.   - hold statin as lfts>75    ACTIVITY: Increase as tolerated ( within room)   DVT PPX: HEPARIN  GI PPX: not indicated   DIET: DASH   CODE: full   DIsposition: from home;  Pending: ICU eval,   SPoke to sajan Donaldson [phone]

## 2020-04-02 NOTE — DIETITIAN INITIAL EVALUATION ADULT. - RD TO REMAIN AVAILABLE
Interventions: meals and snacks, medical food supplement. Monitor/Evaluate: RD to monitor energy intake, diet order body comp, NFPF./yes

## 2020-04-02 NOTE — DIETITIAN INITIAL EVALUATION ADULT. - OTHER INFO
Pertinent Medical Information: Acute hypoxic respiratory failure likely SARS COV2 pneumonia with evolving cytokine release syndrome- saturating 91% on on 4L. Pt is COVID+. ECHO pending for elevated BNP. ICU consulted for evaluation. Hypotension- likely dehydrated. KEEGAN on 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~ 2.1 - resolved, likely prerenal due to fever, decreased po intake, lasix/ Doubt ATN. Renal following. Mild HAGMA likely from elevated uremic acid levels.- resolved.    Pertinent Subjective Information- Unable to contact pt; RD called wife who report poor appetite since admission with documented po intake of 25%-50% in EMR, pt had good appetite PTA, consuming 3 meals+ snacks. No supplement use PTA.  Wife uncertain of any recent changes in wt. NKFA. Pt prefers kosher foods. Pt followed regular diet PTA. Wife agreed to try Ensure Enlive BID.

## 2020-04-02 NOTE — DIETITIAN INITIAL EVALUATION ADULT. - PHYSICAL APPEARANCE
BMI 28.2 (using ht of 67" and wt of 180lbs stated by wife); alert & oriented x4, no edema, no chewing/swallowing difficulties noted; no GI issues; LBM-3/28; skin- ecchymosis/overweight

## 2020-04-02 NOTE — PROVIDER CONTACT NOTE (OTHER) - SITUATION
MD Chacon said okay to give plaquanel went over EKG with doctor no intervention for bp pt is asymptomatic.
MD Avendaño said that it was okay pt has midnight dose of drug ekg is okay and up to date
Pts BP elevated on arrival, O2 sat 93% on 4L NC
pt /72, HR 62. pt just had metoprolol 30 minutes before taken BP.
pt's /70, pt doesn't have BP meds due at this time. pt SatO2 89%-90% on 3L NC, pt denied SOB or distress, HOB elevated.

## 2020-04-02 NOTE — DIETITIAN INITIAL EVALUATION ADULT. - ENERGY NEEDS
Calories: 9080-8964 kcal/day (MSJ 1.2-1.3 AF)  Protein: 82-98 g/day (1-1.2 g/kg)  Fluid: 1 ml/kcal or per LIP

## 2020-04-02 NOTE — PROVIDER CONTACT NOTE (OTHER) - DATE AND TIME:
31-Mar-2020 06:11
01-Apr-2020 16:01
02-Apr-2020 02:08
02-Apr-2020 05:49
26-Mar-2020 19:00
27-Mar-2020 01:28

## 2020-04-02 NOTE — PROVIDER CONTACT NOTE (OTHER) - REASON
Elevated BP & decreased O2 Sat
Patient insisting that his private MD says that he can be off O2 even saturating at 88%
high BP
high BP and low SatO2
pt received plaquenil wanted to clarify if pt needed another ekg
pt bp 172/73 received bp meds due for plaquanel

## 2020-04-02 NOTE — PROVIDER CONTACT NOTE (OTHER) - ACTION/TREATMENT ORDERED:
patient can stay off O2 if that's what he wants. If desaturation occurs upon ambulation, patient will not be able to go home.
MD Galindo advised to keep monitor BP, no intervention at this time. MD Cobian advised to adjust pt to 4L NC, maintained SatO2 on or above 90%.
MD Galindo advised to take BP 1 hr after given metoprolol.
Increase NC to 5L and pharmacologic intervention will be ordered for BP. No additional action at this time

## 2020-04-03 LAB
ALBUMIN SERPL ELPH-MCNC: 2.4 G/DL — LOW (ref 3.5–5.2)
ALP SERPL-CCNC: 109 U/L — SIGNIFICANT CHANGE UP (ref 30–115)
ALT FLD-CCNC: 60 U/L — HIGH (ref 0–41)
ANION GAP SERPL CALC-SCNC: 13 MMOL/L — SIGNIFICANT CHANGE UP (ref 7–14)
AST SERPL-CCNC: 83 U/L — HIGH (ref 0–41)
BASOPHILS # BLD AUTO: 0.01 K/UL — SIGNIFICANT CHANGE UP (ref 0–0.2)
BASOPHILS NFR BLD AUTO: 0.1 % — SIGNIFICANT CHANGE UP (ref 0–1)
BILIRUB SERPL-MCNC: 0.9 MG/DL — SIGNIFICANT CHANGE UP (ref 0.2–1.2)
BUN SERPL-MCNC: 28 MG/DL — HIGH (ref 10–20)
CALCIUM SERPL-MCNC: 7.6 MG/DL — LOW (ref 8.5–10.1)
CHLORIDE SERPL-SCNC: 104 MMOL/L — SIGNIFICANT CHANGE UP (ref 98–110)
CHOLEST SERPL-MCNC: 64 MG/DL — LOW (ref 100–200)
CO2 SERPL-SCNC: 23 MMOL/L — SIGNIFICANT CHANGE UP (ref 17–32)
CREAT SERPL-MCNC: 1.6 MG/DL — HIGH (ref 0.7–1.5)
CRP SERPL-MCNC: 14.57 MG/DL — HIGH (ref 0–0.4)
D DIMER BLD IA.RAPID-MCNC: 4079 NG/ML DDU — HIGH (ref 0–230)
EOSINOPHIL # BLD AUTO: 0.01 K/UL — SIGNIFICANT CHANGE UP (ref 0–0.7)
EOSINOPHIL NFR BLD AUTO: 0.1 % — SIGNIFICANT CHANGE UP (ref 0–8)
FIBRINOGEN PPP-MCNC: 674 MG/DL — HIGH (ref 204.4–570.6)
GLUCOSE SERPL-MCNC: 105 MG/DL — HIGH (ref 70–99)
HCT VFR BLD CALC: 33 % — LOW (ref 42–52)
HDLC SERPL-MCNC: 24 MG/DL — LOW
HGB BLD-MCNC: 10.6 G/DL — LOW (ref 14–18)
IL6 FLD-MCNC: 183.4 PG/ML — HIGH (ref 0–15.5)
IMM GRANULOCYTES NFR BLD AUTO: 0.9 % — HIGH (ref 0.1–0.3)
LIPID PNL WITH DIRECT LDL SERPL: 22 MG/DL — SIGNIFICANT CHANGE UP (ref 4–129)
LYMPHOCYTES # BLD AUTO: 1.24 K/UL — SIGNIFICANT CHANGE UP (ref 1.2–3.4)
LYMPHOCYTES # BLD AUTO: 13.5 % — LOW (ref 20.5–51.1)
MAGNESIUM SERPL-MCNC: 2.2 MG/DL — SIGNIFICANT CHANGE UP (ref 1.8–2.4)
MCHC RBC-ENTMCNC: 29.4 PG — SIGNIFICANT CHANGE UP (ref 27–31)
MCHC RBC-ENTMCNC: 32.1 G/DL — SIGNIFICANT CHANGE UP (ref 32–37)
MCV RBC AUTO: 91.4 FL — SIGNIFICANT CHANGE UP (ref 80–94)
MONOCYTES # BLD AUTO: 0.82 K/UL — HIGH (ref 0.1–0.6)
MONOCYTES NFR BLD AUTO: 8.9 % — SIGNIFICANT CHANGE UP (ref 1.7–9.3)
NEUTROPHILS # BLD AUTO: 7.03 K/UL — HIGH (ref 1.4–6.5)
NEUTROPHILS NFR BLD AUTO: 76.5 % — HIGH (ref 42.2–75.2)
NRBC # BLD: 0 /100 WBCS — SIGNIFICANT CHANGE UP (ref 0–0)
PLATELET # BLD AUTO: 261 K/UL — SIGNIFICANT CHANGE UP (ref 130–400)
POTASSIUM SERPL-MCNC: 4.6 MMOL/L — SIGNIFICANT CHANGE UP (ref 3.5–5)
POTASSIUM SERPL-SCNC: 4.6 MMOL/L — SIGNIFICANT CHANGE UP (ref 3.5–5)
PROCALCITONIN SERPL-MCNC: 0.29 NG/ML — HIGH (ref 0.02–0.1)
PROT SERPL-MCNC: 5.7 G/DL — LOW (ref 6–8)
RBC # BLD: 3.61 M/UL — LOW (ref 4.7–6.1)
RBC # FLD: 13.9 % — SIGNIFICANT CHANGE UP (ref 11.5–14.5)
SODIUM SERPL-SCNC: 140 MMOL/L — SIGNIFICANT CHANGE UP (ref 135–146)
TOTAL CHOLESTEROL/HDL RATIO MEASUREMENT: 2.7 RATIO — LOW (ref 4–5.5)
TRIGL SERPL-MCNC: 88 MG/DL — SIGNIFICANT CHANGE UP (ref 10–149)
WBC # BLD: 9.19 K/UL — SIGNIFICANT CHANGE UP (ref 4.8–10.8)
WBC # FLD AUTO: 9.19 K/UL — SIGNIFICANT CHANGE UP (ref 4.8–10.8)

## 2020-04-03 PROCEDURE — 71045 X-RAY EXAM CHEST 1 VIEW: CPT | Mod: 26

## 2020-04-03 PROCEDURE — 99233 SBSQ HOSP IP/OBS HIGH 50: CPT

## 2020-04-03 RX ORDER — ROCURONIUM BROMIDE 10 MG/ML
50 VIAL (ML) INTRAVENOUS ONCE
Refills: 0 | Status: DISCONTINUED | OUTPATIENT
Start: 2020-04-03 | End: 2020-04-03

## 2020-04-03 RX ORDER — ANAKINRA 100MG/0.67
100 SYRINGE (ML) SUBCUTANEOUS
Refills: 0 | Status: COMPLETED | OUTPATIENT
Start: 2020-04-03 | End: 2020-04-06

## 2020-04-03 RX ORDER — VANCOMYCIN HCL 1 G
VIAL (EA) INTRAVENOUS
Refills: 0 | Status: DISCONTINUED | OUTPATIENT
Start: 2020-04-03 | End: 2020-04-04

## 2020-04-03 RX ORDER — MEROPENEM 1 G/30ML
1000 INJECTION INTRAVENOUS EVERY 12 HOURS
Refills: 0 | Status: DISCONTINUED | OUTPATIENT
Start: 2020-04-03 | End: 2020-04-04

## 2020-04-03 RX ORDER — PROPOFOL 10 MG/ML
0.01 INJECTION, EMULSION INTRAVENOUS
Qty: 500 | Refills: 0 | Status: DISCONTINUED | OUTPATIENT
Start: 2020-04-03 | End: 2020-04-03

## 2020-04-03 RX ORDER — VANCOMYCIN HCL 1 G
1000 VIAL (EA) INTRAVENOUS EVERY 24 HOURS
Refills: 0 | Status: DISCONTINUED | OUTPATIENT
Start: 2020-04-04 | End: 2020-04-04

## 2020-04-03 RX ORDER — VANCOMYCIN HCL 1 G
1500 VIAL (EA) INTRAVENOUS ONCE
Refills: 0 | Status: COMPLETED | OUTPATIENT
Start: 2020-04-03 | End: 2020-04-03

## 2020-04-03 RX ORDER — NOREPINEPHRINE BITARTRATE/D5W 8 MG/250ML
0.05 PLASTIC BAG, INJECTION (ML) INTRAVENOUS
Qty: 8 | Refills: 0 | Status: DISCONTINUED | OUTPATIENT
Start: 2020-04-03 | End: 2020-04-03

## 2020-04-03 RX ORDER — MORPHINE SULFATE 50 MG/1
0.1 CAPSULE, EXTENDED RELEASE ORAL
Qty: 100 | Refills: 0 | Status: DISCONTINUED | OUTPATIENT
Start: 2020-04-03 | End: 2020-04-03

## 2020-04-03 RX ADMIN — Medication 300 MILLIGRAM(S): at 17:17

## 2020-04-03 RX ADMIN — MAGNESIUM OXIDE 400 MG ORAL TABLET 400 MILLIGRAM(S): 241.3 TABLET ORAL at 17:18

## 2020-04-03 RX ADMIN — Medication 100 MILLIGRAM(S): at 17:18

## 2020-04-03 RX ADMIN — ATORVASTATIN CALCIUM 40 MILLIGRAM(S): 80 TABLET, FILM COATED ORAL at 22:06

## 2020-04-03 RX ADMIN — AZITHROMYCIN 250 MILLIGRAM(S): 500 TABLET, FILM COATED ORAL at 10:46

## 2020-04-03 RX ADMIN — HEPARIN SODIUM 5000 UNIT(S): 5000 INJECTION INTRAVENOUS; SUBCUTANEOUS at 17:18

## 2020-04-03 RX ADMIN — MEROPENEM 100 MILLIGRAM(S): 1 INJECTION INTRAVENOUS at 17:17

## 2020-04-03 RX ADMIN — HEPARIN SODIUM 5000 UNIT(S): 5000 INJECTION INTRAVENOUS; SUBCUTANEOUS at 05:08

## 2020-04-03 RX ADMIN — CLOPIDOGREL BISULFATE 75 MILLIGRAM(S): 75 TABLET, FILM COATED ORAL at 10:46

## 2020-04-03 RX ADMIN — Medication 325 MILLIGRAM(S): at 17:17

## 2020-04-03 RX ADMIN — Medication 325 MILLIGRAM(S): at 05:08

## 2020-04-03 RX ADMIN — MAGNESIUM OXIDE 400 MG ORAL TABLET 400 MILLIGRAM(S): 241.3 TABLET ORAL at 10:46

## 2020-04-03 RX ADMIN — Medication 60 MILLIGRAM(S): at 17:17

## 2020-04-03 RX ADMIN — MAGNESIUM OXIDE 400 MG ORAL TABLET 400 MILLIGRAM(S): 241.3 TABLET ORAL at 08:32

## 2020-04-03 NOTE — PROGRESS NOTE ADULT - SUBJECTIVE AND OBJECTIVE BOX
KEZIA MOYER  74y, Male  Allergy: No Known Allergies      LOS  8d    CHIEF COMPLAINT: SOB (03 Apr 2020 08:13)      INTERVAL EVENTS/HPI  - NRB, CXR continued diffuse opacities  - T(F): , Max: 99.7 (04-02-20 @ 20:30)  - WBC Count: 9.19 (04-03-20 @ 06:48)  WBC Count: 8.97 (04-02-20 @ 06:38)  - Creatinine, Serum: 1.6 (04-03-20 @ 06:48)  Creatinine, Serum: 1.5 (04-02-20 @ 06:38)     ==    VITALS:  T(F): 99.6, Max: 99.7 (04-02-20 @ 20:30)  HR: 83  BP: 103/53  RR: 26Vital Signs Last 24 Hrs  T(C): 37.6 (03 Apr 2020 10:37), Max: 37.6 (02 Apr 2020 20:30)  T(F): 99.6 (03 Apr 2020 10:37), Max: 99.7 (02 Apr 2020 20:30)  HR: 83 (03 Apr 2020 10:37) (56 - 104)  BP: 103/53 (03 Apr 2020 10:37) (95/71 - 173/76)  BP(mean): --  RR: 26 (03 Apr 2020 08:30) (20 - 26)  SpO2: 94% (03 Apr 2020 10:37) (91% - 97%)    PHYSICAL EXAM:  Gen: on NRB  HEENT: NCAT  Resp: b/l chest expansion  Neuro: nonfocal     FH: Non-contributory  Social Hx: Non-contributory    TESTS & MEASUREMENTS:                        10.6   9.19  )-----------( 261      ( 03 Apr 2020 06:48 )             33.0     04-03    140  |  104  |  28<H>  ----------------------------<  105<H>  4.6   |  23  |  1.6<H>    Ca    7.6<L>      03 Apr 2020 06:48  Mg     2.2     04-03    TPro  5.7<L>  /  Alb  2.4<L>  /  TBili  0.9  /  DBili  x   /  AST  83<H>  /  ALT  60<H>  /  AlkPhos  109  04-03    eGFR if Non African American: 42 mL/min/1.73M2 (04-03-20 @ 06:48)  eGFR if : 49 mL/min/1.73M2 (04-03-20 @ 06:48)    LIVER FUNCTIONS - ( 03 Apr 2020 06:48 )  Alb: 2.4 g/dL / Pro: 5.7 g/dL / ALK PHOS: 109 U/L / ALT: 60 U/L / AST: 83 U/L / GGT: x                     INFECTIOUS DISEASES TESTING  Procalcitonin, Serum: 0.22 (04-01-20 @ 06:25)  Procalcitonin, Serum: 0.22 (03-31-20 @ 04:30)  Procalcitonin, Serum: 0.28 (03-26-20 @ 06:10)  COVID-19 PCR: Detected (03-25-20 @ 23:27)  Flu B Result: Negative (03-25-20 @ 23:16)  RSV Result: Negative (03-25-20 @ 23:16)  Flu A Result: Negative (03-25-20 @ 23:16)      INFLAMMATORY MARKERS  C-Reactive Protein, Serum: 15.30 mg/dL (04-02-20 @ 06:38)  C-Reactive Protein, Serum: 12.15 mg/dL (04-01-20 @ 06:25)  C-Reactive Protein, Serum: 9.69 mg/dL (03-29-20 @ 07:35)  C-Reactive Protein, Serum: 9.03 mg/dL (03-26-20 @ 06:10)      RADIOLOGY & ADDITIONAL TESTS:  I have personally reviewed the last available Chest xray  CXR      CT      CARDIOLOGY TESTING  12 Lead ECG:   Ventricular Rate 69 BPM    Atrial Rate 69 BPM    P-R Interval 152 ms    QRS Duration 76 ms    Q-T Interval 434 ms    QTC Calculation(Bezet) 465 ms    P Axis 49 degrees    R Axis 56 degrees    T Axis 74 degrees    Diagnosis Line Normal sinus rhythm  Possible Left atrial enlargement  Nonspecific T wave abnormality  Prolonged QT  Abnormal ECG    Confirmed by Scooter Ortega ([id number]) on 4/2/2020 12:05:08 AM (04-01-20 @ 21:37)  12 Lead ECG:   Ventricular Rate 65 BPM    Atrial Rate 65 BPM    P-R Interval 166 ms    QRS Duration 76 ms    Q-T Interval 480 ms    QTC Calculation(Bezet) 499 ms    P Axis 43 degrees    R Axis 56 degrees    T Axis 69 degrees    Diagnosis Line Sinus rhythm with Premature atrial complexes  Prolonged QT  Abnormal ECG    Confirmed by Scooter Ortega [id number]) on 3/31/2020 7:30:31 PM (03-31-20 @ 17:41)      MEDICATIONS  atorvastatin 40  azithromycin   Tablet 250  clopidogrel Tablet 75  heparin  Injectable 5000  magnesium oxide 400  sodium bicarbonate 325      WEIGHT  Weight (kg): 97.8 (04-02-20 @ 14:43)  Creatinine, Serum: 1.6 mg/dL (04-03-20 @ 06:48)      ANTIBIOTICS:  azithromycin   Tablet 250 milliGRAM(s) Oral daily      All available historical records have been reviewed KEZIA MOYER  74y, Male  Allergy: No Known Allergies      LOS  8d    CHIEF COMPLAINT: SOB (03 Apr 2020 08:13)      INTERVAL EVENTS/HPI  - NRB, CXR continued diffuse opacities, speaking in full sentences  - T(F): , Max: 99.7 (04-02-20 @ 20:30)  - WBC Count: 9.19 (04-03-20 @ 06:48)  WBC Count: 8.97 (04-02-20 @ 06:38)  - Creatinine, Serum: 1.6 (04-03-20 @ 06:48)  Creatinine, Serum: 1.5 (04-02-20 @ 06:38)     ==    VITALS:  T(F): 99.6, Max: 99.7 (04-02-20 @ 20:30)  HR: 83  BP: 103/53  RR: 26Vital Signs Last 24 Hrs  T(C): 37.6 (03 Apr 2020 10:37), Max: 37.6 (02 Apr 2020 20:30)  T(F): 99.6 (03 Apr 2020 10:37), Max: 99.7 (02 Apr 2020 20:30)  HR: 83 (03 Apr 2020 10:37) (56 - 104)  BP: 103/53 (03 Apr 2020 10:37) (95/71 - 173/76)  BP(mean): --  RR: 26 (03 Apr 2020 08:30) (20 - 26)  SpO2: 94% (03 Apr 2020 10:37) (91% - 97%)    PHYSICAL EXAM:  Gen: NAD on NRB  HEENT: NCAT  Resp: b/l chest expansion  Neuro: nonfocal     FH: Non-contributory  Social Hx: Non-contributory    TESTS & MEASUREMENTS:                        10.6   9.19  )-----------( 261      ( 03 Apr 2020 06:48 )             33.0     04-03    140  |  104  |  28<H>  ----------------------------<  105<H>  4.6   |  23  |  1.6<H>    Ca    7.6<L>      03 Apr 2020 06:48  Mg     2.2     04-03    TPro  5.7<L>  /  Alb  2.4<L>  /  TBili  0.9  /  DBili  x   /  AST  83<H>  /  ALT  60<H>  /  AlkPhos  109  04-03    eGFR if Non African American: 42 mL/min/1.73M2 (04-03-20 @ 06:48)  eGFR if : 49 mL/min/1.73M2 (04-03-20 @ 06:48)    LIVER FUNCTIONS - ( 03 Apr 2020 06:48 )  Alb: 2.4 g/dL / Pro: 5.7 g/dL / ALK PHOS: 109 U/L / ALT: 60 U/L / AST: 83 U/L / GGT: x                     INFECTIOUS DISEASES TESTING  Procalcitonin, Serum: 0.22 (04-01-20 @ 06:25)  Procalcitonin, Serum: 0.22 (03-31-20 @ 04:30)  Procalcitonin, Serum: 0.28 (03-26-20 @ 06:10)  COVID-19 PCR: Detected (03-25-20 @ 23:27)  Flu B Result: Negative (03-25-20 @ 23:16)  RSV Result: Negative (03-25-20 @ 23:16)  Flu A Result: Negative (03-25-20 @ 23:16)      INFLAMMATORY MARKERS  C-Reactive Protein, Serum: 15.30 mg/dL (04-02-20 @ 06:38)  C-Reactive Protein, Serum: 12.15 mg/dL (04-01-20 @ 06:25)  C-Reactive Protein, Serum: 9.69 mg/dL (03-29-20 @ 07:35)  C-Reactive Protein, Serum: 9.03 mg/dL (03-26-20 @ 06:10)      RADIOLOGY & ADDITIONAL TESTS:  I have personally reviewed the last available Chest xray  CXR      CT      CARDIOLOGY TESTING  12 Lead ECG:   Ventricular Rate 69 BPM    Atrial Rate 69 BPM    P-R Interval 152 ms    QRS Duration 76 ms    Q-T Interval 434 ms    QTC Calculation(Bezet) 465 ms    P Axis 49 degrees    R Axis 56 degrees    T Axis 74 degrees    Diagnosis Line Normal sinus rhythm  Possible Left atrial enlargement  Nonspecific T wave abnormality  Prolonged QT  Abnormal ECG    Confirmed by Scooter Ortega ([id number]) on 4/2/2020 12:05:08 AM (04-01-20 @ 21:37)  12 Lead ECG:   Ventricular Rate 65 BPM    Atrial Rate 65 BPM    P-R Interval 166 ms    QRS Duration 76 ms    Q-T Interval 480 ms    QTC Calculation(Bezet) 499 ms    P Axis 43 degrees    R Axis 56 degrees    T Axis 69 degrees    Diagnosis Line Sinus rhythm with Premature atrial complexes  Prolonged QT  Abnormal ECG    Confirmed by Scooter Ortega ([id number]) on 3/31/2020 7:30:31 PM (03-31-20 @ 17:41)      MEDICATIONS  atorvastatin 40  azithromycin   Tablet 250  clopidogrel Tablet 75  heparin  Injectable 5000  magnesium oxide 400  sodium bicarbonate 325      WEIGHT  Weight (kg): 97.8 (04-02-20 @ 14:43)  Creatinine, Serum: 1.6 mg/dL (04-03-20 @ 06:48)      ANTIBIOTICS:  azithromycin   Tablet 250 milliGRAM(s) Oral daily      All available historical records have been reviewed

## 2020-04-03 NOTE — CONSULT NOTE ADULT - ASSESSMENT
Assessment:    SOB secondary to SARS-COV-2  Bilateral pulmonary Infiltrates secondary to SARS-COV-2      PLAN:    CNS: avoid sedation    HEENT:  Oral care    PULMONARY:  HOB @ 45 degrees, keep Oxygen saturation 92-96%  Wean off oxygen         CARDIOVASCULAR: Check NT-probnp, Check QTC. I=O    GI: GI prophylaxis Protonix                                         Feeding: as toelrated    RENAL:  F/u  lytes.  Correct as needed. accurate I/O    INFECTIOUS DISEASE:  repeat Procalcitonin, finish abx course.    HEMATOLOGICAL:  DVT prophylaxis.    ENDOCRINE:  Follow up FS.  Insulin protocol if needed.    MUSCULOSKELETAL: Bed rest for now    CODE STATUS: FULL CODE    DISPOSITION: no need for MICU at this point recall if worsening respiratory status.  Attending attestation to follow Assessment:    SOB secondary to SARS-COV-2(Patient prefer to hold off on intubation)  Bilateral pulmonary Infiltrates secondary to SARS-COV-2  very low threshold for intubation    PLAN:    CNS: avoid sedation for now    HEENT:  Oral care    PULMONARY:  HOB @ 45 degrees, keep Oxygen saturation 92-96%  keep on NRM, frequent monitoring(q1hr) since the patient is at high risk for deterioration. Solumedrol 60q12         CARDIOVASCULAR: Check NT-probnp, Check QTC. I=O    GI: GI prophylaxis Protonix                                         Feeding: as tolerated    RENAL:  F/u  lytes.  Correct as needed. accurate I/O    INFECTIOUS DISEASE:  repeat Procalcitonin. start cassi/vanc, check nasal MRSA    HEMATOLOGICAL:  DVT prophylaxis.    ENDOCRINE:  Follow up FS.  Insulin protocol if needed.    MUSCULOSKELETAL: Bed rest for now    CODE STATUS: FULL CODE    DISPOSITION: Upgrade to MICU  Case discussed with dr Morin, who examined the patient at the bedside. patient is aware of the risks of delaying intubation.

## 2020-04-03 NOTE — CHART NOTE - NSCHARTNOTEFT_GEN_A_CORE
Event monitor placed today and instructions provided.  Please re-call EP when monitoring is no longer indicated or patient is being discharged.    EP spectra [phone]

## 2020-04-03 NOTE — CHART NOTE - NSCHARTNOTEFT_GEN_A_CORE
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 Associated with SOB and subjective fevers for the past x 1 day. Denies any sore throat, runny nose, CP, palpitations, dysuria, flank pain, LE swelling, or N/V/D. + sick contact (wife with similar symptoms). Retired. No recent travel. In the ED, Chest X-Ray with extensive B/L interstitial opacities concerning for viral pneumonia (no official read). High supplemental O2 requirements. Also, noted to have KEEGAN. COVID pcr is positive. Was started on plaquenil     #Acute hypoxic respiratory failure requiring supplemental oxygen due to  SARS COV2 pneumonia with cytokine release syndrome  - saturating  95% on on 10l NRB saturating on room air: 84%  (vital signs checked by myself)   - CXR on admission:  diffuse bilateral interstitial and airspace opacities  - CXR: 3/29: stable opacities   - CXR 3/20: worsened B/l INTERSTITAL OPACITIES  - CXR 4/1: worsening opacities  - CXR 4/3: stable from prior  - COVID-19 postive; flu rsv negative  - Procalcitonin:  0.22<<0.22<0.28 CRP: 9>>9.63>>12.52 >>15.3 LDH: 646  D-dimer :61 fibrinogen>700; IL- 6 : 165   - EKG on admission: sinus tachycardia QTC 444ms  - EKG: 3/27: sinus tachy; QTC 462ms  - EKG: 3/29: 432ms  - s/p plaquanil,;  c/w  azithro  - BNP: 3422; trop: 0.04; ordered ECHO- PENDING  - ID consulted; recs appreciated started 100mg SC q6 ANakinra for 3 days  - trend ferritin, fibrinogen, CRP, TAG q48h to monitor for signs of cytokine release syndrome    - Supportive care:  anti- tussive, antiemetics; anti pyretics  - c/w air bone ,contact, droplet precautions  - prone positioning   - ICU consulted for evaluation: upgrade approved by Dr. Nicole    #Transaminitis- uptrending  - likely due to viral disease    #KEEGAN on 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 ~ 2.1 - resolved  - likely prerenal due to fever, decreased po intake, lasix/ Doubt ATN.  - Creatinine Trend: 1.5<--, 1.5<--, 1.9<--, 3.0<--, 3.6<--, 4.2<--  - non oliguric  - monitor BMP    # Mild HAGMA likely from elevated uremic acid levels.- resolved  - IVFs (LR). Trend BMPs  - c/w po bicarb 325 mg q 12 hr as per nephro    #Hx of HTN, HLD, and CAD s/p stent.   - Restart home meds, hold nephrotoxic agents.   - hold statin as lfts>75    ACTIVITY: Increase as tolerated ( within room)   DVT PPX: HEPARIN  GI PPX: not indicated   DIET: DASH   CODE: full   DIsposition: from home;  SPoke to sajan Donaldson [phone]  Updated plan with Dr. Davila      SIGN OUT AT 04-03-20 @ 15:05 GIVEN TO:

## 2020-04-03 NOTE — CONSULT NOTE ADULT - SUBJECTIVE AND OBJECTIVE BOX
Patient is a 74y old  Male who presents with a chief complaint of COVID 19 (02 Apr 2020 14:02)      HPI:  75 yo male with PMH HTN, HLD, CAD s/p stent presents c/o cough x 1 week. Pt states he had low grade temp and has had slight decreased appetite, was started on an oral antibiotic by his PMD. Today was having some SOB and [facility] was called and noted him to be hypoxic. Pt denies any chest pain, palpitations, N/V/D or abdominal pain. Wife also with URI sx. No recent travel.  Today Patient was seen not on any oxygen, he said that he breath better without NRM. prefers nasal cannula. speaking in full sentences       PAST MEDICAL & SURGICAL HISTORY:  CAD (coronary artery disease)  Kidney stones  High cholesterol  HTN (hypertension)  S/P drug eluting coronary stent placement: &gt; 10 years ago      SOCIAL HX:   Smoking  -ve                       ETOH         -ve                   Other  -ve    FAMILY HISTORY:  :  No known cardiovacular family hisotry     ROS:  See HPI     Allergies    No Known Allergies    Intolerances          PHYSICAL EXAM    ICU Vital Signs Last 24 Hrs  T(C): 37.4 (03 Apr 2020 05:30), Max: 37.6 (02 Apr 2020 20:30)  T(F): 99.3 (03 Apr 2020 05:30), Max: 99.7 (02 Apr 2020 20:30)  HR: 86 (03 Apr 2020 05:30) (56 - 104)  BP: 154/69 (03 Apr 2020 05:30) (95/71 - 173/76)  RR: 24 (03 Apr 2020 05:30) (20 - 24)  SpO2: 97% on NRM, 94 on 4L NC (03 Apr 2020 05:30) (92% - 97%)      General: Talking in full sentences.   HEENT:  HU              Lungs: midly tachypneic, Bilateral crackles  Cardiovascular: Regular  Gastrointestinal: Soft, Positive BS  Musculoskeletal: trace edema  Skin: Warm.  Intact  Neurological: No motor or sensory deficit       04-02-20 @ 07:01  -  04-03-20 @ 07:00  --------------------------------------------------------  IN:    sodium chloride 0.9%: 200 mL  Total IN: 200 mL    OUT:    Voided: 400 mL  Total OUT: 400 mL    Total NET: -200 mL          LABS:                          10.6   9.19  )-----------( 261      ( 03 Apr 2020 06:48 )             33.0                                               04-02    139  |  107  |  24<H>  ----------------------------<  108<H>  4.5   |  19  |  1.5    Ca    7.9<L>      02 Apr 2020 06:38  Mg     2.1     04-02    TPro  6.1  /  Alb  2.7<L>  /  TBili  0.9  /  DBili  x   /  AST  86<H>  /  ALT  58<H>  /  AlkPhos  108  04-02                                                                                           LIVER FUNCTIONS - ( 02 Apr 2020 06:38 )  Alb: 2.7 g/dL / Pro: 6.1 g/dL / ALK PHOS: 108 U/L / ALT: 58 U/L / AST: 86 U/L / GGT: x                                                                                                                                       X-Rays        CXR: B/l infiltrates                                                                            ECHO    MEDICATIONS  (STANDING):  atorvastatin 40 milliGRAM(s) Oral at bedtime  azithromycin   Tablet 250 milliGRAM(s) Oral daily  clopidogrel Tablet 75 milliGRAM(s) Oral daily  heparin  Injectable 5000 Unit(s) SubCutaneous every 12 hours  magnesium oxide 400 milliGRAM(s) Oral three times a day with meals  sodium bicarbonate 325 milliGRAM(s) Oral two times a day    MEDICATIONS  (PRN):  acetaminophen   Tablet .. 650 milliGRAM(s) Oral every 6 hours PRN Temp greater or equal to 38C (100.4F) Patient is a 74y old  Male who presents with a chief complaint of COVID 19 (02 Apr 2020 14:02)      HPI:  73 yo male with PMH HTN, HLD, CAD s/p stent presents c/o cough x 1 week. Pt states he had low grade temp and has had slight decreased appetite, was started on an oral antibiotic by his PMD. Today was having some SOB and [facility] was called and noted him to be hypoxic. Pt denies any chest pain, palpitations, N/V/D or abdominal pain. Wife also with URI sx. No recent travel.  Today Patient was seen not on any oxygen, he said that he breath better without NRM. prefers nasal cannula. speaking in full sentences       PAST MEDICAL & SURGICAL HISTORY:  CAD (coronary artery disease)  Kidney stones  High cholesterol  HTN (hypertension)  S/P drug eluting coronary stent placement: &gt; 10 years ago      SOCIAL HX:   Smoking  -ve                       ETOH         -ve                   Other  -ve    FAMILY HISTORY:  :  No known cardiovacular family hisotry     ROS:  See HPI     Allergies    No Known Allergies    Intolerances          PHYSICAL EXAM    ICU Vital Signs Last 24 Hrs  T(C): 37.4 (03 Apr 2020 05:30), Max: 37.6 (02 Apr 2020 20:30)  T(F): 99.3 (03 Apr 2020 05:30), Max: 99.7 (02 Apr 2020 20:30)  HR: 86 (03 Apr 2020 05:30) (56 - 104)  BP: 154/69 (03 Apr 2020 05:30) (95/71 - 173/76)  RR: 24 (03 Apr 2020 05:30) (20 - 24)  SpO2: 97% on NRM, 94 on 4L NC (03 Apr 2020 05:30) (92% - 97%)      General: using acc muscles.   HEENT:  HU              Lungs: tachypneic, Bilateral crackles  Cardiovascular: Regular  Gastrointestinal: Soft, Positive BS  Musculoskeletal: trace edema  Skin: Warm.  Intact  Neurological: No motor or sensory deficit       04-02-20 @ 07:01  -  04-03-20 @ 07:00  --------------------------------------------------------  IN:    sodium chloride 0.9%: 200 mL  Total IN: 200 mL    OUT:    Voided: 400 mL  Total OUT: 400 mL    Total NET: -200 mL          LABS:                          10.6   9.19  )-----------( 261      ( 03 Apr 2020 06:48 )             33.0                                               04-02    139  |  107  |  24<H>  ----------------------------<  108<H>  4.5   |  19  |  1.5    Ca    7.9<L>      02 Apr 2020 06:38  Mg     2.1     04-02    TPro  6.1  /  Alb  2.7<L>  /  TBili  0.9  /  DBili  x   /  AST  86<H>  /  ALT  58<H>  /  AlkPhos  108  04-02                                                                                           LIVER FUNCTIONS - ( 02 Apr 2020 06:38 )  Alb: 2.7 g/dL / Pro: 6.1 g/dL / ALK PHOS: 108 U/L / ALT: 58 U/L / AST: 86 U/L / GGT: x                                                                                                                                       X-Rays        CXR: B/l infiltrates                                                                            ECHO    MEDICATIONS  (STANDING):  atorvastatin 40 milliGRAM(s) Oral at bedtime  azithromycin   Tablet 250 milliGRAM(s) Oral daily  clopidogrel Tablet 75 milliGRAM(s) Oral daily  heparin  Injectable 5000 Unit(s) SubCutaneous every 12 hours  magnesium oxide 400 milliGRAM(s) Oral three times a day with meals  sodium bicarbonate 325 milliGRAM(s) Oral two times a day    MEDICATIONS  (PRN):  acetaminophen   Tablet .. 650 milliGRAM(s) Oral every 6 hours PRN Temp greater or equal to 38C (100.4F)

## 2020-04-03 NOTE — PROGRESS NOTE ADULT - ASSESSMENT
74y old Male who presents with a chief complaint of cough x 1 week  Currently admitted to medicine with the primary diagnosis of Acute hypoxic respiratory failure secondary to SARS COv2 pneumonia    IMPRESSION:  # COVID 19 PNA    CXR with diffuse bilateral opacities , almost white out    Procalcitonin, Serum: 0.22 (04-01-20 @ 06:25)  #Cytokine Release Syndrome   Triglycerides, Serum: 88 mg/dL (04-03-20 @ 06:48)  Fibrinogen Assay: 674.0 mg/dL (04-03-20 @ 06:48)  D-Dimer Assay, Quantitative: 4079 ng/mL DDU (04-03-20 @ 06:48)  Ferritin, Serum: 997 ng/mL (04-02-20 @ 06:38)  C-Reactive Protein, Serum: 15.30 mg/dL (04-02-20 @ 06:38)      RECOMMENDATIONS:  - complete 5 days of azithro  - s/p plaquenil 5 days   - trend CRP procalcitonin 74y old Male who presents with a chief complaint of cough x 1 week  Currently admitted to medicine with the primary diagnosis of Acute hypoxic respiratory failure secondary to SARS COv2 pneumonia    IMPRESSION:  # COVID 19 PNA    CXR with diffuse bilateral opacities , almost white out    Procalcitonin, Serum: 0.22 (04-01-20 @ 06:25)  #Cytokine Release Syndrome   Triglycerides, Serum: 88 mg/dL (04-03-20 @ 06:48)  Fibrinogen Assay: 674.0 mg/dL (04-03-20 @ 06:48)  D-Dimer Assay, Quantitative: 4079 ng/mL DDU (04-03-20 @ 06:48)  Ferritin, Serum: 997 ng/mL (04-02-20 @ 06:38)  C-Reactive Protein, Serum: 15.30 mg/dL (04-02-20 @ 06:38)      RECOMMENDATIONS:  - complete 5 days of azithro  - given hypoxemia on NRB and d-dimer>1000, ferritin >700, Recommend Anakinra 100mg SC q6h x 3 days   - s/p plaquenil 5 days   - trend CRP procalcitonin (unremarkable)

## 2020-04-03 NOTE — CHART NOTE - NSCHARTNOTEFT_GEN_A_CORE
With any updates or changes in clinical status PLEASE call Dr. Davila at  (cell) or  (home) and the patient's daughter, Mari Vera, at .

## 2020-04-03 NOTE — PROGRESS NOTE ADULT - SUBJECTIVE AND OBJECTIVE BOX
KEZIA MOYER 74y Male  MRN#: [id number]   CODE STATUS: FULL      SUBJECTIVE  Patient is a 74y old Male who presents with a chief complaint of SOB (03 Apr 2020 08:13)    Currently admitted to medicine with the primary diagnosis of SARS COV2 pneumonia    Today is hospital day 8d,   INTERVAL HPI/ OVERNIGHT EVENTS: patient very non compliant with using NRB    Examined patient, check vital signs by myself. saturating 89% on 4L , 91% on 6L, convinced him to use NRB with 10L saturating 95%    Urinating and stooling appropriately.    Present Today:           Earl Catheter (x)No/ ()Yes?   Indication:             Central Line (x)No/ ()Yes?   Indication:          IV Fluids (x)No/ ()Yes? Type:  Rate:  Indication:    OBJECTIVE  PAST MEDICAL & SURGICAL HISTORY  CAD (coronary artery disease)  Kidney stones  High cholesterol  HTN (hypertension)  S/P drug eluting coronary stent placement: &gt; 10 years ago    ALLERGIES:  No Known Allergies    HOME MEDICATIONS:  Home Medications:  clopidogrel 75 mg oral tablet: 1 tab(s) orally once a day (30 Mar 2020 10:01)  FEBUXOSTAT  40 MG TABS:  (26 Mar 2020 01:49)  FUROSEMIDE  40 MG TABS:  (26 Mar 2020 01:49)  metoprolol tartrate 25 mg oral tablet: 1 tab(s) orally 2 times a day (30 Mar 2020 10:01)  PIOGLITAZONE HYDROCHLORIDE  30 MG TABS:  (26 Mar 2020 01:49)  ROSUVASTATIN CALCIUM  20 MG TABS:  (26 Mar 2020 01:48)    MEDICATIONS:  STANDING MEDICATIONS  atorvastatin 40 milliGRAM(s) Oral at bedtime  azithromycin   Tablet 250 milliGRAM(s) Oral daily  clopidogrel Tablet 75 milliGRAM(s) Oral daily  heparin  Injectable 5000 Unit(s) SubCutaneous every 12 hours  magnesium oxide 400 milliGRAM(s) Oral three times a day with meals  sodium bicarbonate 325 milliGRAM(s) Oral two times a day    PRN MEDICATIONS  acetaminophen   Tablet .. 650 milliGRAM(s) Oral every 6 hours PRN      VITAL SIGNS: Last 24 Hours  T(C): 37.6 (03 Apr 2020 10:37), Max: 37.6 (02 Apr 2020 20:30)  T(F): 99.6 (03 Apr 2020 10:37), Max: 99.7 (02 Apr 2020 20:30)  HR: 83 (03 Apr 2020 10:37) (56 - 104)  BP: 103/53 (03 Apr 2020 10:37) (95/71 - 173/76)  RR: 26 (03 Apr 2020 08:30) (20 - 26)  SpO2: 94% (03 Apr 2020 10:37) (91% - 97%)    LABS:                        10.6   9.19  )-----------( 261      ( 03 Apr 2020 06:48 )             33.0     04-03    140  |  104  |  28<H>  ----------------------------<  105<H>  4.6   |  23  |  1.6<H>    Ca    7.6<L>      03 Apr 2020 06:48  Mg     2.2     04-03    TPro  5.7<L>  /  Alb  2.4<L>  /  TBili  0.9  /  DBili  x   /  AST  83<H>  /  ALT  60<H>  /  AlkPhos  109  04-03    RADIOLOGY:  Xray Chest 1 View- PORTABLE-Routine:   EXAM:  XR CHEST PORTABLE ROUTINE 1V        PROCEDURE DATE:  04/03/2020    INTERPRETATION:  Clinical History/Reason for Exam:  Shortness of Breath    Comparison: XR CHEST 4/1/2020 5:08 PM.  Findings:    Technique/Positioning:  Frontal portable radiograph of the chest.    Support devices:  Battery pack overlies left hilum    Cardiac/mediastinum/hilum: Stable magnified cardiac silhouette    Lung parenchyma/ Pleura: Stable low lung volumes bilateral diffuse opacities. No pneumothorax      Skeleton/soft tissues: Stable  Impression:    Stable low lung volumes/bilateral diffuse opacities. No pneumothorax  MIAH DAVIS M.D., ATTENDING RADIOLOGIST  This document has been electronically signed. Apr  3 28300:36AM(04-03-20 @ 09:11)  ECHO:      PHYSICAL EXAM:    GENERAL: NAD, well-developed, AAOx3  HEENT:  Atraumatic, Normocephalic. EOMI, PERRLA, conjunctiva and sclera clear, No JVD  PULMONARY: Clear to auscultation bilaterally; No wheeze  CARDIOVASCULAR: Regular rate and rhythm; No murmurs, rubs, or gallops  GASTROINTESTINAL: Soft, Nontender, Nondistended; Bowel sounds present  MUSCULOSKELETAL:  2+ Peripheral Pulses, No clubbing, cyanosis, or edema  NEUROLOGY: non-focal  SKIN: No rashes or lesions    ASSESSMENT & PLAN    #Acute hypoxic respiratory failure likely SARS COV2 pneumonia  - saturating  % on  L O2, febrile,   - CXR on admission:  - Procalcitonin:  CRP:   LDH:     - repeat CXR:   - EKG on admission:  - On plaquaneil and azithro  - ID consulted  - Supportive care: tylenol  pain, antussive, antiemetics  - montior for hypoxia , upgrade to ICU if deteriorates      PT/REHAB   ACTIVITY: Increase as tolerated   DVT PPX:  GI PPX:  DIET:  CODE:  DIsposition: from home;   Pending: KEZIA MOYER 74y Male  MRN#: [id number]   CODE STATUS: FULL      SUBJECTIVE  Patient is a 74y old Male who presents with a chief complaint of SOB (03 Apr 2020 08:13)    Currently admitted to medicine with the primary diagnosis of SARS COV2 pneumonia    Today is hospital day 8d,   INTERVAL HPI/ OVERNIGHT EVENTS: patient very non compliant with using NRB    Examined patient, check vital signs by myself. saturating 89% on 4L , 91% on 6L, convinced him to use NRB with 10L saturating 95%    Urinating and stooling appropriately.    Present Today:           Earl Catheter (x)No/ ()Yes?   Indication:             Central Line (x)No/ ()Yes?   Indication:          IV Fluids (x)No/ ()Yes? Type:  Rate:  Indication:    OBJECTIVE  PAST MEDICAL & SURGICAL HISTORY  CAD (coronary artery disease)  Kidney stones  High cholesterol  HTN (hypertension)  S/P drug eluting coronary stent placement: &gt; 10 years ago    ALLERGIES:  No Known Allergies    HOME MEDICATIONS:  Home Medications:  clopidogrel 75 mg oral tablet: 1 tab(s) orally once a day (30 Mar 2020 10:01)  FEBUXOSTAT  40 MG TABS:  (26 Mar 2020 01:49)  FUROSEMIDE  40 MG TABS:  (26 Mar 2020 01:49)  metoprolol tartrate 25 mg oral tablet: 1 tab(s) orally 2 times a day (30 Mar 2020 10:01)  PIOGLITAZONE HYDROCHLORIDE  30 MG TABS:  (26 Mar 2020 01:49)  ROSUVASTATIN CALCIUM  20 MG TABS:  (26 Mar 2020 01:48)    MEDICATIONS:  STANDING MEDICATIONS  atorvastatin 40 milliGRAM(s) Oral at bedtime  azithromycin   Tablet 250 milliGRAM(s) Oral daily  clopidogrel Tablet 75 milliGRAM(s) Oral daily  heparin  Injectable 5000 Unit(s) SubCutaneous every 12 hours  magnesium oxide 400 milliGRAM(s) Oral three times a day with meals  sodium bicarbonate 325 milliGRAM(s) Oral two times a day    PRN MEDICATIONS  acetaminophen   Tablet .. 650 milliGRAM(s) Oral every 6 hours PRN      VITAL SIGNS: Last 24 Hours  T(C): 37.6 (03 Apr 2020 10:37), Max: 37.6 (02 Apr 2020 20:30)  T(F): 99.6 (03 Apr 2020 10:37), Max: 99.7 (02 Apr 2020 20:30)  HR: 83 (03 Apr 2020 10:37) (56 - 104)  BP: 103/53 (03 Apr 2020 10:37) (95/71 - 173/76)  RR: 26 (03 Apr 2020 08:30) (20 - 26)  SpO2: 94% (03 Apr 2020 10:37) (91% - 97%)    LABS:                        10.6   9.19  )-----------( 261      ( 03 Apr 2020 06:48 )             33.0     04-03    140  |  104  |  28<H>  ----------------------------<  105<H>  4.6   |  23  |  1.6<H>    Ca    7.6<L>      03 Apr 2020 06:48  Mg     2.2     04-03    TPro  5.7<L>  /  Alb  2.4<L>  /  TBili  0.9  /  DBili  x   /  AST  83<H>  /  ALT  60<H>  /  AlkPhos  109  04-03    RADIOLOGY:  Xray Chest 1 View- PORTABLE-Routine:   EXAM:  XR CHEST PORTABLE ROUTINE 1V        PROCEDURE DATE:  04/03/2020    INTERPRETATION:  Clinical History/Reason for Exam:  Shortness of Breath    Comparison: XR CHEST 4/1/2020 5:08 PM.  Findings:    Technique/Positioning:  Frontal portable radiograph of the chest.    Support devices:  Battery pack overlies left hilum    Cardiac/mediastinum/hilum: Stable magnified cardiac silhouette    Lung parenchyma/ Pleura: Stable low lung volumes bilateral diffuse opacities. No pneumothorax      Skeleton/soft tissues: Stable  Impression:    Stable low lung volumes/bilateral diffuse opacities. No pneumothorax  MIAH DAVIS M.D., ATTENDING RADIOLOGIST  This document has been electronically signed. Apr  3 59925:36AM(04-03-20 @ 09:11)  ECHO:      PHYSICAL EXAM:    GENERAL: NAD, well-developed, AAOx3. obese  HEENT:  Atraumatic, Normocephalic. 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 Associated with SOB and subjective fevers for the past x 1 day. Denies any sore throat, runny nose, CP, palpitations, dysuria, flank pain, LE swelling, or N/V/D. + sick contact (wife with similar symptoms). Retired. No recent travel. In the ED, Chest X-Ray with extensive B/L interstitial opacities concerning for viral pneumonia (no official read). High supplemental O2 requirements. Also, noted to have KEEGAN. COVID pcr is positive. Was started on plaquenil     #Acute hypoxic respiratory failure requiring supplemental oxygen due to  SARS COV2 pneumonia with cytokine release syndrome  - saturating  95% on on 10l NRB saturating on room air: 84%  (vital signs checked by myself)   - CXR on admission:  diffuse bilateral interstitial and airspace opacities  - CXR: 3/29: stable opacities   - CXR 3/20: worsened B/l INTERSTITAL OPACITIES  - CXR 4/1: worsening opacities  - CXR 4/3: stable from prior  - COVID-19 postive; flu rsv negative  - Procalcitonin:  0.22<<0.22<0.28 CRP: 9>>9.63>>12.52 >>15.3 LDH: 646  D-dimer :61 fibrinogen>700; IL- 6 : 165   - EKG on admission: sinus tachycardia QTC 444ms  - EKG: 3/27: sinus tachy; QTC 462ms  - EKG: 3/29: 432ms  - s/p plaquanil,;  c/w  azithro  - BNP: 3422; trop: 0.04; ordered ECHO- PENDING  - ID consulted; recs appreciated  - trend ferritin, fibrinogen, CRP, TAG q48h to monitor for signs of cytokine release syndrome    - Supportive care:  anti- tussive, antiemetics; anti pyretics  - c/w air bone ,contact, droplet precautions  - prone positioning   - ICU consulted for evaluation:     #Transaminitis- uptrending  - likely due to viral disease    #KEEGAN on 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 ~ 2.1 - resolved  - likely prerenal due to fever, decreased po intake, lasix/ Doubt ATN.  - Creatinine Trend: 1.5<--, 1.5<--, 1.9<--, 3.0<--, 3.6<--, 4.2<--  - non oliguric  - Hold nephrotoxic agents.    # Mild HAGMA likely from elevated uremic acid levels.- resolved  - IVFs (LR). Trend BMPs  - c/w po bicarb 325 mg q 12 hr as per nephro    #Hx of HTN, HLD, and CAD s/p stent.   - Restart home meds, hold nephrotoxic agents.   - hold statin as lfts>75    ACTIVITY: Increase as tolerated ( within room)   DVT PPX: HEPARIN  GI PPX: not indicated   DIET: DASH   CODE: full   DIsposition: from home;  Pending: ICU eval, clinical improvement  SPoke to sajan Donaldson [phone]  Updated plan with Dr. Davila KEZIA MOYER 74y Male  MRN#: [id number]   CODE STATUS: FULL      SUBJECTIVE  Patient is a 74y old Male who presents with a chief complaint of SOB (03 Apr 2020 08:13)    Currently admitted to medicine with the primary diagnosis of SARS COV2 pneumonia    Today is hospital day 8d,   INTERVAL HPI/ OVERNIGHT EVENTS: patient very non compliant with using NRB    Examined patient, check vital signs by myself. saturating 89% on 4L , 91% on 6L, convinced him to use NRB with 10L saturating 95%    Urinating and stooling appropriately.    Present Today:           Earl Catheter (x)No/ ()Yes?   Indication:             Central Line (x)No/ ()Yes?   Indication:          IV Fluids (x)No/ ()Yes? Type:  Rate:  Indication:    OBJECTIVE  PAST MEDICAL & SURGICAL HISTORY  CAD (coronary artery disease)  Kidney stones  High cholesterol  HTN (hypertension)  S/P drug eluting coronary stent placement: &gt; 10 years ago    ALLERGIES:  No Known Allergies    HOME MEDICATIONS:  Home Medications:  clopidogrel 75 mg oral tablet: 1 tab(s) orally once a day (30 Mar 2020 10:01)  FEBUXOSTAT  40 MG TABS:  (26 Mar 2020 01:49)  FUROSEMIDE  40 MG TABS:  (26 Mar 2020 01:49)  metoprolol tartrate 25 mg oral tablet: 1 tab(s) orally 2 times a day (30 Mar 2020 10:01)  PIOGLITAZONE HYDROCHLORIDE  30 MG TABS:  (26 Mar 2020 01:49)  ROSUVASTATIN CALCIUM  20 MG TABS:  (26 Mar 2020 01:48)    MEDICATIONS:  STANDING MEDICATIONS  atorvastatin 40 milliGRAM(s) Oral at bedtime  azithromycin   Tablet 250 milliGRAM(s) Oral daily  clopidogrel Tablet 75 milliGRAM(s) Oral daily  heparin  Injectable 5000 Unit(s) SubCutaneous every 12 hours  magnesium oxide 400 milliGRAM(s) Oral three times a day with meals  sodium bicarbonate 325 milliGRAM(s) Oral two times a day    PRN MEDICATIONS  acetaminophen   Tablet .. 650 milliGRAM(s) Oral every 6 hours PRN      VITAL SIGNS: Last 24 Hours  T(C): 37.6 (03 Apr 2020 10:37), Max: 37.6 (02 Apr 2020 20:30)  T(F): 99.6 (03 Apr 2020 10:37), Max: 99.7 (02 Apr 2020 20:30)  HR: 83 (03 Apr 2020 10:37) (56 - 104)  BP: 103/53 (03 Apr 2020 10:37) (95/71 - 173/76)  RR: 26 (03 Apr 2020 08:30) (20 - 26)  SpO2: 94% (03 Apr 2020 10:37) (91% - 97%)    LABS:                        10.6   9.19  )-----------( 261      ( 03 Apr 2020 06:48 )             33.0     04-03    140  |  104  |  28<H>  ----------------------------<  105<H>  4.6   |  23  |  1.6<H>    Ca    7.6<L>      03 Apr 2020 06:48  Mg     2.2     04-03    TPro  5.7<L>  /  Alb  2.4<L>  /  TBili  0.9  /  DBili  x   /  AST  83<H>  /  ALT  60<H>  /  AlkPhos  109  04-03    RADIOLOGY:  Xray Chest 1 View- PORTABLE-Routine:   EXAM:  XR CHEST PORTABLE ROUTINE 1V        PROCEDURE DATE:  04/03/2020    INTERPRETATION:  Clinical History/Reason for Exam:  Shortness of Breath    Comparison: XR CHEST 4/1/2020 5:08 PM.  Findings:    Technique/Positioning:  Frontal portable radiograph of the chest.    Support devices:  Battery pack overlies left hilum    Cardiac/mediastinum/hilum: Stable magnified cardiac silhouette    Lung parenchyma/ Pleura: Stable low lung volumes bilateral diffuse opacities. No pneumothorax      Skeleton/soft tissues: Stable  Impression:    Stable low lung volumes/bilateral diffuse opacities. No pneumothorax  MIAH DAVIS M.D., ATTENDING RADIOLOGIST  This document has been electronically signed. Apr  3 51966:36AM(04-03-20 @ 09:11)  ECHO:      PHYSICAL EXAM:    GENERAL: NAD, well-developed, AAOx3. obese  HEENT:  Atraumatic, Normocephalic. 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 Associated with SOB and subjective fevers for the past x 1 day. Denies any sore throat, runny nose, CP, palpitations, dysuria, flank pain, LE swelling, or N/V/D. + sick contact (wife with similar symptoms). Retired. No recent travel. In the ED, Chest X-Ray with extensive B/L interstitial opacities concerning for viral pneumonia (no official read). High supplemental O2 requirements. Also, noted to have KEEGAN. COVID pcr is positive. Was started on plaquenil     #Acute hypoxic respiratory failure requiring supplemental oxygen due to  SARS COV2 pneumonia with cytokine release syndrome  - saturating  95% on on 10l NRB saturating on room air: 84%  (vital signs checked by myself)   - CXR on admission:  diffuse bilateral interstitial and airspace opacities  - CXR: 3/29: stable opacities   - CXR 3/20: worsened B/l INTERSTITAL OPACITIES  - CXR 4/1: worsening opacities  - CXR 4/3: stable from prior  - COVID-19 postive; flu rsv negative  - Procalcitonin:  0.22<<0.22<0.28 CRP: 9>>9.63>>12.52 >>15.3 LDH: 646  D-dimer :61 fibrinogen>700; IL- 6 : 165   - EKG on admission: sinus tachycardia QTC 444ms  - EKG: 3/27: sinus tachy; QTC 462ms  - EKG: 3/29: 432ms  - s/p plaquanil,;  c/w  azithro  - BNP: 3422; trop: 0.04; ordered ECHO- PENDING  - ID consulted; recs appreciated started 100mg SC q6 ANakinra for 3 days  - trend ferritin, fibrinogen, CRP, TAG q48h to monitor for signs of cytokine release syndrome    - Supportive care:  anti- tussive, antiemetics; anti pyretics  - c/w air bone ,contact, droplet precautions  - prone positioning   - ICU consulted for evaluation:     #Transaminitis- uptrending  - likely due to viral disease    #KEEGAN on 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 ~ 2.1 - resolved  - likely prerenal due to fever, decreased po intake, lasix/ Doubt ATN.  - Creatinine Trend: 1.5<--, 1.5<--, 1.9<--, 3.0<--, 3.6<--, 4.2<--  - non oliguric  - Hold nephrotoxic agents.    # Mild HAGMA likely from elevated uremic acid levels.- resolved  - IVFs (LR). Trend BMPs  - c/w po bicarb 325 mg q 12 hr as per nephro    #Hx of HTN, HLD, and CAD s/p stent.   - Restart home meds, hold nephrotoxic agents.   - hold statin as lfts>75    ACTIVITY: Increase as tolerated ( within room)   DVT PPX: HEPARIN  GI PPX: not indicated   DIET: DASH   CODE: full   DIsposition: from home;  Pending: ICU eval, clinical improvement  SPoke to sajan Donaldson [phone]  Updated plan with Dr. Davila

## 2020-04-04 LAB
ALBUMIN SERPL ELPH-MCNC: 2.7 G/DL — LOW (ref 3.5–5.2)
ALP SERPL-CCNC: 132 U/L — HIGH (ref 30–115)
ALT FLD-CCNC: 81 U/L — HIGH (ref 0–41)
ANION GAP SERPL CALC-SCNC: 13 MMOL/L — SIGNIFICANT CHANGE UP (ref 7–14)
APTT BLD: 67.2 SEC — HIGH (ref 27–39.2)
APTT BLD: 78.4 SEC — CRITICAL HIGH (ref 27–39.2)
AST SERPL-CCNC: 111 U/L — HIGH (ref 0–41)
BASOPHILS # BLD AUTO: 0.01 K/UL — SIGNIFICANT CHANGE UP (ref 0–0.2)
BASOPHILS NFR BLD AUTO: 0.1 % — SIGNIFICANT CHANGE UP (ref 0–1)
BILIRUB SERPL-MCNC: 0.7 MG/DL — SIGNIFICANT CHANGE UP (ref 0.2–1.2)
BUN SERPL-MCNC: 30 MG/DL — HIGH (ref 10–20)
CALCIUM SERPL-MCNC: 7.8 MG/DL — LOW (ref 8.5–10.1)
CHLORIDE SERPL-SCNC: 102 MMOL/L — SIGNIFICANT CHANGE UP (ref 98–110)
CHOLEST SERPL-MCNC: 80 MG/DL — LOW (ref 100–200)
CO2 SERPL-SCNC: 22 MMOL/L — SIGNIFICANT CHANGE UP (ref 17–32)
CREAT SERPL-MCNC: 1.4 MG/DL — SIGNIFICANT CHANGE UP (ref 0.7–1.5)
CRP SERPL-MCNC: 13.35 MG/DL — HIGH (ref 0–0.4)
D DIMER BLD IA.RAPID-MCNC: 2183 NG/ML DDU — HIGH (ref 0–230)
EOSINOPHIL # BLD AUTO: 0 K/UL — SIGNIFICANT CHANGE UP (ref 0–0.7)
EOSINOPHIL NFR BLD AUTO: 0 % — SIGNIFICANT CHANGE UP (ref 0–8)
FERRITIN SERPL-MCNC: 1239 NG/ML — HIGH (ref 30–400)
FIBRINOGEN PPP-MCNC: >700 MG/DL — HIGH (ref 204.4–570.6)
GLUCOSE SERPL-MCNC: 311 MG/DL — HIGH (ref 70–99)
HCT VFR BLD CALC: 34.3 % — LOW (ref 42–52)
HDLC SERPL-MCNC: 28 MG/DL — LOW
HGB BLD-MCNC: 11 G/DL — LOW (ref 14–18)
IMM GRANULOCYTES NFR BLD AUTO: 0.4 % — HIGH (ref 0.1–0.3)
LIPID PNL WITH DIRECT LDL SERPL: 30 MG/DL — SIGNIFICANT CHANGE UP (ref 4–129)
LYMPHOCYTES # BLD AUTO: 0.74 K/UL — LOW (ref 1.2–3.4)
LYMPHOCYTES # BLD AUTO: 7.5 % — LOW (ref 20.5–51.1)
MAGNESIUM SERPL-MCNC: 2.2 MG/DL — SIGNIFICANT CHANGE UP (ref 1.8–2.4)
MCHC RBC-ENTMCNC: 29.3 PG — SIGNIFICANT CHANGE UP (ref 27–31)
MCHC RBC-ENTMCNC: 32.1 G/DL — SIGNIFICANT CHANGE UP (ref 32–37)
MCV RBC AUTO: 91.2 FL — SIGNIFICANT CHANGE UP (ref 80–94)
MONOCYTES # BLD AUTO: 0.14 K/UL — SIGNIFICANT CHANGE UP (ref 0.1–0.6)
MONOCYTES NFR BLD AUTO: 1.4 % — LOW (ref 1.7–9.3)
NEUTROPHILS # BLD AUTO: 8.88 K/UL — HIGH (ref 1.4–6.5)
NEUTROPHILS NFR BLD AUTO: 90.6 % — HIGH (ref 42.2–75.2)
NRBC # BLD: 0 /100 WBCS — SIGNIFICANT CHANGE UP (ref 0–0)
PLATELET # BLD AUTO: 258 K/UL — SIGNIFICANT CHANGE UP (ref 130–400)
POTASSIUM SERPL-MCNC: 4.6 MMOL/L — SIGNIFICANT CHANGE UP (ref 3.5–5)
POTASSIUM SERPL-SCNC: 4.6 MMOL/L — SIGNIFICANT CHANGE UP (ref 3.5–5)
PROCALCITONIN SERPL-MCNC: 0.25 NG/ML — HIGH (ref 0.02–0.1)
PROT SERPL-MCNC: 6.1 G/DL — SIGNIFICANT CHANGE UP (ref 6–8)
RBC # BLD: 3.76 M/UL — LOW (ref 4.7–6.1)
RBC # FLD: 13.7 % — SIGNIFICANT CHANGE UP (ref 11.5–14.5)
SODIUM SERPL-SCNC: 137 MMOL/L — SIGNIFICANT CHANGE UP (ref 135–146)
TOTAL CHOLESTEROL/HDL RATIO MEASUREMENT: 2.9 RATIO — LOW (ref 4–5.5)
TRIGL SERPL-MCNC: 100 MG/DL — SIGNIFICANT CHANGE UP (ref 10–149)
WBC # BLD: 9.81 K/UL — SIGNIFICANT CHANGE UP (ref 4.8–10.8)
WBC # FLD AUTO: 9.81 K/UL — SIGNIFICANT CHANGE UP (ref 4.8–10.8)

## 2020-04-04 PROCEDURE — 93010 ELECTROCARDIOGRAM REPORT: CPT

## 2020-04-04 PROCEDURE — 99233 SBSQ HOSP IP/OBS HIGH 50: CPT

## 2020-04-04 RX ORDER — DILTIAZEM HCL 120 MG
7.5 CAPSULE, EXT RELEASE 24 HR ORAL
Qty: 125 | Refills: 0 | Status: DISCONTINUED | OUTPATIENT
Start: 2020-04-04 | End: 2020-04-04

## 2020-04-04 RX ORDER — HEPARIN SODIUM 5000 [USP'U]/ML
1000 INJECTION INTRAVENOUS; SUBCUTANEOUS
Qty: 25000 | Refills: 0 | Status: DISCONTINUED | OUTPATIENT
Start: 2020-04-04 | End: 2020-04-08

## 2020-04-04 RX ORDER — DILTIAZEM HCL 120 MG
30 CAPSULE, EXT RELEASE 24 HR ORAL EVERY 6 HOURS
Refills: 0 | Status: DISCONTINUED | OUTPATIENT
Start: 2020-04-04 | End: 2020-04-04

## 2020-04-04 RX ORDER — POLYETHYLENE GLYCOL 3350 17 G/17G
17 POWDER, FOR SOLUTION ORAL ONCE
Refills: 0 | Status: COMPLETED | OUTPATIENT
Start: 2020-04-04 | End: 2020-04-04

## 2020-04-04 RX ORDER — DILTIAZEM HCL 120 MG
10 CAPSULE, EXT RELEASE 24 HR ORAL
Qty: 125 | Refills: 0 | Status: DISCONTINUED | OUTPATIENT
Start: 2020-04-04 | End: 2020-04-05

## 2020-04-04 RX ADMIN — Medication 60 MILLIGRAM(S): at 17:06

## 2020-04-04 RX ADMIN — Medication 5 MG/HR: at 07:07

## 2020-04-04 RX ADMIN — Medication 325 MILLIGRAM(S): at 17:04

## 2020-04-04 RX ADMIN — Medication 30 MILLIGRAM(S): at 05:45

## 2020-04-04 RX ADMIN — MEROPENEM 100 MILLIGRAM(S): 1 INJECTION INTRAVENOUS at 05:48

## 2020-04-04 RX ADMIN — Medication 60 MILLIGRAM(S): at 05:47

## 2020-04-04 RX ADMIN — Medication 100 MILLIGRAM(S): at 06:53

## 2020-04-04 RX ADMIN — CLOPIDOGREL BISULFATE 75 MILLIGRAM(S): 75 TABLET, FILM COATED ORAL at 11:29

## 2020-04-04 RX ADMIN — HEPARIN SODIUM 10 UNIT(S)/HR: 5000 INJECTION INTRAVENOUS; SUBCUTANEOUS at 05:45

## 2020-04-04 RX ADMIN — Medication 100 MILLIGRAM(S): at 17:05

## 2020-04-04 RX ADMIN — Medication 325 MILLIGRAM(S): at 05:47

## 2020-04-04 RX ADMIN — Medication 100 MILLIGRAM(S): at 00:51

## 2020-04-04 RX ADMIN — ATORVASTATIN CALCIUM 40 MILLIGRAM(S): 80 TABLET, FILM COATED ORAL at 20:46

## 2020-04-04 RX ADMIN — MAGNESIUM OXIDE 400 MG ORAL TABLET 400 MILLIGRAM(S): 241.3 TABLET ORAL at 17:05

## 2020-04-04 RX ADMIN — AZITHROMYCIN 250 MILLIGRAM(S): 500 TABLET, FILM COATED ORAL at 11:29

## 2020-04-04 RX ADMIN — POLYETHYLENE GLYCOL 3350 17 GRAM(S): 17 POWDER, FOR SOLUTION ORAL at 11:55

## 2020-04-04 RX ADMIN — MAGNESIUM OXIDE 400 MG ORAL TABLET 400 MILLIGRAM(S): 241.3 TABLET ORAL at 11:30

## 2020-04-04 RX ADMIN — Medication 10 MG/HR: at 17:18

## 2020-04-04 RX ADMIN — Medication 100 MILLIGRAM(S): at 11:55

## 2020-04-04 RX ADMIN — MAGNESIUM OXIDE 400 MG ORAL TABLET 400 MILLIGRAM(S): 241.3 TABLET ORAL at 10:04

## 2020-04-04 NOTE — PROGRESS NOTE ADULT - SUBJECTIVE AND OBJECTIVE BOX
CTU Attending Progress Daily Note     04 Apr 2020 19:51  Admited 03-26-20, Hospital Day 9d    HPI:  73 yo male with PMH HTN, HLD, CAD s/p stent presents c/o cough x 1 week. Pt states he had low grade temp and has had slight decreased appetite, was started on an oral antibiotic by his PMD. Today was having some SOB and Jarrett was called and noted him to be hypoxic. Pt denies any chest pain, palpitations, N/V/D or abdominal pain. Wife also with URI sx. No recent travel.  Pt also found to have elevated Creatinine (26 Mar 2020 01:46)    Home Medications:  clopidogrel 75 mg oral tablet: 1 tab(s) orally once a day (30 Mar 2020 10:01)  FEBUXOSTAT  40 MG TABS:  (26 Mar 2020 01:49)  FUROSEMIDE  40 MG TABS:  (26 Mar 2020 01:49)  metoprolol tartrate 25 mg oral tablet: 1 tab(s) orally 2 times a day (30 Mar 2020 10:01)  PIOGLITAZONE HYDROCHLORIDE  30 MG TABS:  (26 Mar 2020 01:49)  ROSUVASTATIN CALCIUM  20 MG TABS:  (26 Mar 2020 01:48)    FAMILY HISTORY:    PAST MEDICAL & SURGICAL HISTORY:  CAD (coronary artery disease)  Kidney stones  High cholesterol  HTN (hypertension)  S/P drug eluting coronary stent placement: &gt; 10 years ago    Interval event for past 24 hr:  COMFORTKEZIA  74y had no event.   Current Complains:  KEZIA MOYER has no new complains  Allergies    No Known Allergies    Intolerances      OBJECTIVE:  Vitals last 24 hrs  T(C): 36.3 (04-04-20 @ 13:00), Max: 36.9 (04-04-20 @ 05:00)  T(F): 97.4 (04-04-20 @ 13:00), Max: 98.4 (04-04-20 @ 05:00)  HR: 73 (04-04-20 @ 17:11) (66 - 133)  BP: 143/62 (04-04-20 @ 17:11) (132/79 - 148/79)  ABP: --  ABP(mean): --  RR: 18 (04-04-20 @ 17:11) (18 - 18)  SpO2: 97% (04-04-20 @ 19:45) (93% - 99%)  CVP(mm Hg): --  CI: --  PA: --  PA(direct): --  PCWP: --  SVR: --  PVR: --        CAPILLARY BLOOD GLUCOSE        LABS:                          11.0   9.81  )-----------( 258      ( 04 Apr 2020 10:02 )             34.3     Hemoglobin: 11.0 g/dL (04-04 @ 10:02)  Hemoglobin: 10.6 g/dL (04-03 @ 06:48)  Hemoglobin: 12.2 g/dL (04-02 @ 06:38)    04-04    137  |  102  |  30<H>  ----------------------------<  311<H>  4.6   |  22  |  1.4    Ca    7.8<L>      04 Apr 2020 10:02  Mg     2.2     04-04    TPro  6.1  /  Alb  2.7<L>  /  TBili  0.7  /  DBili  x   /  AST  111<H>  /  ALT  81<H>  /  AlkPhos  132<H>  04-04    Creatinine, Serum: 1.4 mg/dL (04-04 @ 10:02)  Creatinine, Serum: 1.6 mg/dL (04-03 @ 06:48)  Creatinine, Serum: 1.5 mg/dL (04-02 @ 06:38)  Creatinine, Serum: 1.6 mg/dL (04-01 @ 06:25)    PTT - ( 04 Apr 2020 16:51 )  PTT:78.4 sec      HOSPITAL MEDICATIONS:  MEDICATIONS  (STANDING):  anakinra Injectable 100 milliGRAM(s) SubCutaneous <User Schedule>  atorvastatin 40 milliGRAM(s) Oral at bedtime  clopidogrel Tablet 75 milliGRAM(s) Oral daily  diltiazem Infusion 10 mG/Hr (10 mL/Hr) IV Continuous <Continuous>  heparin  Infusion 1000 Unit(s)/Hr (9 mL/Hr) IV Continuous <Continuous>  magnesium oxide 400 milliGRAM(s) Oral three times a day with meals  methylPREDNISolone sodium succinate Injectable 60 milliGRAM(s) IV Push every 12 hours  sodium bicarbonate 325 milliGRAM(s) Oral two times a day    MEDICATIONS  (PRN):  acetaminophen   Tablet .. 650 milliGRAM(s) Oral every 6 hours PRN Temp greater or equal to 38C (100.4F)      REVIEW OF SYSTEMS:  CONSTITUTIONAL: [X] all negative; [ ] weakness, [ ] fevers, [ ] chills  EYES/ENT: [X] all negative; [ ] visual changes, [ ] vertigo, [ ] throat pain, [ ] eye pain  NECK: [X] all negative; [ ] pain, [ ] stiffness  RESPIRATORY: [ ] all negative, [x ] cough, [ ] wheezing, [ ] hemoptysis, [x ] shortness of breath - better, [  ] chest pain  CARDIOVASCULAR: [ ] all negative; [ ] anginal chest pain, [ ] palpitations, [ ] orthopnea  GASTROINTESTINAL: [X] all negative; [ ]abdominal pain, [ ] nausea, [ ] vomiting, [ ] hematemesis, [ ] diarrhea, [ ] constipation, [ ] melena, [ ] hematochezia.  GENITOURINARY: [X] all negative; [ ] dysuria, [ ] frequency, [ ] hematuria  NEUROLOGICAL: [X] all negative; [ ] numbness, [ ] weakness, [ ] paresthesias  MUSCULOSKELETAL: [X] all negative, [ ] joint pain, [ ] joint swelling, [ ] joint redness, [ ] bone pain  SKIN: [X] all negative; [ ] itching, [ ] burning, [ ] rashes, [ ] lesions   All other review of systems is negative unless indicated above.    [  ] Unable to assess ROS because     PHYSICAL EXAM:          CONSTITUTIONAL: Well-developed; well-nourished; in no acute distress.   	SKIN: warm, dry, no rashes or lesions  	HEENT: Atraumatic. Normocephalic. PERRL. Moist membranes, no conjunctival injection, sclera clear  	NECK: Supple; non tender.  No JVD. No lymphadenopathy.  	CARD: Normal S1, S2. Rate and Rhythm are regular. No murmurs.  	RESP: Good air entry bilaterally, no wheezes, + rales no rhonchi.  	ABD: Soft, not tender, not distended, no CVA tendernass, no rebound no guarding, bowel sounds present  	EXT: Normal ROM.  No clubbing, no cyanosis, no pedal edema, no calf pain b/l, Peripheral pulses intact.  	LYMPH: No acute cervical adenopathy.  	NEURO: Alert, awake, motor 5/5 R, 5/5 L, sensation intact bilat, CN 2-12 intact,          PSYCH: Cooperative, appropriate. Alert & oriented x 3    RADIOLOGY:  X Reviewed and interpreted by me  CxR from 04-03-20 shows diffuse bilateral infiltrates, no pneumothorax, no effusion, no cardiomegaly,

## 2020-04-04 NOTE — PROGRESS NOTE ADULT - SUBJECTIVE AND OBJECTIVE BOX
KEZIA MOYER  74y  Male      Patient is a 74y old  Male who presents with a chief complaint of COVID19 (03 Apr 2020 11:42)      INTERVAL HPI/OVERNIGHT EVENTS:  He feels good, SOB improved.   Vital Signs Last 24 Hrs  T(C): 36.3 (04 Apr 2020 13:00), Max: 37.4 (03 Apr 2020 16:00)  T(F): 97.4 (04 Apr 2020 13:00), Max: 99.3 (03 Apr 2020 16:00)  HR: 66 (04 Apr 2020 13:00) (66 - 133)  BP: 133/59 (04 Apr 2020 13:00) (132/79 - 178/81)  BP(mean): 101 (03 Apr 2020 21:33) (101 - 101)  RR: 18 (04 Apr 2020 13:00) (18 - 18)  SpO2: 97% (04 Apr 2020 14:03) (93% - 99%)      04-04-20 @ 07:01  -  04-04-20 @ 14:10  --------------------------------------------------------  IN: 82.5 mL / OUT: 0 mL / NET: 82.5 mL            Consultant(s) Notes Reviewed:  [x ] YES  [ ] NO          MEDICATIONS  (STANDING):  anakinra Injectable 100 milliGRAM(s) SubCutaneous <User Schedule>  atorvastatin 40 milliGRAM(s) Oral at bedtime  clopidogrel Tablet 75 milliGRAM(s) Oral daily  diltiazem Infusion 10 mG/Hr (10 mL/Hr) IV Continuous <Continuous>  heparin  Infusion 1000 Unit(s)/Hr (10 mL/Hr) IV Continuous <Continuous>  magnesium oxide 400 milliGRAM(s) Oral three times a day with meals  methylPREDNISolone sodium succinate Injectable 60 milliGRAM(s) IV Push every 12 hours  sodium bicarbonate 325 milliGRAM(s) Oral two times a day    MEDICATIONS  (PRN):  acetaminophen   Tablet .. 650 milliGRAM(s) Oral every 6 hours PRN Temp greater or equal to 38C (100.4F)      LABS                          11.0   9.81  )-----------( 258      ( 04 Apr 2020 10:02 )             34.3     04-04    137  |  102  |  30<H>  ----------------------------<  311<H>  4.6   |  22  |  1.4    Ca    7.8<L>      04 Apr 2020 10:02  Mg     2.2     04-04    TPro  6.1  /  Alb  2.7<L>  /  TBili  0.7  /  DBili  x   /  AST  111<H>  /  ALT  81<H>  /  AlkPhos  132<H>  04-04        PTT - ( 04 Apr 2020 11:00 )  PTT:67.2 sec  Lactate Trend        CAPILLARY BLOOD GLUCOSE            RADIOLOGY & ADDITIONAL TESTS:    Imaging Personally Reviewed:  [ ] YES  [ ] NO    HEALTH ISSUES - PROBLEM Dx:        PHYSICAL EXAM:  GENERAL: NAD, well-developed  HEAD:  Atraumatic, Normocephalic  EYES: EOMI, PERRLA, conjunctiva and sclera clear  NECK: Supple, No JVD  CHEST/LUNG: bilateral rales.   HEART: Irregular rate and rhythm; S1 S2  ABDOMEN: Soft, Nontender, Nondistended; Bowel sounds present  EXTREMITIES:  2+ Peripheral Pulses, No clubbing, cyanosis, or edema  PSYCH: AAOx3  NEUROLOGY: non-focal  SKIN: No rashes or lesions

## 2020-04-04 NOTE — PROGRESS NOTE ADULT - ASSESSMENT
PROBLEMS:  I spent time examining patient, reviewing vitals, labs, medications, imaging and discussing with the team goals of care to prevent life-threatening in this patient who is at high risk for deterioration or death due to:    1.	COVID+ pneumonia, on nonrebreather - O2sat - 97% - feels better - off abx  2.	KEEGAN - improving  3.	Mild LFT elevation - observe  4.	A.Fib - on cardizem and Heparin drip  5.	anemia - observe     PLAN  Neuro: move all 4 extremities. no sensory or motor deficits  Pain management.   acetaminophen   Tablet .. 650 milliGRAM(s) Oral every 6 hours PRN    Pulm: Wean off supplemental oxygen as able. Daily CXR. Encourage coughing, deep breathing and use of incentive spirometry.     Cardio: Monitor telemetry/alarms. Continue supportive care   diltiazem Infusion 10 mG/Hr IV Continuous <Continuous>    GI: Continue stool softeners.      Nutrition: Continue present diet  Endocrine and glucose control:   atorvastatin 40 milliGRAM(s) Oral at bedtime  methylPREDNISolone sodium succinate Injectable 60 milliGRAM(s) IV Push every 12 hours    Renal: monitor urine output, supplement electrolytes as needed,     Vasc: Heparin SC and/or SCDs for DVT prophylaxis  clopidogrel Tablet 75 milliGRAM(s) Oral daily  heparin  Infusion 1000 Unit(s)/Hr IV Continuous <Continuous>    ID: Stable, no fever , no chills. Off antibiotics.    Pertinent clinical, laboratory, radiographic, hemodynamic, echocardiographic, respiratory data, microbiologic data and chart were reviewed and analyzed frequently throughout the course of the day and night. GI and DVT prophylaxis, glycemic control, head of bed elevation and skin care issues were addressed.  Patient seen, examined and plan discussed with CT Surgery / CTICU team during rounds.    [ ] The patient remains in critical and unstable condition, and requires ICU care and monitoring  [x ] The patient is improving but requires continued monitoring and adjustment of therapy

## 2020-04-04 NOTE — PROGRESS NOTE ADULT - ASSESSMENT
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 Associated with SOB and subjective fevers for the past x 1 day. Denies any sore throat, runny nose, CP, palpitations, dysuria, flank pain, LE swelling, or N/V/D. + sick contact (wife with similar symptoms). Retired. No recent travel. In the ED, Chest X-Ray with extensive B/L interstitial opacities concerning for viral pneumonia (no official read). High supplemental O2 requirements. Also, noted to have KEEGAN. COVID pcr is positive.     A/P:   Acute hypoxic respiratory failure requiring supplemental oxygen due to  SARS COV2 pneumonia with cytokine release syndrome  CXR showed diffuse bilateral interstitial and airspace opacities worsening.   COVID-19 positive. On non-rebreather. Looks stable.   Procalcitonin:  0.22<<0.22<0.28 CRP: 9>>9.63>>12.52 >>15.3 LDH: 646  D-dimer :61 fibrinogen>700; IL- 6 : 165   He completed Plaquenil. Continue Zithromax.   Started on Anakinra day#2  Air bone ,contact, droplet precautions    Atrial Fibrillation with RVR:   Continue Cardizem drip. Continue Anticoagulation with heparin drip.     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 ~ 2.1 - resolved  likely prerenal   Cr down to 1.4 fro, 4.0    CAD s/p stent.   Continue Plavix and Lipitor.     SPoke to sajan Donaldson [phone]

## 2020-04-05 LAB
ALBUMIN SERPL ELPH-MCNC: 2.8 G/DL — LOW (ref 3.5–5.2)
ALP SERPL-CCNC: 151 U/L — HIGH (ref 30–115)
ALT FLD-CCNC: 101 U/L — HIGH (ref 0–41)
ANION GAP SERPL CALC-SCNC: 13 MMOL/L — SIGNIFICANT CHANGE UP (ref 7–14)
APTT BLD: 56.2 SEC — HIGH (ref 27–39.2)
APTT BLD: 58.3 SEC — HIGH (ref 27–39.2)
APTT BLD: 66 SEC — HIGH (ref 27–39.2)
AST SERPL-CCNC: 113 U/L — HIGH (ref 0–41)
BASOPHILS # BLD AUTO: 0.01 K/UL — SIGNIFICANT CHANGE UP (ref 0–0.2)
BASOPHILS NFR BLD AUTO: 0.1 % — SIGNIFICANT CHANGE UP (ref 0–1)
BILIRUB SERPL-MCNC: 0.6 MG/DL — SIGNIFICANT CHANGE UP (ref 0.2–1.2)
BUN SERPL-MCNC: 41 MG/DL — HIGH (ref 10–20)
CALCIUM SERPL-MCNC: 8.2 MG/DL — LOW (ref 8.5–10.1)
CHLORIDE SERPL-SCNC: 99 MMOL/L — SIGNIFICANT CHANGE UP (ref 98–110)
CO2 SERPL-SCNC: 21 MMOL/L — SIGNIFICANT CHANGE UP (ref 17–32)
CREAT SERPL-MCNC: 1.7 MG/DL — HIGH (ref 0.7–1.5)
D DIMER BLD IA.RAPID-MCNC: 3765 NG/ML DDU — HIGH (ref 0–230)
EOSINOPHIL # BLD AUTO: 0 K/UL — SIGNIFICANT CHANGE UP (ref 0–0.7)
EOSINOPHIL NFR BLD AUTO: 0 % — SIGNIFICANT CHANGE UP (ref 0–8)
GLUCOSE BLDC GLUCOMTR-MCNC: 320 MG/DL — HIGH (ref 70–99)
GLUCOSE BLDC GLUCOMTR-MCNC: 374 MG/DL — HIGH (ref 70–99)
GLUCOSE BLDC GLUCOMTR-MCNC: 554 MG/DL — CRITICAL HIGH (ref 70–99)
GLUCOSE SERPL-MCNC: 395 MG/DL — HIGH (ref 70–99)
HCT VFR BLD CALC: 34.8 % — LOW (ref 42–52)
HGB BLD-MCNC: 11.4 G/DL — LOW (ref 14–18)
IMM GRANULOCYTES NFR BLD AUTO: 0.8 % — HIGH (ref 0.1–0.3)
LYMPHOCYTES # BLD AUTO: 1.28 K/UL — SIGNIFICANT CHANGE UP (ref 1.2–3.4)
LYMPHOCYTES # BLD AUTO: 7.3 % — LOW (ref 20.5–51.1)
MAGNESIUM SERPL-MCNC: 2.5 MG/DL — HIGH (ref 1.8–2.4)
MCHC RBC-ENTMCNC: 30 PG — SIGNIFICANT CHANGE UP (ref 27–31)
MCHC RBC-ENTMCNC: 32.8 G/DL — SIGNIFICANT CHANGE UP (ref 32–37)
MCV RBC AUTO: 91.6 FL — SIGNIFICANT CHANGE UP (ref 80–94)
MONOCYTES # BLD AUTO: 0.55 K/UL — SIGNIFICANT CHANGE UP (ref 0.1–0.6)
MONOCYTES NFR BLD AUTO: 3.1 % — SIGNIFICANT CHANGE UP (ref 1.7–9.3)
NEUTROPHILS # BLD AUTO: 15.51 K/UL — HIGH (ref 1.4–6.5)
NEUTROPHILS NFR BLD AUTO: 88.7 % — HIGH (ref 42.2–75.2)
NRBC # BLD: 0 /100 WBCS — SIGNIFICANT CHANGE UP (ref 0–0)
PLATELET # BLD AUTO: 271 K/UL — SIGNIFICANT CHANGE UP (ref 130–400)
POTASSIUM SERPL-MCNC: 4.8 MMOL/L — SIGNIFICANT CHANGE UP (ref 3.5–5)
POTASSIUM SERPL-SCNC: 4.8 MMOL/L — SIGNIFICANT CHANGE UP (ref 3.5–5)
PROT SERPL-MCNC: 6.3 G/DL — SIGNIFICANT CHANGE UP (ref 6–8)
RBC # BLD: 3.8 M/UL — LOW (ref 4.7–6.1)
RBC # FLD: 13.7 % — SIGNIFICANT CHANGE UP (ref 11.5–14.5)
SODIUM SERPL-SCNC: 133 MMOL/L — LOW (ref 135–146)
WBC # BLD: 17.49 K/UL — HIGH (ref 4.8–10.8)
WBC # FLD AUTO: 17.49 K/UL — HIGH (ref 4.8–10.8)

## 2020-04-05 PROCEDURE — 99233 SBSQ HOSP IP/OBS HIGH 50: CPT

## 2020-04-05 PROCEDURE — 71045 X-RAY EXAM CHEST 1 VIEW: CPT | Mod: 26

## 2020-04-05 RX ORDER — INSULIN LISPRO 100/ML
VIAL (ML) SUBCUTANEOUS
Refills: 0 | Status: DISCONTINUED | OUTPATIENT
Start: 2020-04-05 | End: 2020-04-09

## 2020-04-05 RX ORDER — DEXTROSE 50 % IN WATER 50 %
25 SYRINGE (ML) INTRAVENOUS ONCE
Refills: 0 | Status: DISCONTINUED | OUTPATIENT
Start: 2020-04-05 | End: 2020-04-05

## 2020-04-05 RX ORDER — DEXTROSE 50 % IN WATER 50 %
15 SYRINGE (ML) INTRAVENOUS ONCE
Refills: 0 | Status: DISCONTINUED | OUTPATIENT
Start: 2020-04-05 | End: 2020-04-05

## 2020-04-05 RX ORDER — DILTIAZEM HCL 120 MG
30 CAPSULE, EXT RELEASE 24 HR ORAL THREE TIMES A DAY
Refills: 0 | Status: DISCONTINUED | OUTPATIENT
Start: 2020-04-05 | End: 2020-04-09

## 2020-04-05 RX ORDER — INSULIN GLARGINE 100 [IU]/ML
20 INJECTION, SOLUTION SUBCUTANEOUS AT BEDTIME
Refills: 0 | Status: DISCONTINUED | OUTPATIENT
Start: 2020-04-05 | End: 2020-04-09

## 2020-04-05 RX ORDER — METOPROLOL TARTRATE 50 MG
25 TABLET ORAL
Refills: 0 | Status: DISCONTINUED | OUTPATIENT
Start: 2020-04-05 | End: 2020-04-08

## 2020-04-05 RX ORDER — INSULIN LISPRO 100/ML
7 VIAL (ML) SUBCUTANEOUS
Refills: 0 | Status: DISCONTINUED | OUTPATIENT
Start: 2020-04-05 | End: 2020-04-09

## 2020-04-05 RX ORDER — GLUCAGON INJECTION, SOLUTION 0.5 MG/.1ML
1 INJECTION, SOLUTION SUBCUTANEOUS ONCE
Refills: 0 | Status: DISCONTINUED | OUTPATIENT
Start: 2020-04-05 | End: 2020-04-05

## 2020-04-05 RX ORDER — FUROSEMIDE 40 MG
40 TABLET ORAL ONCE
Refills: 0 | Status: COMPLETED | OUTPATIENT
Start: 2020-04-05 | End: 2020-04-05

## 2020-04-05 RX ORDER — DEXTROSE 50 % IN WATER 50 %
12.5 SYRINGE (ML) INTRAVENOUS ONCE
Refills: 0 | Status: DISCONTINUED | OUTPATIENT
Start: 2020-04-05 | End: 2020-04-05

## 2020-04-05 RX ORDER — SODIUM CHLORIDE 9 MG/ML
1000 INJECTION, SOLUTION INTRAVENOUS
Refills: 0 | Status: DISCONTINUED | OUTPATIENT
Start: 2020-04-05 | End: 2020-04-05

## 2020-04-05 RX ADMIN — Medication 325 MILLIGRAM(S): at 17:21

## 2020-04-05 RX ADMIN — Medication 25 MILLIGRAM(S): at 17:20

## 2020-04-05 RX ADMIN — Medication 30 MILLIGRAM(S): at 12:25

## 2020-04-05 RX ADMIN — Medication 100 MILLIGRAM(S): at 00:27

## 2020-04-05 RX ADMIN — Medication 5: at 17:13

## 2020-04-05 RX ADMIN — Medication 25 MILLIGRAM(S): at 12:27

## 2020-04-05 RX ADMIN — Medication 100 MILLIGRAM(S): at 23:55

## 2020-04-05 RX ADMIN — Medication 50 MILLIGRAM(S): at 17:18

## 2020-04-05 RX ADMIN — MAGNESIUM OXIDE 400 MG ORAL TABLET 400 MILLIGRAM(S): 241.3 TABLET ORAL at 12:25

## 2020-04-05 RX ADMIN — Medication 7 UNIT(S): at 12:25

## 2020-04-05 RX ADMIN — CLOPIDOGREL BISULFATE 75 MILLIGRAM(S): 75 TABLET, FILM COATED ORAL at 12:25

## 2020-04-05 RX ADMIN — MAGNESIUM OXIDE 400 MG ORAL TABLET 400 MILLIGRAM(S): 241.3 TABLET ORAL at 08:52

## 2020-04-05 RX ADMIN — Medication 100 MILLIGRAM(S): at 13:12

## 2020-04-05 RX ADMIN — Medication 40 MILLIGRAM(S): at 12:25

## 2020-04-05 RX ADMIN — Medication 100 MILLIGRAM(S): at 17:18

## 2020-04-05 RX ADMIN — MAGNESIUM OXIDE 400 MG ORAL TABLET 400 MILLIGRAM(S): 241.3 TABLET ORAL at 17:16

## 2020-04-05 RX ADMIN — Medication 100 MILLIGRAM(S): at 05:06

## 2020-04-05 RX ADMIN — Medication 60 MILLIGRAM(S): at 05:06

## 2020-04-05 RX ADMIN — INSULIN GLARGINE 20 UNIT(S): 100 INJECTION, SOLUTION SUBCUTANEOUS at 22:08

## 2020-04-05 RX ADMIN — Medication 7 UNIT(S): at 17:10

## 2020-04-05 RX ADMIN — Medication 30 MILLIGRAM(S): at 22:08

## 2020-04-05 RX ADMIN — Medication 325 MILLIGRAM(S): at 05:07

## 2020-04-05 RX ADMIN — Medication 6: at 12:25

## 2020-04-05 NOTE — PROGRESS NOTE ADULT - SUBJECTIVE AND OBJECTIVE BOX
73 yo M w HTN, HLD, CAD s/p stent presents c/o cough x 1 week. Pt states he had low grade temp and has had slight decreased appetite, was started on an oral antibiotic by his PMD. Today was having some SOB and Jarrett was called and noted him to be hypoxic. Pt denies any chest pain, palpitations, N/V/D or abdominal pain. Wife also with URI sx. No recent travel. Pt also found to have elevated Creatinine    3/25 - COVID +  On 100 % FM    Vital Signs Last 24 Hrs  T(C): 36.3 (05 Apr 2020 21:39), Max: 36.5 (05 Apr 2020 14:41)  T(F): 97.3 (05 Apr 2020 21:39), Max: 97.7 (05 Apr 2020 14:41)  HR: 84 (05 Apr 2020 21:39) (67 - 108)  BP: 133/64 (05 Apr 2020 21:39) (127/62 - 154/68)  RR: 18 (05 Apr 2020 21:39) (18 - 18)  SpO2: 95% (05 Apr 2020 21:39) (86% - 97%)    Currently on Heparin and Cardizem gtt    Alert, awake, verbal  follows commands  S1S2 irreg rate/rhythm  Poor b/l air entry, diffuse rhonchi  soft abdomen non tender non distended  moves all 4 ext    WBC 17.5 (solumedrol)  BUIN 41, cR 1.7  elevated LFTs    CXR - diffuse b/l airspace disease.    a/p:    PNA in setting of SARS-CoV2 infection  Acute Hypoxic REsp failure  Transaminitis.  A fib with RVR    PO metoprol, titrate PRN  PO cardizem can be titrated PRN  wean off cardizem gtt  Cont with heparin gtt  Lasix 40 mg IV x 1 today, keep in negative fluid balance  Steroids IV bid  sliding scale

## 2020-04-05 NOTE — PROGRESS NOTE ADULT - ASSESSMENT
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 Associated with SOB and subjective fevers for the past x 1 day. Denies any sore throat, runny nose, CP, palpitations, dysuria, flank pain, LE swelling, or N/V/D. + sick contact (wife with similar symptoms). Retired. No recent travel. In the ED, Chest X-Ray with extensive B/L interstitial opacities concerning for viral pneumonia (no official read).. Also, noted to have KEEGAN- improving. COVID pcr is positive. Was started on plaquenil - ON Non rebreather - Patient appears stable - Worsenign CXR    #Acute hypoxic respiratory failure requiring supplemental oxygen due to  SARS COV2 pneumonia with cytokine release syndrome  - Afebrile, Slightly Tachycardic, Normotensive -   - WBC - Jumped to 17 - Likely from Steroids  - saturating  95% on NRB - Intermittently desats Improves with Deep breathing  - CXR Worsening  - COVID-19 postive; flu rsv negative  - BNP: 3422; trop: 0.04; ordered ECHO- PENDING (Patient  reports Recent Echo in [facility] that was good)  - Procalcitonin:  0.29<<0.22<0.28 CRP: 9>>9.63>>12.52 >>15.3 LDH: 646  D-dimer :61 fibrinogen>700; IL- 6 : 165   - EKG: 3/29: 432ms  - s/p plaquanil,  - Continue with  azithro  - Solumedrol decreased to 5o mg BID  - Attempt Lasix 40 mg IV to decrease Fluid  - ID consult appreciated  - Anakinra 100mg SC q6 for 3 days  - trend ferritin, fibrinogen, CRP, TAG q48h to monitor for signs of cytokine release syndrome  - Stable - Consider to stop Following  - Supportive care:  anti-tussive, antiemetics; anti pyretics  - Continue with  air bone ,contact, droplet precautions  - prone positioning as tolerated    # Afib  - Start on Metoprolol 25 BID  - Switch Cardizem to Oral 30 mg TID  - Attempt Lasix 40 mg IV to decrease Fluid    #Transaminitis- uptrending  - likely due to viral disease  - Hold Statin    #KEEGAN on 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 ~ 2.1 - resolved  - likely prerenal due to fever, decreased po intake, lasix/ Doubt ATN.  - Creatinine Trend: 1.7<1.4<1.5<, 1.9< 3.6< 4.2)  - non oliguric  - monitor BMP    #Hx of HTN, HLD, and CAD s/p stent.   - Restart home meds, hold nephrotoxic agents.   - hold statin as lfts>75    # DM - Started on Solumedrol POCT - >500  - Lantus qHS 20 / qAC Lispro 7 - Sliding Scale - Consider Increasing    # Mild HAGMA likely from elevated uremic acid levels.- resolved    DIET: DASH   DVT PPX: HEPARIN  GI PPX: not indicated   ACTIVITY: Increase as tolerated ( within room)   DIsposition: from home; Acute - Nonrebreather stable - CXR wrosening - Fluid Restict/Lasix  Updated plan with Dr. Davila, Spoke with Dr. Chowdhury

## 2020-04-05 NOTE — PROGRESS NOTE ADULT - SUBJECTIVE AND OBJECTIVE BOX
KEZIA MOYER  74y, Male  Allergy: No Known Allergies      CHIEF COMPLAINT: COVID19 (03 Apr 2020 11:42)      INTERVAL EVENTS/HPI  - No acute events overnight  - T(F): , Max: 97.4 (04-04-20 @ 13:00)  - Denies any worsening symptoms  - Tolerating medication  - WBC Count: 17.49 K/uL (04-05-20 @ 05:52)      ROS  see below   FH non-contributory   Social Hx non-contributory    VITALS:  T(F): 95.4, Max: 97.4 (04-04-20 @ 13:00)  HR: 67  BP: 154/68  RR: 18Vital Signs Last 24 Hrs  T(C): 35.2 (05 Apr 2020 05:45), Max: 36.3 (04 Apr 2020 13:00)  T(F): 95.4 (05 Apr 2020 05:45), Max: 97.4 (04 Apr 2020 13:00)  HR: 67 (05 Apr 2020 05:45) (66 - 133)  BP: 154/68 (05 Apr 2020 05:45) (132/70 - 154/68)  BP(mean): --  RR: 18 (05 Apr 2020 05:45) (18 - 18)  SpO2: 86% (05 Apr 2020 05:45) (86% - 98%)    PHYSICAL EXAM:  Gen: NAD, resting in bed  HEENT: Normocephalic, atraumatic  Neck: supple, no lymphadenopathy  CV: Regular rate & regular rhythm  Lungs: decreased BS at bases, no fremitus  Abdomen: Soft, BS present  Ext: Warm, well perfused  Neuro: non focal, awake  Skin: no rash, no erythema      TESTS & MEASUREMENTS:                        11.4   17.49 )-----------( 271      ( 05 Apr 2020 05:52 )             34.8     04-05    133<L>  |  99  |  41<H>  ----------------------------<  395<H>  4.8   |  21  |  1.7<H>    Ca    8.2<L>      05 Apr 2020 05:52  Mg     2.5     04-05    TPro  6.3  /  Alb  2.8<L>  /  TBili  0.6  /  DBili  x   /  AST  113<H>  /  ALT  101<H>  /  AlkPhos  151<H>  04-05    eGFR if Non African American: 39 mL/min/1.73M2 (04-05-20 @ 05:52)  eGFR if African American: 45 mL/min/1.73M2 (04-05-20 @ 05:52)    LIVER FUNCTIONS - ( 05 Apr 2020 05:52 )  Alb: 2.8 g/dL / Pro: 6.3 g/dL / ALK PHOS: 151 U/L / ALT: 101 U/L / AST: 113 U/L / GGT: x                     INFECTIOUS DISEASES TESTING      RADIOLOGY & ADDITIONAL TESTS:  I have personally reviewed the last Chest xray  CXR      CT      CARDIOLOGY TESTING  12 Lead ECG:   Ventricular Rate 69 BPM    Atrial Rate 69 BPM    P-R Interval 152 ms    QRS Duration 76 ms    Q-T Interval 434 ms    QTC Calculation(Bezet) 465 ms    P Axis 49 degrees    R Axis 56 degrees    T Axis 74 degrees    Diagnosis Line Normal sinus rhythm  Possible Left atrial enlargement  Nonspecific T wave abnormality  Prolonged QT  Abnormal ECG    Confirmed by Scooter Ortega ([id number]) on 4/2/2020 12:05:08 AM (04-01-20 @ 21:37)  12 Lead ECG:   Ventricular Rate 65 BPM    Atrial Rate 65 BPM    P-R Interval 166 ms    QRS Duration 76 ms    Q-T Interval 480 ms    QTC Calculation(Bezet) 499 ms    P Axis 43 degrees    R Axis 56 degrees    T Axis 69 degrees    Diagnosis Line Sinus rhythm with Premature atrial complexes  Prolonged QT  Abnormal ECG    Confirmed by Scooter Ortega [id number]) on 3/31/2020 7:30:31 PM (03-31-20 @ 17:41)      MEDICATIONS  anakinra Injectable 100  atorvastatin 40  clopidogrel Tablet 75  diltiazem Infusion 10  heparin  Infusion 1000  magnesium oxide 400  methylPREDNISolone sodium succinate Injectable 60  sodium bicarbonate 325      ANTIBIOTICS:      All available historical data has been reviewed

## 2020-04-05 NOTE — PROGRESS NOTE ADULT - SUBJECTIVE AND OBJECTIVE BOX
SUBJECTIVE:  Patient is a 74y old Male who presents with a chief complaint of COVID-19 Pneumonia (05 Apr 2020 10:07)     Currently admitted to medicine with the primary diagnosis of Pneumonia due to SARS-associated coronavirus     Today is hospital day 10d. This morning he is resting comfortably in bed and reports no new issues or overnight events. Feels Slightly Improved overall.  Patient denies Headache, Chest pain, Abdominal Pain or discomfort. - Slight SOB  Patient reports urinating and stooling appropriately.    PAST MEDICAL & SURGICAL HISTORY  CAD (coronary artery disease)  Kidney stones  High cholesterol  HTN (hypertension)  S/P drug eluting coronary stent placement: &gt; 10 years ago    SOCIAL HISTORY:  Negative for smoking/alcohol/drug use.     ALLERGIES:  No Known Allergies    MEDICATIONS:  STANDING MEDICATIONS  acetaminophen   Tablet .. 650 milliGRAM(s) Oral every 6 hours PRN Temp greater or equal to 38C (100.4F)  anakinra Injectable 100 milliGRAM(s) SubCutaneous <User Schedule>  atorvastatin 40 milliGRAM(s) Oral at bedtime  clopidogrel Tablet 75 milliGRAM(s) Oral daily  dextrose 40% Gel 15 Gram(s) Oral once PRN Blood Glucose LESS THAN 70 milliGRAM(s)/deciliter  dextrose 5%. 1000 milliLiter(s) (50 mL/Hr) IV Continuous <Continuous>  dextrose 50% Injectable 12.5 Gram(s) IV Push once  dextrose 50% Injectable 25 Gram(s) IV Push once  dextrose 50% Injectable 25 Gram(s) IV Push once  diltiazem    Tablet 30 milliGRAM(s) Oral three times a day  glucagon  Injectable 1 milliGRAM(s) IntraMuscular once PRN Glucose LESS THAN 70 milligrams/deciliter  heparin  Infusion 1000 Unit(s)/Hr (9 mL/Hr) IV Continuous <Continuous>  insulin glargine Injectable (LANTUS) 20 Unit(s) SubCutaneous at bedtime  insulin lispro (HumaLOG) corrective regimen sliding scale   SubCutaneous three times a day before meals  insulin lispro Injectable (HumaLOG) 7 Unit(s) SubCutaneous three times a day before meals  magnesium oxide 400 milliGRAM(s) Oral three times a day with meals  methylPREDNISolone sodium succinate Injectable 50 milliGRAM(s) IV Push two times a day  metoprolol tartrate 25 milliGRAM(s) Oral two times a day  sodium bicarbonate 325 milliGRAM(s) Oral two times a day    PRN MEDICATIONS  acetaminophen   Tablet .. 650 milliGRAM(s) Oral every 6 hours PRN  dextrose 40% Gel 15 Gram(s) Oral once PRN  glucagon  Injectable 1 milliGRAM(s) IntraMuscular once PRN    VITALS:   T(F): 97.7  HR: 87 (67 - 87)  BP: 133/61 (132/70 - 154/68)  RR: 18 (18 - 18)  SpO2: 92% (86% - 97%)    LABS:                        11.4   17.49 )-----------( 271      ( 05 Apr 2020 05:52 )             34.8     04-05    133<L>  |  99  |  41<H>  ----------------------------<  395<H>  4.8   |  21  |  1.7<H>    Ca    8.2<L>      05 Apr 2020 05:52  Mg     2.5     04-05    TPro  6.3  /  Alb  2.8<L>  /  TBili  0.6  /  DBili  x   /  AST  113<H>  /  ALT  101<H>  /  AlkPhos  151<H>  04-05    PTT - ( 05 Apr 2020 05:52 )  PTT:66.0 sec    RADIOLOGY:  < from: Xray Chest 1 View- PORTABLE-Routine (04.05.20 @ 04:51) >  Impression:    Diffuse bilateral pulmonary opacities, overall increasing over the past 5 days.  < end of copied text >    < from: 12 Lead ECG (04.01.20 @ 21:37) >  Ventricular Rate 69 BPM    Atrial Rate 69 BPM    P-R Interval 152 ms    QRS Duration 76 ms    Q-T Interval 434 ms    QTC Calculation(Bezet) 465 ms    P Axis 49 degrees    R Axis 56 degrees    T Axis 74 degrees    Diagnosis Line Normal sinus rhythm  Possible Left atrial enlargement  Nonspecific T wave abnormality  Prolonged QT  Abnormal ECG    < end of copied text >      PHYSICAL EXAM:  GEN: In no acute distress. Patient is awake in bed able to have a conversation.  LUNGS: CTABL, Symmetrical inspiration, no increased work of breathing - Able to Count to 8 in one breath - Desated to 88  HEART: +S1,S2, regular rate and rhythm, No murmurs, Rubs, Gallops appreciated  ABD: Bowel Sounds Present, Soft, non tender, non distended, no guarding, no rebound.   EXT: 2+ peripheral Pulses, no clubbing, no cyanosis, no Edema.   NEURO: Alert and Oriented X3. No focal deficits Appreciated. Cranial Nerves 2-12 Grossly intact. SUBJECTIVE:  Patient is a 74y old Male who presents with a chief complaint of COVID-19 Pneumonia (05 Apr 2020 10:07)     Currently admitted to medicine with the primary diagnosis of Pneumonia due to SARS-associated coronavirus     Today is hospital day 10d. This morning he is resting comfortably in bed and reports no new issues or overnight events. Feels Slightly Improved overall.  Patient denies Headache, Chest pain, Abdominal Pain or discomfort. - Slight SOB  Patient reports urinating and stooling appropriately.    PAST MEDICAL & SURGICAL HISTORY  CAD (coronary artery disease)  Kidney stones  High cholesterol  HTN (hypertension)  S/P drug eluting coronary stent placement: &gt; 10 years ago    SOCIAL HISTORY:  Negative for smoking/alcohol/drug use.     ALLERGIES:  No Known Allergies    MEDICATIONS:  STANDING MEDICATIONS  acetaminophen   Tablet .. 650 milliGRAM(s) Oral every 6 hours PRN Temp greater or equal to 38C (100.4F)  anakinra Injectable 100 milliGRAM(s) SubCutaneous <User Schedule>  atorvastatin 40 milliGRAM(s) Oral at bedtime  clopidogrel Tablet 75 milliGRAM(s) Oral daily  dextrose 40% Gel 15 Gram(s) Oral once PRN Blood Glucose LESS THAN 70 milliGRAM(s)/deciliter  dextrose 5%. 1000 milliLiter(s) (50 mL/Hr) IV Continuous <Continuous>  dextrose 50% Injectable 12.5 Gram(s) IV Push once  dextrose 50% Injectable 25 Gram(s) IV Push once  dextrose 50% Injectable 25 Gram(s) IV Push once  diltiazem    Tablet 30 milliGRAM(s) Oral three times a day  glucagon  Injectable 1 milliGRAM(s) IntraMuscular once PRN Glucose LESS THAN 70 milligrams/deciliter  heparin  Infusion 1000 Unit(s)/Hr (9 mL/Hr) IV Continuous <Continuous>  insulin glargine Injectable (LANTUS) 20 Unit(s) SubCutaneous at bedtime  insulin lispro (HumaLOG) corrective regimen sliding scale   SubCutaneous three times a day before meals  insulin lispro Injectable (HumaLOG) 7 Unit(s) SubCutaneous three times a day before meals  magnesium oxide 400 milliGRAM(s) Oral three times a day with meals  methylPREDNISolone sodium succinate Injectable 50 milliGRAM(s) IV Push two times a day  metoprolol tartrate 25 milliGRAM(s) Oral two times a day  sodium bicarbonate 325 milliGRAM(s) Oral two times a day    PRN MEDICATIONS  acetaminophen   Tablet .. 650 milliGRAM(s) Oral every 6 hours PRN  dextrose 40% Gel 15 Gram(s) Oral once PRN  glucagon  Injectable 1 milliGRAM(s) IntraMuscular once PRN    VITALS:   T(F): 97.7  HR: 87 (67 - 87)  BP: 133/61 (132/70 - 154/68)  RR: 18 (18 - 18)  SpO2: 92% (86% - 97%)    LABS:                        11.4   17.49 )-----------( 271      ( 05 Apr 2020 05:52 )             34.8     04-05    133<L>  |  99  |  41<H>  ----------------------------<  395<H>  4.8   |  21  |  1.7<H>    Ca    8.2<L>      05 Apr 2020 05:52  Mg     2.5     04-05    TPro  6.3  /  Alb  2.8<L>  /  TBili  0.6  /  DBili  x   /  AST  113<H>  /  ALT  101<H>  /  AlkPhos  151<H>  04-05    PTT - ( 05 Apr 2020 05:52 )  PTT:66.0 sec    RADIOLOGY:  < from: Xray Chest 1 View- PORTABLE-Routine (04.05.20 @ 04:51) >  Impression:    Diffuse bilateral pulmonary opacities, overall increasing over the past 5 days.  < end of copied text >    < from: 12 Lead ECG (04.01.20 @ 21:37) >  Ventricular Rate 69 BPM    Atrial Rate 69 BPM    P-R Interval 152 ms    QRS Duration 76 ms    Q-T Interval 434 ms    QTC Calculation(Bezet) 465 ms    P Axis 49 degrees    R Axis 56 degrees    T Axis 74 degrees    Diagnosis Line Normal sinus rhythm  Possible Left atrial enlargement  Nonspecific T wave abnormality  Prolonged QT  Abnormal ECG    < end of copied text >

## 2020-04-06 LAB
ALBUMIN SERPL ELPH-MCNC: 2.8 G/DL — LOW (ref 3.5–5.2)
ALP SERPL-CCNC: 145 U/L — HIGH (ref 30–115)
ALT FLD-CCNC: 115 U/L — HIGH (ref 0–41)
ANION GAP SERPL CALC-SCNC: 10 MMOL/L — SIGNIFICANT CHANGE UP (ref 7–14)
APTT BLD: 48.7 SEC — HIGH (ref 27–39.2)
APTT BLD: 56 SEC — HIGH (ref 27–39.2)
AST SERPL-CCNC: 98 U/L — HIGH (ref 0–41)
BILIRUB SERPL-MCNC: 0.6 MG/DL — SIGNIFICANT CHANGE UP (ref 0.2–1.2)
BUN SERPL-MCNC: 44 MG/DL — HIGH (ref 10–20)
CALCIUM SERPL-MCNC: 8.1 MG/DL — LOW (ref 8.5–10.1)
CHLORIDE SERPL-SCNC: 101 MMOL/L — SIGNIFICANT CHANGE UP (ref 98–110)
CO2 SERPL-SCNC: 25 MMOL/L — SIGNIFICANT CHANGE UP (ref 17–32)
CREAT SERPL-MCNC: 1.5 MG/DL — SIGNIFICANT CHANGE UP (ref 0.7–1.5)
CRP SERPL-MCNC: 4.75 MG/DL — HIGH (ref 0–0.4)
D DIMER BLD IA.RAPID-MCNC: 3590 NG/ML DDU — HIGH (ref 0–230)
ESTIMATED AVERAGE GLUCOSE: 148 MG/DL — HIGH (ref 68–114)
GLUCOSE BLDC GLUCOMTR-MCNC: 172 MG/DL — HIGH (ref 70–99)
GLUCOSE BLDC GLUCOMTR-MCNC: 202 MG/DL — HIGH (ref 70–99)
GLUCOSE BLDC GLUCOMTR-MCNC: 290 MG/DL — HIGH (ref 70–99)
GLUCOSE BLDC GLUCOMTR-MCNC: 348 MG/DL — HIGH (ref 70–99)
GLUCOSE SERPL-MCNC: 321 MG/DL — HIGH (ref 70–99)
HBA1C BLD-MCNC: 6.8 % — HIGH (ref 4–5.6)
HCT VFR BLD CALC: 32.5 % — LOW (ref 42–52)
HGB BLD-MCNC: 10.7 G/DL — LOW (ref 14–18)
MAGNESIUM SERPL-MCNC: 2.7 MG/DL — HIGH (ref 1.8–2.4)
MCHC RBC-ENTMCNC: 29.9 PG — SIGNIFICANT CHANGE UP (ref 27–31)
MCHC RBC-ENTMCNC: 32.9 G/DL — SIGNIFICANT CHANGE UP (ref 32–37)
MCV RBC AUTO: 90.8 FL — SIGNIFICANT CHANGE UP (ref 80–94)
NRBC # BLD: 0 /100 WBCS — SIGNIFICANT CHANGE UP (ref 0–0)
PLATELET # BLD AUTO: 288 K/UL — SIGNIFICANT CHANGE UP (ref 130–400)
POTASSIUM SERPL-MCNC: 5.2 MMOL/L — HIGH (ref 3.5–5)
POTASSIUM SERPL-SCNC: 5.2 MMOL/L — HIGH (ref 3.5–5)
PROCALCITONIN SERPL-MCNC: 0.15 NG/ML — HIGH (ref 0.02–0.1)
PROT SERPL-MCNC: 6 G/DL — SIGNIFICANT CHANGE UP (ref 6–8)
RBC # BLD: 3.58 M/UL — LOW (ref 4.7–6.1)
RBC # FLD: 13.7 % — SIGNIFICANT CHANGE UP (ref 11.5–14.5)
SODIUM SERPL-SCNC: 136 MMOL/L — SIGNIFICANT CHANGE UP (ref 135–146)
WBC # BLD: 15.74 K/UL — HIGH (ref 4.8–10.8)
WBC # FLD AUTO: 15.74 K/UL — HIGH (ref 4.8–10.8)

## 2020-04-06 PROCEDURE — 71045 X-RAY EXAM CHEST 1 VIEW: CPT | Mod: 26

## 2020-04-06 RX ADMIN — Medication 100 MILLIGRAM(S): at 05:53

## 2020-04-06 RX ADMIN — Medication 325 MILLIGRAM(S): at 05:51

## 2020-04-06 RX ADMIN — Medication 25 MILLIGRAM(S): at 06:02

## 2020-04-06 RX ADMIN — Medication 25 MILLIGRAM(S): at 17:16

## 2020-04-06 RX ADMIN — MAGNESIUM OXIDE 400 MG ORAL TABLET 400 MILLIGRAM(S): 241.3 TABLET ORAL at 17:15

## 2020-04-06 RX ADMIN — HEPARIN SODIUM 10 UNIT(S)/HR: 5000 INJECTION INTRAVENOUS; SUBCUTANEOUS at 23:09

## 2020-04-06 RX ADMIN — Medication 30 MILLIGRAM(S): at 21:40

## 2020-04-06 RX ADMIN — CLOPIDOGREL BISULFATE 75 MILLIGRAM(S): 75 TABLET, FILM COATED ORAL at 12:46

## 2020-04-06 RX ADMIN — Medication 3: at 12:45

## 2020-04-06 RX ADMIN — Medication 50 MILLIGRAM(S): at 05:50

## 2020-04-06 RX ADMIN — Medication 7 UNIT(S): at 07:58

## 2020-04-06 RX ADMIN — Medication 50 MILLIGRAM(S): at 17:16

## 2020-04-06 RX ADMIN — Medication 2: at 17:15

## 2020-04-06 RX ADMIN — INSULIN GLARGINE 20 UNIT(S): 100 INJECTION, SOLUTION SUBCUTANEOUS at 23:09

## 2020-04-06 RX ADMIN — Medication 30 MILLIGRAM(S): at 05:51

## 2020-04-06 RX ADMIN — Medication 7 UNIT(S): at 12:45

## 2020-04-06 RX ADMIN — Medication 30 MILLIGRAM(S): at 15:27

## 2020-04-06 RX ADMIN — MAGNESIUM OXIDE 400 MG ORAL TABLET 400 MILLIGRAM(S): 241.3 TABLET ORAL at 07:59

## 2020-04-06 RX ADMIN — MAGNESIUM OXIDE 400 MG ORAL TABLET 400 MILLIGRAM(S): 241.3 TABLET ORAL at 12:46

## 2020-04-06 RX ADMIN — Medication 4: at 07:58

## 2020-04-06 RX ADMIN — Medication 325 MILLIGRAM(S): at 17:17

## 2020-04-06 RX ADMIN — Medication 100 MILLIGRAM(S): at 12:45

## 2020-04-06 RX ADMIN — Medication 7 UNIT(S): at 17:15

## 2020-04-06 NOTE — PROGRESS NOTE ADULT - ASSESSMENT
74y old Male who presents with a chief complaint of cough x 1 week  Currently admitted to medicine with the primary diagnosis of Acute hypoxic respiratory failure secondary to SARS COv2 pneumonia    IMPRESSION:  # COVID 19 PNA    CXR with diffuse bilateral opacities , almost white out    Procalcitonin, Serum: 0.22 (04-01-20 @ 06:25)  #Cytokine Release Syndrome   Triglycerides, Serum: 88 mg/dL (04-03-20 @ 06:48)  Fibrinogen Assay: 674.0 mg/dL (04-03-20 @ 06:48)  D-Dimer Assay, Quantitative: 4079 ng/mL DDU (04-03-20 @ 06:48)  Ferritin, Serum: 997 ng/mL (04-02-20 @ 06:38)  C-Reactive Protein, Serum: 15.30 mg/dL (04-02-20 @ 06:38)  #hyponatremia    S/P Zithromax & hydroxychloroquine   On anakinra Injectable 100 (hypoxemia on NRB and d-dimer>1000, ferritin >700); IL-6 183  - WBC Count: 17.49 K/uL (on methylPREDNISolone sodium succinate Injectable ) downtrending   Cr downtrending     RECOMMENDATIONS:  Complete Anakinra 100mg SC q6h x 3 days   on steroids   monitor off abx , procalcitonin unremarkable

## 2020-04-06 NOTE — PROGRESS NOTE ADULT - SUBJECTIVE AND OBJECTIVE BOX
KEZIA MOYER 74y Male  MRN#: [id number]   CODE STATUS:____FULL____      SUBJECTIVE  Patient is a 74y old Male who presents with a chief complaint of SOB (06 Apr 2020 09:28)  Currently admitted to medicine with the primary diagnosis of Pneumonia due to SARS-associated coronavirus    Satting 91% on NRB.  Afebrile.  No c/o SOB, not tachypneic or tachycardic    OBJECTIVE  PAST MEDICAL & SURGICAL HISTORY  CAD (coronary artery disease)  Kidney stones  High cholesterol  HTN (hypertension)  S/P drug eluting coronary stent placement: &gt; 10 years ago    ALLERGIES:  No Known Allergies    MEDICATIONS:  STANDING MEDICATIONS  clopidogrel Tablet 75 milliGRAM(s) Oral daily  diltiazem    Tablet 30 milliGRAM(s) Oral three times a day  heparin  Infusion 1000 Unit(s)/Hr IV Continuous <Continuous>  insulin glargine Injectable (LANTUS) 20 Unit(s) SubCutaneous at bedtime  insulin lispro (HumaLOG) corrective regimen sliding scale   SubCutaneous three times a day before meals  insulin lispro Injectable (HumaLOG) 7 Unit(s) SubCutaneous three times a day before meals  magnesium oxide 400 milliGRAM(s) Oral three times a day with meals  methylPREDNISolone sodium succinate Injectable 50 milliGRAM(s) IV Push two times a day  metoprolol tartrate 25 milliGRAM(s) Oral two times a day  sodium bicarbonate 325 milliGRAM(s) Oral two times a day    PRN MEDICATIONS  acetaminophen   Tablet .. 650 milliGRAM(s) Oral every 6 hours PRN      VITAL SIGNS: Last 24 Hours  T(C): 35.9 (06 Apr 2020 04:13), Max: 36.5 (05 Apr 2020 14:41)  T(F): 96.7 (06 Apr 2020 04:13), Max: 97.7 (05 Apr 2020 14:41)  HR: 94 (06 Apr 2020 04:13) (84 - 108)  BP: 132/67 (06 Apr 2020 04:13) (127/62 - 133/64)  BP(mean): --  RR: 18 (06 Apr 2020 04:13) (18 - 18)  SpO2: 96% (06 Apr 2020 10:00) (84% - 97%)    LABS:                        10.7   15.74 )-----------( 288      ( 06 Apr 2020 05:42 )             32.5     04-06    136  |  101  |  44<H>  ----------------------------<  321<H>  5.2<H>   |  25  |  1.5    Ca    8.1<L>      06 Apr 2020 05:42  Mg     2.7     04-06    TPro  6.0  /  Alb  2.8<L>  /  TBili  0.6  /  DBili  x   /  AST  98<H>  /  ALT  115<H>  /  AlkPhos  145<H>  04-06    PTT - ( 06 Apr 2020 05:42 )  PTT:56.0 sec              RADIOLOGY:      PHYSICAL EXAM:    GENERAL: NAD, proned position, on NRB  HEENT:  Atraumatic, Normocephalic. EOMI, PERRLA, conjunctiva and sclera clear, No JVD  PULMONARY: poor air entry b/l, no wheezing/rhonchi/rales  CARDIOVASCULAR: Regular rate and rhythm; No murmurs, rubs, or gallops  GASTROINTESTINAL: Soft, Nontender, Nondistended; Bowel sounds present  MUSCULOSKELETAL:  2+ Peripheral Pulses, No clubbing, cyanosis, or edema  NEUROLOGY: non-focal  SKIN: No rashes or lesions      ADMISSION SUMMARY  Patient is a 74y old Male who presents with a chief complaint of SOB (06 Apr 2020 09:28)  Currently admitted to medicine with the primary diagnosis of Pneumonia due to SARS-associated coronavirus      ASSESSMENT & PLAN    75 YO M with a PMH of HTN, HLD, CAD s/p stent, and 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  In the ED, XCR with extensive B/L interstitial opacities concerning for viral pneumonia (no official read).  Also with KEEGAN. COVID pcr is positive. Was started on plaquenil - ON Non rebreather - Patient appears stable - Worsenign CXR    #Acute hypoxic respiratory failure requiring supplemental oxygen due to  SARS COV2 pneumonia with cytokine release syndrome  - Afebrile, Slightly Tachycardic, Normotensive -   - WBC - Jumped to 17 - Likely from Steroids  - saturating  95% on NRB - Intermittently desats Improves with Deep breathing  - CXR Worsening  - COVID-19 postive; flu rsv negative  - BNP: 3422; trop: 0.04; ordered ECHO- PENDING (Patient  reports Recent Echo in [facility] that was good)  - Procalcitonin:  0.29<<0.22<0.28 CRP: 9>>9.63>>12.52 >>15.3 LDH: 646  D-dimer :61 fibrinogen>700; IL- 6 : 165   - EKG: 3/29: 432ms  - s/p plaquanil,  - Continue with  azithro  - Solumedrol decreased to 5o mg BID  - Attempt Lasix 40 mg IV to decrease Fluid  - ID consult appreciated  - Anakinra 100mg SC q6 for 3 days  - trend ferritin, fibrinogen, CRP, TAG q48h to monitor for signs of cytokine release syndrome  - Stable - Consider to stop Following  - Supportive care:  anti-tussive, antiemetics; anti pyretics  - Continue with  air bone ,contact, droplet precautions  - prone positioning as tolerated    # Afib  - Start on Metoprolol 25 BID  - Switch Cardizem to Oral 30 mg TID  - Attempt Lasix 40 mg IV to decrease Fluid    #Transaminitis- uptrending  - likely due to viral disease  - Hold Statin    #KEEGAN on 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 ~ 2.1 - resolved  - likely prerenal due to fever, decreased po intake, lasix/ Doubt ATN.  - Creatinine Trend: 1.7<1.4<1.5<, 1.9< 3.6< 4.2)  - non oliguric  - monitor BMP    #Hx of HTN, HLD, and CAD s/p stent.   - Restart home meds, hold nephrotoxic agents.   - hold statin as lfts>75    # DM - Started on Solumedrol POCT - >500  - Lantus qHS 20 / qAC Lispro 7 - Sliding Scale - Consider Increasing    # Mild HAGMA likely from elevated uremic acid levels.- resolved    DIET: DASH   DVT PPX: HEPARIN  GI PPX: not indicated   ACTIVITY: Increase as tolerated ( within room)   DIsposition: from home; Acute - Nonrebreather stable - CXR wrosening - Fluid Restict/Lasix  Updated plan with Dr. Davila, Spoke with Dr. Chowdhury KEZIA MOYER 74y Male  MRN#: [id number]   CODE STATUS:____FULL____      SUBJECTIVE  Patient is a 74y old Male who presents with a chief complaint of SOB (06 Apr 2020 09:28)  Currently admitted to medicine with the primary diagnosis of Pneumonia due to SARS-associated coronavirus    Satting 91% on NRB.  Afebrile.  No c/o SOB, not tachypneic or tachycardic    OBJECTIVE  PAST MEDICAL & SURGICAL HISTORY  CAD (coronary artery disease)  Kidney stones  High cholesterol  HTN (hypertension)  S/P drug eluting coronary stent placement: &gt; 10 years ago    ALLERGIES:  No Known Allergies    MEDICATIONS:  STANDING MEDICATIONS  clopidogrel Tablet 75 milliGRAM(s) Oral daily  diltiazem    Tablet 30 milliGRAM(s) Oral three times a day  heparin  Infusion 1000 Unit(s)/Hr IV Continuous <Continuous>  insulin glargine Injectable (LANTUS) 20 Unit(s) SubCutaneous at bedtime  insulin lispro (HumaLOG) corrective regimen sliding scale   SubCutaneous three times a day before meals  insulin lispro Injectable (HumaLOG) 7 Unit(s) SubCutaneous three times a day before meals  magnesium oxide 400 milliGRAM(s) Oral three times a day with meals  methylPREDNISolone sodium succinate Injectable 50 milliGRAM(s) IV Push two times a day  metoprolol tartrate 25 milliGRAM(s) Oral two times a day  sodium bicarbonate 325 milliGRAM(s) Oral two times a day    PRN MEDICATIONS  acetaminophen   Tablet .. 650 milliGRAM(s) Oral every 6 hours PRN      VITAL SIGNS: Last 24 Hours  T(C): 35.9 (06 Apr 2020 04:13), Max: 36.5 (05 Apr 2020 14:41)  T(F): 96.7 (06 Apr 2020 04:13), Max: 97.7 (05 Apr 2020 14:41)  HR: 94 (06 Apr 2020 04:13) (84 - 108)  BP: 132/67 (06 Apr 2020 04:13) (127/62 - 133/64)  BP(mean): --  RR: 18 (06 Apr 2020 04:13) (18 - 18)  SpO2: 96% (06 Apr 2020 10:00) (84% - 97%)    LABS:                        10.7   15.74 )-----------( 288      ( 06 Apr 2020 05:42 )             32.5     04-06    136  |  101  |  44<H>  ----------------------------<  321<H>  5.2<H>   |  25  |  1.5    Ca    8.1<L>      06 Apr 2020 05:42  Mg     2.7     04-06    TPro  6.0  /  Alb  2.8<L>  /  TBili  0.6  /  DBili  x   /  AST  98<H>  /  ALT  115<H>  /  AlkPhos  145<H>  04-06    PTT - ( 06 Apr 2020 05:42 )  PTT:56.0 sec              RADIOLOGY:      PHYSICAL EXAM:    GENERAL: NAD, proned position, on NRB  HEENT:  Atraumatic, Normocephalic. EOMI, PERRLA, conjunctiva and sclera clear, No JVD  PULMONARY: poor air entry b/l, no wheezing/rhonchi/rales  CARDIOVASCULAR: Regular rate and rhythm; No murmurs, rubs, or gallops  GASTROINTESTINAL: Soft, Nontender, Nondistended; Bowel sounds present  MUSCULOSKELETAL:  2+ Peripheral Pulses, No clubbing, cyanosis, or edema  NEUROLOGY: non-focal  SKIN: No rashes or lesions      ADMISSION SUMMARY  Patient is a 74y old Male who presents with a chief complaint of SOB (06 Apr 2020 09:28)  Currently admitted to medicine with the primary diagnosis of Pneumonia due to SARS-associated coronavirus      ASSESSMENT & PLAN    75 YO M with a PMH of HTN, HLD, CAD s/p stent, and 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  In the ED, hypoxic and put on 5L NC.  CXR with extensive B/L interstitial opacities concerning for viral pneumonia.  Admitted for suspected COVID-19 infection.  Patient upgraded to the ICU on 4/3 for increasing oxygen requirements on 100% NRB and increased work of breathing.       #Acute hypoxic respiratory failure 2/2 SARS COV2 pneumonia with cytokine release syndrome  -Covid-19 +  -afebrile, BP stable, not tachy  -currently satting 91% on NRB  -sats improve with prone positioning- continue proning  -low threshold for intubation; discussed with patient and he is aware (would like Dr. Davila and Kleiner to be notified to make the decision before intubation)  -CXR with diffuse b/l opacities (slightly improved on xray today)  -ID following  -s/p plaquenil, azithromycin, and anakinra  -cont solumedrol 50 q12 (started 4/5)  -crp 13, procal .25  -LDH, ferritin, d-dimer elevated  -keep neutral balance    #Afib with RVR  -rate controlled  -cardizem 30 mg TID  -lopressor 25 mg BID  -A/C with heparin, monitor PTTs    #KEEGAN on CKD3/4- resolved  -baseline Cr 2  -Cr 1.5 today, continue to monitor  -non oliguric  -monitor BMP  -avoid nephrotoxins and hypotension  -follows with Dr. Kleiner    #HTN  -cont metoprolol    #CAD s/p stent  -cont plavix, metoprolol    #DLD  -holding statin as LFTs>100    # DM  -monitor fs  -sugars elevated  -increasing insulin regimen    Diet clears  DVT ppx heparin  Dispo- acute   Updated plan with Dr. Davila

## 2020-04-06 NOTE — PROGRESS NOTE ADULT - ASSESSMENT
IMPRESSION:    Acute Hypoxemic Respiratory Failure   COVID19 PNA  Afib with RVR    PLAN:    CNS: Avoid CNS depressants.    HEENT: Oral care    PULMONARY:  HOB @ 45 degrees. Aspiration Precautions . CXR in am . Supplemental oxygen to keep sat 92-94%, wean from NRB to Face mask 50 %    CARDIOVASCULAR: Avoid volume overload . Keep I=O, rate control    GI: GI prophylaxis.  Feeding clears     RENAL:  Follow up lytes.  Correct as needed    INFECTIOUS DISEASE: Follow up cultures. ABX as per ID     HEMATOLOGICAL:  DVT prophylaxis.    ENDOCRINE:  Follow up FS.  Insulin protocol if needed.    MUSCULOSKELETAL: Bedrest    Lines: Peripheral IV  Code status: Full code  Disposition: MICU monitoring

## 2020-04-06 NOTE — CHART NOTE - NSCHARTNOTEFT_GEN_A_CORE
Registered Dietitian Follow-Up     Patient Profile Reviewed                           Yes [x]   No []     Nutrition History Previously Obtained        Yes [x]  No []       Pertinent Subjective Information:  -Spoke to RN who notes diet changed to clear liquids today but unsure why. Called [facility]IP via extension [phone] and spoke to covering resident as Dr. Carlson n/a at this time, but he is also unaware of dietary adjustment. Pt has been recommended to be in prone position as much as tolerated to alleviate symptoms, turn supine at meal times. Per RN, pt was consuming ~50% at best when solid food provided and previous RD supplement recs remain pending. Updated recommendations at end of document d/w LIP covering (Dr. Ferrara).      Pertinent Medical Interventions:  1. Acute hypoxic respiratory failure 2/2 COVID-19 PNA with cytokine release syndrome  --upgraded to the ICU on 4/3 d/t increasing oxygen requirements on 100% NRB and increased work of breathing.  currently satting 91% on NRB  --afebrile, BP stable, not tachy. prone position as tolerated. low threshold for intubation   --b/l opacities slightly improved on CXR today   --s/p plaquenil, azithromycin, and anakinra  2. Afib with RVR, rate controlled   3. KEEGAN, resolved. h/o CKD III/IV      Diet order: Clear liquids (4/6) previously DASH/TLC      Anthropometrics:  - Ht. 170.18cm   - Wt. 97.8kg (4/2) vs 82kg stated wt on RD initial assessment. large discrepancy but will use current bedscale dosing wt for estimated needs   - BMI 33.8   - IBW 67.2kg      Pertinent Lab Data: (4/6/2020) WBC 15.74, RBC 3.58, H/H 10.7/32.5, POCT 348/290, glucose 321, HbA1c 6.8%, K+ 5.2, BUN 44, corrected Ca 9.06, GFR 46, Mg 2.7      Pertinent Meds: heparin, plavix, lantus, humalog, cardizem, solumedrol, metoprolol, mag-ox, sodium bicarbonate     Physical Findings:  - Appearance: AAO   - GI function: constipation improved with BM 4/4. soft abdomen noted   - Tubes: N/A   - Oral/Mouth cavity: none reported   - Skin: intact. 1+ edema b/l ankles      Nutrition Requirements  Weight Used: adjusted as CBW obtained. 97.8kg bedscale dosing wt; 67.2kg IBW      Estimated Energy Needs   Adjust [x] (25-30 kcal/kg IBW d/t BMI >30)   Adjusted Energy Recommendations:  7199-1211  kcal/day *aim toward lower end of range considering age and limited activity*      Estimated Protein Needs   Adjust [x]  (1.1-1.3 g/kg IBW)   Adjusted Protein Recommendations:  74-87 gm/day     Estimated Fluid Needs       continue: 1mL/kcal      Nutrient Intake: not meeting needs d/t nutritionally insufficient diet order vs poor intake when solid foods provided      [x] Previous Nutrition Diagnosis: inadequate oral intake             [x] Ongoing          [] Resolved    Nutrition Intervention: meals and snacks, medical food supplements  Recommend:  1. Adv diet as tolerated to DASH/TLC, CHO Consistent. Kosher per pt request per RD initial assessment.  2. Add glucerna TID.   3. Consider SLP eval if pt demonstrates difficulty swallowing.   4. consider daily bowel regimen for more regular bowel pattern.      Goal/Expected Outcome: Pt to consume/tolerate >50% meals and supplements upon f/u in 3 days.      Indicator/Monitoring: RD to monitor energy intake, diet order body comp, NFPF.

## 2020-04-06 NOTE — PROGRESS NOTE ADULT - SUBJECTIVE AND OBJECTIVE BOX
Patient is a 74y old  Male who presents with a chief complaint of cough for 1 wk (05 Apr 2020 22:25)    Over Night Events: No acute events overnight         ROS:  See HPI    PHYSICAL EXAM    ICU Vital Signs Last 24 Hrs  T(C): 35.9 (06 Apr 2020 04:13), Max: 36.5 (05 Apr 2020 14:41)  T(F): 96.7 (06 Apr 2020 04:13), Max: 97.7 (05 Apr 2020 14:41)  HR: 94 (06 Apr 2020 04:13) (84 - 108)  BP: 132/67 (06 Apr 2020 04:13) (127/62 - 133/64)  BP(mean): --  ABP: --  ABP(mean): --  RR: 18 (06 Apr 2020 04:13) (18 - 18)  SpO2: 94% (06 Apr 2020 04:13) (92% - 97%)      General: NAD  HEENT: HU             Lungs: Bilateral BS  Cardiovascular: Regular   Gastrointestinal: Soft, Positive BS  Extremities: No clubbing.  Moves extremities.   Skin: Warm, intact  Neurological: No motor or sensory deficit       04-05-20 @ 07:01  -  04-06-20 @ 07:00  --------------------------------------------------------  IN:    diltiazem Infusion: 60 mL    heparin Infusion: 189 mL  Total IN: 249 mL    OUT:    Voided: 1025 mL  Total OUT: 1025 mL    Total NET: -776 mL          LABS:                            10.7   15.74 )-----------( 288      ( 06 Apr 2020 05:42 )             32.5                                                 04-06               10.7   15.74 )-----------( 288      ( 04-06 @ 05:42 )             32.5                11.4   17.49 )-----------( 271      ( 04-05 @ 05:52 )             34.8                11.0   9.81  )-----------( 258      ( 04-04 @ 10:02 )             34.3       136  |  101  |  44<H>  ----------------------------<  321<H>  5.2<H>   |  25  |  1.5    06 Apr 2020 05:42    136    |  101    |  44<H>  ----------------------------<  321<H>  5.2<H>   |  25     |  1.5    05 Apr 2020 05:52    133<L>  |  99     |  41<H>  ----------------------------<  395<H>  4.8     |  21     |  1.7<H>    Ca    8.1<L>      06 Apr 2020 05:42  Ca    8.2<L>      05 Apr 2020 05:52  Mg     2.7<H>     06 Apr 2020 05:42  Mg     2.5<H>     05 Apr 2020 05:52    TPro  6.0    /  Alb  2.8<L>  /  TBili  0.6    /  DBili  x      /  AST  98<H>  /  ALT  115<H>  /  AlkPhos  145<H>  06 Apr 2020 05:42  TPro  6.3    /  Alb  2.8<L>  /  TBili  0.6    /  DBili  x      /  AST  113<H>  /  ALT  101<H>  /  AlkPhos  151<H>  05 Apr 2020 05:52    Ca    8.1<L>      06 Apr 2020 05:42  Mg     2.7     04-06    TPro  6.0  /  Alb  2.8<L>  /  TBili  0.6  /  DBili  x   /  AST  98<H>  /  ALT  115<H>  /  AlkPhos  145<H>  04-06      PTT - ( 06 Apr 2020 05:42 )  PTT:56.0 sec                                                                                     LIVER FUNCTIONS - ( 06 Apr 2020 05:42 )  Alb: 2.8 g/dL / Pro: 6.0 g/dL / ALK PHOS: 145 U/L / ALT: 115 U/L / AST: 98 U/L / GGT: x                                                                                                                                       MEDICATIONS  (STANDING):  anakinra Injectable 100 milliGRAM(s) SubCutaneous <User Schedule>  clopidogrel Tablet 75 milliGRAM(s) Oral daily  diltiazem    Tablet 30 milliGRAM(s) Oral three times a day  heparin  Infusion 1000 Unit(s)/Hr (9 mL/Hr) IV Continuous <Continuous>  insulin glargine Injectable (LANTUS) 20 Unit(s) SubCutaneous at bedtime  insulin lispro (HumaLOG) corrective regimen sliding scale   SubCutaneous three times a day before meals  insulin lispro Injectable (HumaLOG) 7 Unit(s) SubCutaneous three times a day before meals  magnesium oxide 400 milliGRAM(s) Oral three times a day with meals  methylPREDNISolone sodium succinate Injectable 50 milliGRAM(s) IV Push two times a day  metoprolol tartrate 25 milliGRAM(s) Oral two times a day  sodium bicarbonate 325 milliGRAM(s) Oral two times a day    MEDICATIONS  (PRN):  acetaminophen   Tablet .. 650 milliGRAM(s) Oral every 6 hours PRN Temp greater or equal to 38C (100.4F)      Xrays:     B/L LOWER LOBE INFILTRATES , F/U OFFICIAL READ                                                                                ECHO NOT DONE

## 2020-04-06 NOTE — PROGRESS NOTE ADULT - SUBJECTIVE AND OBJECTIVE BOX
KEZIA MOYER  74y, Male  Allergy: No Known Allergies      LOS  11d    CHIEF COMPLAINT: SOB (06 Apr 2020 09:28)      INTERVAL EVENTS/HPI  - No acute events overnight on NRB  - T(F): , Max: 97.7 (04-05-20 @ 14:41)  - WBC Count: 15.74 (04-06-20 @ 05:42) downtrending   WBC Count: 17.49 (04-05-20 @ 05:52)  - Creatinine, Serum: 1.5 (04-06-20 @ 05:42) downtrending   Creatinine, Serum: 1.7 (04-05-20 @ 05:52)       VITALS:  T(F): 96.7, Max: 97.7 (04-05-20 @ 14:41)  HR: 94  BP: 132/67  RR: 18Vital Signs Last 24 Hrs  T(C): 35.9 (06 Apr 2020 04:13), Max: 36.5 (05 Apr 2020 14:41)  T(F): 96.7 (06 Apr 2020 04:13), Max: 97.7 (05 Apr 2020 14:41)  HR: 94 (06 Apr 2020 04:13) (84 - 108)  BP: 132/67 (06 Apr 2020 04:13) (127/62 - 133/64)  BP(mean): --  RR: 18 (06 Apr 2020 04:13) (18 - 18)  SpO2: 94% (06 Apr 2020 04:13) (92% - 97%)    PHYSICAL EXAM:  Gen: on NRB  HEENT: NCAT  Resp: b/l chest expansion  Neuro: nonfocal     FH: Non-contributory  Social Hx: Non-contributory    TESTS & MEASUREMENTS:                        10.7   15.74 )-----------( 288      ( 06 Apr 2020 05:42 )             32.5     04-06    136  |  101  |  44<H>  ----------------------------<  321<H>  5.2<H>   |  25  |  1.5    Ca    8.1<L>      06 Apr 2020 05:42  Mg     2.7     04-06    TPro  6.0  /  Alb  2.8<L>  /  TBili  0.6  /  DBili  x   /  AST  98<H>  /  ALT  115<H>  /  AlkPhos  145<H>  04-06    eGFR if Non African American: 46 mL/min/1.73M2 (04-06-20 @ 05:42)  eGFR if African American: 53 mL/min/1.73M2 (04-06-20 @ 05:42)    LIVER FUNCTIONS - ( 06 Apr 2020 05:42 )  Alb: 2.8 g/dL / Pro: 6.0 g/dL / ALK PHOS: 145 U/L / ALT: 115 U/L / AST: 98 U/L / GGT: x                     INFECTIOUS DISEASES TESTING  Procalcitonin, Serum: 0.25 (04-04-20 @ 10:02)  Procalcitonin, Serum: 0.29 (04-03-20 @ 06:48)  Procalcitonin, Serum: 0.22 (04-01-20 @ 06:25)  Procalcitonin, Serum: 0.22 (03-31-20 @ 04:30)  Procalcitonin, Serum: 0.28 (03-26-20 @ 06:10)  COVID-19 PCR: Detected (03-25-20 @ 23:27)  Flu B Result: Negative (03-25-20 @ 23:16)  RSV Result: Negative (03-25-20 @ 23:16)  Flu A Result: Negative (03-25-20 @ 23:16)      INFLAMMATORY MARKERS  C-Reactive Protein, Serum: 13.35 mg/dL (04-04-20 @ 10:02)  C-Reactive Protein, Serum: 14.57 mg/dL (04-03-20 @ 06:48)  C-Reactive Protein, Serum: 15.30 mg/dL (04-02-20 @ 06:38)  C-Reactive Protein, Serum: 12.15 mg/dL (04-01-20 @ 06:25)      RADIOLOGY & ADDITIONAL TESTS:  I have personally reviewed the last available Chest xray  CXR      CT      CARDIOLOGY TESTING  12 Lead ECG:   Ventricular Rate 69 BPM    Atrial Rate 69 BPM    P-R Interval 152 ms    QRS Duration 76 ms    Q-T Interval 434 ms    QTC Calculation(Bezet) 465 ms    P Axis 49 degrees    R Axis 56 degrees    T Axis 74 degrees    Diagnosis Line Normal sinus rhythm  Possible Left atrial enlargement  Nonspecific T wave abnormality  Prolonged QT  Abnormal ECG    Confirmed by Scooter Ortega ([id number]) on 4/2/2020 12:05:08 AM (04-01-20 @ 21:37)  12 Lead ECG:   Ventricular Rate 65 BPM    Atrial Rate 65 BPM    P-R Interval 166 ms    QRS Duration 76 ms    Q-T Interval 480 ms    QTC Calculation(Bezet) 499 ms    P Axis 43 degrees    R Axis 56 degrees    T Axis 69 degrees    Diagnosis Line Sinus rhythm with Premature atrial complexes  Prolonged QT  Abnormal ECG    Confirmed by Scooter Ortega ([id number]) on 3/31/2020 7:30:31 PM (03-31-20 @ 17:41)      MEDICATIONS  anakinra Injectable 100  clopidogrel Tablet 75  diltiazem    Tablet 30  heparin  Infusion 1000  insulin glargine Injectable (LANTUS) 20  insulin lispro (HumaLOG) corrective regimen sliding scale   insulin lispro Injectable (HumaLOG) 7  magnesium oxide 400  methylPREDNISolone sodium succinate Injectable 50  metoprolol tartrate 25  sodium bicarbonate 325      WEIGHT  Weight (kg): 97.8 (04-02-20 @ 14:43)  Creatinine, Serum: 1.5 mg/dL (04-06-20 @ 05:42)      ANTIBIOTICS:      All available historical records have been reviewed

## 2020-04-07 LAB
ALBUMIN SERPL ELPH-MCNC: 2.8 G/DL — LOW (ref 3.5–5.2)
ALP SERPL-CCNC: 158 U/L — HIGH (ref 30–115)
ALT FLD-CCNC: 112 U/L — HIGH (ref 0–41)
ANION GAP SERPL CALC-SCNC: 14 MMOL/L — SIGNIFICANT CHANGE UP (ref 7–14)
APTT BLD: 53.6 SEC — HIGH (ref 27–39.2)
APTT BLD: 54 SEC — HIGH (ref 27–39.2)
APTT BLD: 70.7 SEC — CRITICAL HIGH (ref 27–39.2)
AST SERPL-CCNC: 93 U/L — HIGH (ref 0–41)
BASOPHILS # BLD AUTO: 0.01 K/UL — SIGNIFICANT CHANGE UP (ref 0–0.2)
BASOPHILS NFR BLD AUTO: 0.1 % — SIGNIFICANT CHANGE UP (ref 0–1)
BILIRUB SERPL-MCNC: 0.8 MG/DL — SIGNIFICANT CHANGE UP (ref 0.2–1.2)
BUN SERPL-MCNC: 42 MG/DL — HIGH (ref 10–20)
CALCIUM SERPL-MCNC: 8 MG/DL — LOW (ref 8.5–10.1)
CHLORIDE SERPL-SCNC: 101 MMOL/L — SIGNIFICANT CHANGE UP (ref 98–110)
CO2 SERPL-SCNC: 26 MMOL/L — SIGNIFICANT CHANGE UP (ref 17–32)
CREAT SERPL-MCNC: 1.6 MG/DL — HIGH (ref 0.7–1.5)
CRP SERPL-MCNC: 3.76 MG/DL — HIGH (ref 0–0.4)
EOSINOPHIL # BLD AUTO: 0 K/UL — SIGNIFICANT CHANGE UP (ref 0–0.7)
EOSINOPHIL NFR BLD AUTO: 0 % — SIGNIFICANT CHANGE UP (ref 0–8)
FERRITIN SERPL-MCNC: 1322 NG/ML — HIGH (ref 30–400)
GAS PNL BLDA: SIGNIFICANT CHANGE UP
GLUCOSE BLDC GLUCOMTR-MCNC: 141 MG/DL — HIGH (ref 70–99)
GLUCOSE BLDC GLUCOMTR-MCNC: 210 MG/DL — HIGH (ref 70–99)
GLUCOSE BLDC GLUCOMTR-MCNC: 230 MG/DL — HIGH (ref 70–99)
GLUCOSE BLDC GLUCOMTR-MCNC: 263 MG/DL — HIGH (ref 70–99)
GLUCOSE SERPL-MCNC: 234 MG/DL — HIGH (ref 70–99)
HCT VFR BLD CALC: 35.6 % — LOW (ref 42–52)
HGB BLD-MCNC: 11.2 G/DL — LOW (ref 14–18)
IMM GRANULOCYTES NFR BLD AUTO: 1.5 % — HIGH (ref 0.1–0.3)
LYMPHOCYTES # BLD AUTO: 1.03 K/UL — LOW (ref 1.2–3.4)
LYMPHOCYTES # BLD AUTO: 6.3 % — LOW (ref 20.5–51.1)
MCHC RBC-ENTMCNC: 28.6 PG — SIGNIFICANT CHANGE UP (ref 27–31)
MCHC RBC-ENTMCNC: 31.5 G/DL — LOW (ref 32–37)
MCV RBC AUTO: 90.8 FL — SIGNIFICANT CHANGE UP (ref 80–94)
MONOCYTES # BLD AUTO: 0.79 K/UL — HIGH (ref 0.1–0.6)
MONOCYTES NFR BLD AUTO: 4.8 % — SIGNIFICANT CHANGE UP (ref 1.7–9.3)
NEUTROPHILS # BLD AUTO: 14.29 K/UL — HIGH (ref 1.4–6.5)
NEUTROPHILS NFR BLD AUTO: 87.3 % — HIGH (ref 42.2–75.2)
NRBC # BLD: 0 /100 WBCS — SIGNIFICANT CHANGE UP (ref 0–0)
PLATELET # BLD AUTO: 364 K/UL — SIGNIFICANT CHANGE UP (ref 130–400)
POTASSIUM SERPL-MCNC: 4.4 MMOL/L — SIGNIFICANT CHANGE UP (ref 3.5–5)
POTASSIUM SERPL-SCNC: 4.4 MMOL/L — SIGNIFICANT CHANGE UP (ref 3.5–5)
PROCALCITONIN SERPL-MCNC: 0.14 NG/ML — HIGH (ref 0.02–0.1)
PROT SERPL-MCNC: 6.1 G/DL — SIGNIFICANT CHANGE UP (ref 6–8)
RBC # BLD: 3.92 M/UL — LOW (ref 4.7–6.1)
RBC # FLD: 13.8 % — SIGNIFICANT CHANGE UP (ref 11.5–14.5)
SODIUM SERPL-SCNC: 141 MMOL/L — SIGNIFICANT CHANGE UP (ref 135–146)
WBC # BLD: 16.36 K/UL — HIGH (ref 4.8–10.8)
WBC # FLD AUTO: 16.36 K/UL — HIGH (ref 4.8–10.8)

## 2020-04-07 PROCEDURE — 76937 US GUIDE VASCULAR ACCESS: CPT | Mod: 26,CS

## 2020-04-07 PROCEDURE — 36556 INSERT NON-TUNNEL CV CATH: CPT | Mod: CS

## 2020-04-07 PROCEDURE — 71045 X-RAY EXAM CHEST 1 VIEW: CPT | Mod: 26,77

## 2020-04-07 PROCEDURE — 71045 X-RAY EXAM CHEST 1 VIEW: CPT | Mod: 26

## 2020-04-07 RX ORDER — CHLORHEXIDINE GLUCONATE 213 G/1000ML
15 SOLUTION TOPICAL EVERY 12 HOURS
Refills: 0 | Status: DISCONTINUED | OUTPATIENT
Start: 2020-04-07 | End: 2020-04-16

## 2020-04-07 RX ORDER — MIDAZOLAM HYDROCHLORIDE 1 MG/ML
0.02 INJECTION, SOLUTION INTRAMUSCULAR; INTRAVENOUS
Qty: 100 | Refills: 0 | Status: DISCONTINUED | OUTPATIENT
Start: 2020-04-07 | End: 2020-04-12

## 2020-04-07 RX ORDER — MORPHINE SULFATE 50 MG/1
2 CAPSULE, EXTENDED RELEASE ORAL
Qty: 100 | Refills: 0 | Status: DISCONTINUED | OUTPATIENT
Start: 2020-04-07 | End: 2020-04-10

## 2020-04-07 RX ORDER — CEFEPIME 1 G/1
2000 INJECTION, POWDER, FOR SOLUTION INTRAMUSCULAR; INTRAVENOUS EVERY 12 HOURS
Refills: 0 | Status: DISCONTINUED | OUTPATIENT
Start: 2020-04-07 | End: 2020-04-09

## 2020-04-07 RX ADMIN — Medication 50 MILLIGRAM(S): at 16:56

## 2020-04-07 RX ADMIN — CHLORHEXIDINE GLUCONATE 15 MILLILITER(S): 213 SOLUTION TOPICAL at 18:07

## 2020-04-07 RX ADMIN — Medication 7 UNIT(S): at 08:23

## 2020-04-07 RX ADMIN — MORPHINE SULFATE 2 MG/HR: 50 CAPSULE, EXTENDED RELEASE ORAL at 11:42

## 2020-04-07 RX ADMIN — Medication 325 MILLIGRAM(S): at 16:53

## 2020-04-07 RX ADMIN — Medication 7 UNIT(S): at 16:50

## 2020-04-07 RX ADMIN — HEPARIN SODIUM 10 UNIT(S)/HR: 5000 INJECTION INTRAVENOUS; SUBCUTANEOUS at 11:43

## 2020-04-07 RX ADMIN — Medication 25 MILLIGRAM(S): at 04:21

## 2020-04-07 RX ADMIN — Medication 2: at 16:51

## 2020-04-07 RX ADMIN — CEFEPIME 100 MILLIGRAM(S): 1 INJECTION, POWDER, FOR SOLUTION INTRAMUSCULAR; INTRAVENOUS at 16:50

## 2020-04-07 RX ADMIN — Medication 50 MILLIGRAM(S): at 04:21

## 2020-04-07 RX ADMIN — Medication 2: at 11:56

## 2020-04-07 RX ADMIN — Medication 325 MILLIGRAM(S): at 04:21

## 2020-04-07 RX ADMIN — MAGNESIUM OXIDE 400 MG ORAL TABLET 400 MILLIGRAM(S): 241.3 TABLET ORAL at 16:52

## 2020-04-07 RX ADMIN — Medication 30 MILLIGRAM(S): at 04:21

## 2020-04-07 RX ADMIN — Medication 7 UNIT(S): at 11:56

## 2020-04-07 RX ADMIN — Medication 3: at 08:23

## 2020-04-07 RX ADMIN — MIDAZOLAM HYDROCHLORIDE 1.96 MG/KG/HR: 1 INJECTION, SOLUTION INTRAMUSCULAR; INTRAVENOUS at 11:42

## 2020-04-07 NOTE — PROGRESS NOTE ADULT - SUBJECTIVE AND OBJECTIVE BOX
KEZIA MOYER  74y, Male  Allergy: No Known Allergies      LOS  12d    CHIEF COMPLAINT: SOB (07 Apr 2020 10:20)      INTERVAL EVENTS/HPI  - intubated  - T(F): , Max: 97.6 (04-06-20 @ 20:50)  - no labs today       ROS  unable to obtain history secondary to patient's mental status and/or sedation     VITALS:  T(F): 96.6, Max: 97.6 (04-06-20 @ 20:50)  HR: 75  BP: 153/72  RR: 18Vital Signs Last 24 Hrs  T(C): 35.9 (07 Apr 2020 06:32), Max: 36.4 (06 Apr 2020 20:50)  T(F): 96.6 (07 Apr 2020 06:32), Max: 97.6 (06 Apr 2020 20:50)  HR: 75 (07 Apr 2020 06:32) (75 - 75)  BP: 153/72 (07 Apr 2020 06:32) (153/72 - 165/72)  BP(mean): --  RR: 18 (07 Apr 2020 06:32) (18 - 18)  SpO2: 89% (07 Apr 2020 09:00) (87% - 94%)    PHYSICAL EXAM:  General: intubated  HEENT: NCAT  CV: RRR  Lungs: symmetric chest expansion  Skin: no rash  Neuro: sedated  Lines     FH: Non-contributory  Social Hx: Non-contributory    TESTS & MEASUREMENTS:                        10.7   15.74 )-----------( 288      ( 06 Apr 2020 05:42 )             32.5     04-06    136  |  101  |  44<H>  ----------------------------<  321<H>  5.2<H>   |  25  |  1.5    Ca    8.1<L>      06 Apr 2020 05:42  Mg     2.7     04-06    TPro  6.0  /  Alb  2.8<L>  /  TBili  0.6  /  DBili  x   /  AST  98<H>  /  ALT  115<H>  /  AlkPhos  145<H>  04-06      LIVER FUNCTIONS - ( 06 Apr 2020 05:42 )  Alb: 2.8 g/dL / Pro: 6.0 g/dL / ALK PHOS: 145 U/L / ALT: 115 U/L / AST: 98 U/L / GGT: x                     INFECTIOUS DISEASES TESTING  Procalcitonin, Serum: 0.14 (04-06-20 @ 11:17)  Procalcitonin, Serum: 0.15 (04-06-20 @ 05:42)  Procalcitonin, Serum: 0.25 (04-04-20 @ 10:02)  Procalcitonin, Serum: 0.29 (04-03-20 @ 06:48)  Procalcitonin, Serum: 0.22 (04-01-20 @ 06:25)  Procalcitonin, Serum: 0.22 (03-31-20 @ 04:30)  Procalcitonin, Serum: 0.28 (03-26-20 @ 06:10)  COVID-19 PCR: Detected (03-25-20 @ 23:27)  Flu B Result: Negative (03-25-20 @ 23:16)  RSV Result: Negative (03-25-20 @ 23:16)  Flu A Result: Negative (03-25-20 @ 23:16)      INFLAMMATORY MARKERS  C-Reactive Protein, Serum: 3.76 mg/dL (04-06-20 @ 11:17)  C-Reactive Protein, Serum: 4.75 mg/dL (04-06-20 @ 05:42)  C-Reactive Protein, Serum: 13.35 mg/dL (04-04-20 @ 10:02)  C-Reactive Protein, Serum: 14.57 mg/dL (04-03-20 @ 06:48)      RADIOLOGY & ADDITIONAL TESTS:  I have personally reviewed the last available Chest xray  CXR      CT      CARDIOLOGY TESTING  12 Lead ECG:   Ventricular Rate 125 BPM    Atrial Rate 250 BPM    QRS Duration 78 ms    Q-T Interval 344 ms    QTC Calculation(Bezet) 496 ms    R Axis 48 degrees    T Axis 2 degrees    Diagnosis Line Atrial flutter with variable A-V block with premature ventricular or  aberrantly conducted complexes  Abnormal ECG    Confirmed by Scooter Ortega ([id number]) on 4/6/2020 9:40:06 PM (04-04-20 @ 05:06)  12 Lead ECG:   Ventricular Rate 69 BPM    Atrial Rate 69 BPM    P-R Interval 152 ms    QRS Duration 76 ms    Q-T Interval 434 ms    QTC Calculation(Bezet) 465 ms    P Axis 49 degrees    R Axis 56 degrees    T Axis 74 degrees    Diagnosis Line Normal sinus rhythm  Possible Left atrial enlargement  Nonspecific T wave abnormality  Prolonged QT  Abnormal ECG    Confirmed by Scooter Ortega ([id number]) on 4/2/2020 12:05:08 AM (04-01-20 @ 21:37)      MEDICATIONS  chlorhexidine 0.12% Liquid 15  clopidogrel Tablet 75  diltiazem    Tablet 30  heparin  Infusion 1000  insulin glargine Injectable (LANTUS) 20  insulin lispro (HumaLOG) corrective regimen sliding scale   insulin lispro Injectable (HumaLOG) 7  magnesium oxide 400  methylPREDNISolone sodium succinate Injectable 50  metoprolol tartrate 25  midazolam Infusion 0.02  morphine  Infusion. 2  sodium bicarbonate 325      WEIGHT  Weight (kg): 97.8 (04-02-20 @ 14:43)      ANTIBIOTICS:      All available historical records have been reviewed

## 2020-04-07 NOTE — PROGRESS NOTE ADULT - ASSESSMENT
74y old Male who presents with a chief complaint of cough x 1 week  Currently admitted to medicine with the primary diagnosis of Acute hypoxic respiratory failure secondary to SARS COv2 pneumonia    IMPRESSION:  # COVID 19 PNA, intubated 4/7    CXR with diffuse bilateral opacities , white out    Procalcitonin, Serum: 0.14 (04-06-20 @ 11:17)    Procalcitonin, Serum: 0.22 (04-01-20 @ 06:25)  #Cytokine Release Syndrome   C-Reactive Protein, Serum: 3.76 mg/dL (04-06-20 @ 11:17)  Ferritin, Serum: 1322 ng/mL (04-06-20 @ 11:17)  D-Dimer Assay, Quantitative: 3590 ng/mL DDU (04-06-20 @ 11:17)  C-Reactive Protein, Serum: 4.75 mg/dL (04-06-20 @ 05:42)  #hyponatremia    RECOMMENDATIONS:  - s/p azithro/plaqeunil/ Anakinra 100mg SC q6h x 3 days   - on steroids   - on heparin gtt  - Repeat procalcitonin if rising Start Cefepime IV    Spectra [id number]

## 2020-04-07 NOTE — PROGRESS NOTE ADULT - SUBJECTIVE AND OBJECTIVE BOX
KEZIA MOYER 74y Male  MRN#: [id number]   CODE STATUS:___FULL_____      SUBJECTIVE  Patient is a 74y old Male who presents with a chief complaint of SOB (06 Apr 2020 09:28)  Currently admitted to medicine with the primary diagnosis of Pneumonia due to SARS-associated coronavirus    satting 81% on NRB in prone position  likely will be intubated today    OBJECTIVE  PAST MEDICAL & SURGICAL HISTORY  CAD (coronary artery disease)  Kidney stones  High cholesterol  HTN (hypertension)  S/P drug eluting coronary stent placement: &gt; 10 years ago    ALLERGIES:  No Known Allergies    MEDICATIONS:  STANDING MEDICATIONS  clopidogrel Tablet 75 milliGRAM(s) Oral daily  diltiazem    Tablet 30 milliGRAM(s) Oral three times a day  heparin  Infusion 1000 Unit(s)/Hr IV Continuous <Continuous>  insulin glargine Injectable (LANTUS) 20 Unit(s) SubCutaneous at bedtime  insulin lispro (HumaLOG) corrective regimen sliding scale   SubCutaneous three times a day before meals  insulin lispro Injectable (HumaLOG) 7 Unit(s) SubCutaneous three times a day before meals  magnesium oxide 400 milliGRAM(s) Oral three times a day with meals  methylPREDNISolone sodium succinate Injectable 50 milliGRAM(s) IV Push two times a day  metoprolol tartrate 25 milliGRAM(s) Oral two times a day  sodium bicarbonate 325 milliGRAM(s) Oral two times a day    PRN MEDICATIONS  acetaminophen   Tablet .. 650 milliGRAM(s) Oral every 6 hours PRN      VITAL SIGNS: Last 24 Hours  T(C): 35.9 (07 Apr 2020 06:32), Max: 36.4 (06 Apr 2020 20:50)  T(F): 96.6 (07 Apr 2020 06:32), Max: 97.6 (06 Apr 2020 20:50)  HR: 75 (07 Apr 2020 06:32) (75 - 75)  BP: 153/72 (07 Apr 2020 06:32) (153/72 - 165/72)  BP(mean): --  RR: 18 (07 Apr 2020 06:32) (18 - 18)  SpO2: 89% (07 Apr 2020 09:00) (87% - 94%)    LABS:                        10.7   15.74 )-----------( 288      ( 06 Apr 2020 05:42 )             32.5     04-06    136  |  101  |  44<H>  ----------------------------<  321<H>  5.2<H>   |  25  |  1.5    Ca    8.1<L>      06 Apr 2020 05:42  Mg     2.7     04-06    TPro  6.0  /  Alb  2.8<L>  /  TBili  0.6  /  DBili  x   /  AST  98<H>  /  ALT  115<H>  /  AlkPhos  145<H>  04-06    PTT - ( 07 Apr 2020 00:41 )  PTT:53.6 sec              RADIOLOGY:      PHYSICAL EXAM:  GENERAL: NAD, proned position, on NRB  HEENT:  Atraumatic, Normocephalic. EOMI, PERRLA, conjunctiva and sclera clear, No JVD  PULMONARY: poor air entry b/l, no wheezing/rhonchi/rales  CARDIOVASCULAR: Regular rate and rhythm; No murmurs, rubs, or gallops  GASTROINTESTINAL: Soft, Nontender, Nondistended; Bowel sounds present  MUSCULOSKELETAL:  2+ Peripheral Pulses, No clubbing, cyanosis, or edema  NEUROLOGY: non-focal  SKIN: No rashes or lesions      ADMISSION SUMMARY  Patient is a 74y old Male who presents with a chief complaint of SOB (06 Apr 2020 09:28)  Currently admitted to medicine with the primary diagnosis of Pneumonia due to SARS-associated coronavirus      ASSESSMENT & PLAN    75 YO M with a PMH of HTN, HLD, CAD s/p stent, and 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  In the ED, hypoxic and put on 5L NC.  CXR with extensive B/L interstitial opacities concerning for viral pneumonia.  Admitted for suspected COVID-19 infection.  Patient upgraded to the ICU on 4/3 for increasing oxygen requirements on 100% NRB and increased work of breathing.       #Acute hypoxic respiratory failure 2/2 SARS COV2 pneumonia with cytokine release syndrome  -Covid-19 +  -afebrile, BP stable, not tachy  -currently satting 81% on NRB- possible intubation today  -sats improve with prone positioning- continue proning  -low threshold for intubation; discussed with patient and he is aware (would like Dr. Davila and Kleiner to be notified to make the decision before intubation)  -CXR with diffuse b/l opacities-worsening, complete white out  -ID following  -s/p plaquenil, azithromycin, and anakinra  -cont solumedrol 50 q12 (started 4/5)  -crp 3, procal .14  -LDH, ferritin, d-dimer elevated  -keep neutral balance    #Afib with RVR  -rate controlled  -cardizem 30 mg TID  -lopressor 25 mg BID  -A/C with heparin, monitor PTTs    #KEEGAN on CKD3/4- resolved  -baseline Cr 2  -Cr 1.5, continue to monitor  -non oliguric  -monitor BMP  -avoid nephrotoxins and hypotension  -follows with Dr. Kleiner    #HTN  -cont metoprolol    #CAD s/p stent  -cont plavix, metoprolol    #DLD  -holding statin as LFTs>100    # DM  -monitor fs  -insulin basal/bolus    Diet clears  DVT ppx heparin  Dispo- acute   Updated plan with Dr. Davila

## 2020-04-07 NOTE — PROGRESS NOTE ADULT - SUBJECTIVE AND OBJECTIVE BOX
Patient is a 74y old  Male who presents with a chief complaint of SOB (06 Apr 2020 09:28)        Over Night Events: No acute events overnight        ROS:  See HPI    PHYSICAL EXAM    ICU Vital Signs Last 24 Hrs  T(C): 35.9 (07 Apr 2020 06:32), Max: 36.4 (06 Apr 2020 20:50)  T(F): 96.6 (07 Apr 2020 06:32), Max: 97.6 (06 Apr 2020 20:50)  HR: 75 (07 Apr 2020 06:32) (75 - 75)  BP: 153/72 (07 Apr 2020 06:32) (153/72 - 165/72)  BP(mean): --  ABP: --  ABP(mean): --  RR: 18 (07 Apr 2020 06:32) (18 - 18)  SpO2: 89% (07 Apr 2020 09:00) (87% - 94%)      General: NAD  HEENT: HU             Lungs: Bilateral crackles+  Cardiovascular: Regular   Gastrointestinal: Soft, Positive BS  Extremities: No clubbing.  Moves extremities.  Full Range of motion   Skin: Warm, intact  Neurological: No motor or sensory deficit       04-06-20 @ 07:01  -  04-07-20 @ 07:00  --------------------------------------------------------  IN:    heparin Infusion: 99 mL  Total IN: 99 mL    OUT:    Voided: 400 mL  Total OUT: 400 mL    Total NET: -301 mL          LABS:                            10.7   15.74 )-----------( 288      ( 06 Apr 2020 05:42 )             32.5                                               04-06               10.7   15.74 )-----------( 288      ( 04-06 @ 05:42 )             32.5                11.4   17.49 )-----------( 271      ( 04-05 @ 05:52 )             34.8       136  |  101  |  44<H>  ----------------------------<  321<H>  5.2<H>   |  25  |  1.5    06 Apr 2020 05:42    136    |  101    |  44<H>  ----------------------------<  321<H>  5.2<H>   |  25     |  1.5      Ca    8.1<L>      06 Apr 2020 05:42  Mg     2.7<H>     06 Apr 2020 05:42    TPro  6.0    /  Alb  2.8<L>  /  TBili  0.6    /  DBili  x      /  AST  98<H>  /  ALT  115<H>  /  AlkPhos  145<H>  06 Apr 2020 05:42    Ca    8.1<L>      06 Apr 2020 05:42  Mg     2.7     04-06    TPro  6.0  /  Alb  2.8<L>  /  TBili  0.6  /  DBili  x   /  AST  98<H>  /  ALT  115<H>  /  AlkPhos  145<H>  04-06      PTT - ( 07 Apr 2020 00:41 )  PTT:53.6 sec                                                                                     LIVER FUNCTIONS - ( 06 Apr 2020 05:42 )  Alb: 2.8 g/dL / Pro: 6.0 g/dL / ALK PHOS: 145 U/L / ALT: 115 U/L / AST: 98 U/L / GGT: x                                                                                                                                       MEDICATIONS  (STANDING):  clopidogrel Tablet 75 milliGRAM(s) Oral daily  diltiazem    Tablet 30 milliGRAM(s) Oral three times a day  heparin  Infusion 1000 Unit(s)/Hr (10 mL/Hr) IV Continuous <Continuous>  insulin glargine Injectable (LANTUS) 20 Unit(s) SubCutaneous at bedtime  insulin lispro (HumaLOG) corrective regimen sliding scale   SubCutaneous three times a day before meals  insulin lispro Injectable (HumaLOG) 7 Unit(s) SubCutaneous three times a day before meals  magnesium oxide 400 milliGRAM(s) Oral three times a day with meals  methylPREDNISolone sodium succinate Injectable 50 milliGRAM(s) IV Push two times a day  metoprolol tartrate 25 milliGRAM(s) Oral two times a day  sodium bicarbonate 325 milliGRAM(s) Oral two times a day    MEDICATIONS  (PRN):  acetaminophen   Tablet .. 650 milliGRAM(s) Oral every 6 hours PRN Temp greater or equal to 38C (100.4F)      Xrays:       B/L diffuse infiltrates worse when compared to CXR 4/6                                                                               ECHO not done Patient is a 74y old  Male who presents with a chief complaint of SOB (06 Apr 2020 09:28)        Over Night Events:  worsening hypoxia       ROS:  See HPI    PHYSICAL EXAM    ICU Vital Signs Last 24 Hrs  T(C): 35.9 (07 Apr 2020 06:32), Max: 36.4 (06 Apr 2020 20:50)  T(F): 96.6 (07 Apr 2020 06:32), Max: 97.6 (06 Apr 2020 20:50)  HR: 75 (07 Apr 2020 06:32) (75 - 75)  BP: 153/72 (07 Apr 2020 06:32) (153/72 - 165/72)  BP(mean): --  ABP: --  ABP(mean): --  RR: 18 (07 Apr 2020 06:32) (18 - 18)  SpO2: 89% (07 Apr 2020 09:00) (87% - 94%)      General: NAD  HEENT: HU             Lungs: Bilateral crackles+  Cardiovascular: Regular   Gastrointestinal: Soft, Positive BS  Extremities: No clubbing.  Moves extremities.  Full Range of motion   Skin: Warm, intact  Neurological: No motor or sensory deficit       04-06-20 @ 07:01  -  04-07-20 @ 07:00  --------------------------------------------------------  IN:    heparin Infusion: 99 mL  Total IN: 99 mL    OUT:    Voided: 400 mL  Total OUT: 400 mL    Total NET: -301 mL          LABS:                            10.7   15.74 )-----------( 288      ( 06 Apr 2020 05:42 )             32.5                                               04-06               10.7   15.74 )-----------( 288      ( 04-06 @ 05:42 )             32.5                11.4   17.49 )-----------( 271      ( 04-05 @ 05:52 )             34.8       136  |  101  |  44<H>  ----------------------------<  321<H>  5.2<H>   |  25  |  1.5    06 Apr 2020 05:42    136    |  101    |  44<H>  ----------------------------<  321<H>  5.2<H>   |  25     |  1.5      Ca    8.1<L>      06 Apr 2020 05:42  Mg     2.7<H>     06 Apr 2020 05:42    TPro  6.0    /  Alb  2.8<L>  /  TBili  0.6    /  DBili  x      /  AST  98<H>  /  ALT  115<H>  /  AlkPhos  145<H>  06 Apr 2020 05:42    Ca    8.1<L>      06 Apr 2020 05:42  Mg     2.7     04-06    TPro  6.0  /  Alb  2.8<L>  /  TBili  0.6  /  DBili  x   /  AST  98<H>  /  ALT  115<H>  /  AlkPhos  145<H>  04-06      PTT - ( 07 Apr 2020 00:41 )  PTT:53.6 sec                                                                                     LIVER FUNCTIONS - ( 06 Apr 2020 05:42 )  Alb: 2.8 g/dL / Pro: 6.0 g/dL / ALK PHOS: 145 U/L / ALT: 115 U/L / AST: 98 U/L / GGT: x                                                                                                                                       MEDICATIONS  (STANDING):  clopidogrel Tablet 75 milliGRAM(s) Oral daily  diltiazem    Tablet 30 milliGRAM(s) Oral three times a day  heparin  Infusion 1000 Unit(s)/Hr (10 mL/Hr) IV Continuous <Continuous>  insulin glargine Injectable (LANTUS) 20 Unit(s) SubCutaneous at bedtime  insulin lispro (HumaLOG) corrective regimen sliding scale   SubCutaneous three times a day before meals  insulin lispro Injectable (HumaLOG) 7 Unit(s) SubCutaneous three times a day before meals  magnesium oxide 400 milliGRAM(s) Oral three times a day with meals  methylPREDNISolone sodium succinate Injectable 50 milliGRAM(s) IV Push two times a day  metoprolol tartrate 25 milliGRAM(s) Oral two times a day  sodium bicarbonate 325 milliGRAM(s) Oral two times a day    MEDICATIONS  (PRN):  acetaminophen   Tablet .. 650 milliGRAM(s) Oral every 6 hours PRN Temp greater or equal to 38C (100.4F)      Xrays:       B/L diffuse infiltrates worse when compared to CXR 4/6                                                                               ECHO not done

## 2020-04-07 NOTE — PROGRESS NOTE ADULT - ASSESSMENT
IMPRESSION:    Acute Hypoxemic Respiratory Failure   COVID19 PNA  Afib with RVR    PLAN:    CNS: Avoid CNS depressants.    HEENT: Oral care    PULMONARY:  HOB @ 45 degrees. Aspiration Precautions . CXR in am . Supplemental oxygen to keep sat 92-94%. Low threshold for intubation . prone patient    CARDIOVASCULAR: Avoid volume overload . Keep I=O, rate control     GI: GI prophylaxis.  Feeding NPO     RENAL:  Follow up lytes.  Correct as needed    INFECTIOUS DISEASE: Follow up cultures. ABX No abx Anakinra as per ID    HEMATOLOGICAL:  DVT prophylaxis- on heparin for afib . F/U PTT    ENDOCRINE:  Follow up FS.  Insulin protocol if needed.    MUSCULOSKELETAL: Bedrest    Lines: Peripheral IV    Code status: Full code  guarded prognosis     Disposition: MICU monitoring

## 2020-04-08 LAB
ALBUMIN SERPL ELPH-MCNC: 2.5 G/DL — LOW (ref 3.5–5.2)
ALP SERPL-CCNC: 125 U/L — HIGH (ref 30–115)
ALT FLD-CCNC: 85 U/L — HIGH (ref 0–41)
ANION GAP SERPL CALC-SCNC: 11 MMOL/L — SIGNIFICANT CHANGE UP (ref 7–14)
APTT BLD: 84.4 SEC — CRITICAL HIGH (ref 27–39.2)
APTT BLD: 95.2 SEC — CRITICAL HIGH (ref 27–39.2)
AST SERPL-CCNC: 53 U/L — HIGH (ref 0–41)
BASOPHILS # BLD AUTO: 0 K/UL — SIGNIFICANT CHANGE UP (ref 0–0.2)
BASOPHILS NFR BLD AUTO: 0 % — SIGNIFICANT CHANGE UP (ref 0–1)
BILIRUB SERPL-MCNC: 0.7 MG/DL — SIGNIFICANT CHANGE UP (ref 0.2–1.2)
BUN SERPL-MCNC: 44 MG/DL — HIGH (ref 10–20)
CALCIUM SERPL-MCNC: 7.6 MG/DL — LOW (ref 8.5–10.1)
CHLORIDE SERPL-SCNC: 105 MMOL/L — SIGNIFICANT CHANGE UP (ref 98–110)
CO2 SERPL-SCNC: 27 MMOL/L — SIGNIFICANT CHANGE UP (ref 17–32)
CREAT SERPL-MCNC: 1.4 MG/DL — SIGNIFICANT CHANGE UP (ref 0.7–1.5)
EOSINOPHIL # BLD AUTO: 0 K/UL — SIGNIFICANT CHANGE UP (ref 0–0.7)
EOSINOPHIL NFR BLD AUTO: 0 % — SIGNIFICANT CHANGE UP (ref 0–8)
GAS PNL BLDA: SIGNIFICANT CHANGE UP
GAS PNL BLDA: SIGNIFICANT CHANGE UP
GLUCOSE BLDC GLUCOMTR-MCNC: 194 MG/DL — HIGH (ref 70–99)
GLUCOSE BLDC GLUCOMTR-MCNC: 206 MG/DL — HIGH (ref 70–99)
GLUCOSE BLDC GLUCOMTR-MCNC: 240 MG/DL — HIGH (ref 70–99)
GLUCOSE BLDC GLUCOMTR-MCNC: 259 MG/DL — HIGH (ref 70–99)
GLUCOSE BLDC GLUCOMTR-MCNC: 320 MG/DL — HIGH (ref 70–99)
GLUCOSE SERPL-MCNC: 188 MG/DL — HIGH (ref 70–99)
HCT VFR BLD CALC: 30.8 % — LOW (ref 42–52)
HGB BLD-MCNC: 9.9 G/DL — LOW (ref 14–18)
IMM GRANULOCYTES NFR BLD AUTO: 1.3 % — HIGH (ref 0.1–0.3)
LYMPHOCYTES # BLD AUTO: 1.29 K/UL — SIGNIFICANT CHANGE UP (ref 1.2–3.4)
LYMPHOCYTES # BLD AUTO: 11.6 % — LOW (ref 20.5–51.1)
MCHC RBC-ENTMCNC: 29.1 PG — SIGNIFICANT CHANGE UP (ref 27–31)
MCHC RBC-ENTMCNC: 32.1 G/DL — SIGNIFICANT CHANGE UP (ref 32–37)
MCV RBC AUTO: 90.6 FL — SIGNIFICANT CHANGE UP (ref 80–94)
MONOCYTES # BLD AUTO: 0.54 K/UL — SIGNIFICANT CHANGE UP (ref 0.1–0.6)
MONOCYTES NFR BLD AUTO: 4.8 % — SIGNIFICANT CHANGE UP (ref 1.7–9.3)
NEUTROPHILS # BLD AUTO: 9.18 K/UL — HIGH (ref 1.4–6.5)
NEUTROPHILS NFR BLD AUTO: 82.3 % — HIGH (ref 42.2–75.2)
NRBC # BLD: 0 /100 WBCS — SIGNIFICANT CHANGE UP (ref 0–0)
PLATELET # BLD AUTO: 281 K/UL — SIGNIFICANT CHANGE UP (ref 130–400)
POTASSIUM SERPL-MCNC: 4.9 MMOL/L — SIGNIFICANT CHANGE UP (ref 3.5–5)
POTASSIUM SERPL-SCNC: 4.9 MMOL/L — SIGNIFICANT CHANGE UP (ref 3.5–5)
PROCALCITONIN SERPL-MCNC: 0.14 NG/ML — HIGH (ref 0.02–0.1)
PROT SERPL-MCNC: 5.3 G/DL — LOW (ref 6–8)
RBC # BLD: 3.4 M/UL — LOW (ref 4.7–6.1)
RBC # FLD: 13.7 % — SIGNIFICANT CHANGE UP (ref 11.5–14.5)
SODIUM SERPL-SCNC: 143 MMOL/L — SIGNIFICANT CHANGE UP (ref 135–146)
WBC # BLD: 11.15 K/UL — HIGH (ref 4.8–10.8)
WBC # FLD AUTO: 11.15 K/UL — HIGH (ref 4.8–10.8)

## 2020-04-08 PROCEDURE — 71045 X-RAY EXAM CHEST 1 VIEW: CPT | Mod: 26

## 2020-04-08 PROCEDURE — 93010 ELECTROCARDIOGRAM REPORT: CPT

## 2020-04-08 RX ORDER — METOPROLOL TARTRATE 50 MG
25 TABLET ORAL
Refills: 0 | Status: DISCONTINUED | OUTPATIENT
Start: 2020-04-08 | End: 2020-04-09

## 2020-04-08 RX ORDER — HEPARIN SODIUM 5000 [USP'U]/ML
900 INJECTION INTRAVENOUS; SUBCUTANEOUS
Qty: 25000 | Refills: 0 | Status: DISCONTINUED | OUTPATIENT
Start: 2020-04-08 | End: 2020-04-09

## 2020-04-08 RX ORDER — SODIUM CHLORIDE 9 MG/ML
250 INJECTION INTRAMUSCULAR; INTRAVENOUS; SUBCUTANEOUS ONCE
Refills: 0 | Status: COMPLETED | OUTPATIENT
Start: 2020-04-08 | End: 2020-04-08

## 2020-04-08 RX ADMIN — Medication 50 MILLIGRAM(S): at 05:19

## 2020-04-08 RX ADMIN — SODIUM CHLORIDE 500 MILLILITER(S): 9 INJECTION INTRAMUSCULAR; INTRAVENOUS; SUBCUTANEOUS at 03:44

## 2020-04-08 RX ADMIN — Medication 325 MILLIGRAM(S): at 05:19

## 2020-04-08 RX ADMIN — INSULIN GLARGINE 20 UNIT(S): 100 INJECTION, SOLUTION SUBCUTANEOUS at 23:29

## 2020-04-08 RX ADMIN — CEFEPIME 100 MILLIGRAM(S): 1 INJECTION, POWDER, FOR SOLUTION INTRAMUSCULAR; INTRAVENOUS at 05:17

## 2020-04-08 RX ADMIN — CLOPIDOGREL BISULFATE 75 MILLIGRAM(S): 75 TABLET, FILM COATED ORAL at 14:15

## 2020-04-08 RX ADMIN — CHLORHEXIDINE GLUCONATE 15 MILLILITER(S): 213 SOLUTION TOPICAL at 02:58

## 2020-04-08 RX ADMIN — Medication 325 MILLIGRAM(S): at 17:35

## 2020-04-08 RX ADMIN — Medication 30 MILLIGRAM(S): at 14:15

## 2020-04-08 RX ADMIN — Medication 60 MILLIGRAM(S): at 17:35

## 2020-04-08 RX ADMIN — CHLORHEXIDINE GLUCONATE 15 MILLILITER(S): 213 SOLUTION TOPICAL at 17:35

## 2020-04-08 RX ADMIN — Medication 7 UNIT(S): at 17:33

## 2020-04-08 RX ADMIN — CEFEPIME 100 MILLIGRAM(S): 1 INJECTION, POWDER, FOR SOLUTION INTRAMUSCULAR; INTRAVENOUS at 17:35

## 2020-04-08 RX ADMIN — Medication 3: at 17:33

## 2020-04-08 NOTE — PROGRESS NOTE ADULT - SUBJECTIVE AND OBJECTIVE BOX
Patient is a 74y old  Male who presents with a chief complaint of SOB (07 Apr 2020 10:20)        Over Night Events: Remains critically ill and on MV, Afebrile overnight        ROS:  See HPI    PHYSICAL EXAM    ICU Vital Signs Last 24 Hrs  T(C): 36.8 (08 Apr 2020 06:39), Max: 36.8 (07 Apr 2020 20:30)  T(F): 98.2 (08 Apr 2020 06:39), Max: 98.2 (07 Apr 2020 20:30)  HR: 56 (08 Apr 2020 06:39) (48 - 85)  BP: 153/70 (08 Apr 2020 06:39) (85/49 - 176/82)  BP(mean): --  ABP: --  ABP(mean): --  RR: 18 (08 Apr 2020 06:39) (18 - 25)  SpO2: 99% (08 Apr 2020 06:39) (94% - 99%)      General: Intubated and sedated  HEENT: HU, ET+             Lungs: Bilateral BS  Cardiovascular: Regular   Gastrointestinal: Soft, Positive BS  Extremities: No clubbing.  Moves extremities.  Full Range of motion   Skin: Warm, intact  Neurological: sedated      04-07-20 @ 07:01  -  04-08-20 @ 07:00  --------------------------------------------------------  IN:    heparin Infusion: 120 mL    midazolam Infusion: 38.6 mL    morphine  Infusion.: 16 mL  Total IN: 174.6 mL    OUT:    Indwelling Catheter - Urethral: 1015 mL    Voided: 100 mL  Total OUT: 1115 mL    Total NET: -940.4 mL          LABS:                            9.9    11.15 )-----------( 281      ( 08 Apr 2020 04:30 )             30.8                                               04-08               9.9    11.15 )-----------( 281      ( 04-08 @ 04:30 )             30.8                11.2   16.36 )-----------( 364      ( 04-07 @ 11:05 )             35.6                10.7   15.74 )-----------( 288      ( 04-06 @ 05:42 )             32.5       143  |  105  |  44<H>  ----------------------------<  188<H>  4.9   |  27  |  1.4    08 Apr 2020 04:30    143    |  105    |  44<H>  ----------------------------<  188<H>  4.9     |  27     |  1.4    07 Apr 2020 11:05    141    |  101    |  42<H>  ----------------------------<  234<H>  4.4     |  26     |  1.6<H>    Ca    7.6<L>      08 Apr 2020 04:30  Ca    8.0<L>      07 Apr 2020 11:05    TPro  5.3<L>  /  Alb  2.5<L>  /  TBili  0.7    /  DBili  x      /  AST  53<H>  /  ALT  85<H>  /  AlkPhos  125<H>  08 Apr 2020 04:30  TPro  6.1    /  Alb  2.8<L>  /  TBili  0.8    /  DBili  x      /  AST  93<H>  /  ALT  112<H>  /  AlkPhos  158<H>  07 Apr 2020 11:05    Ca    7.6<L>      08 Apr 2020 04:30    TPro  5.3<L>  /  Alb  2.5<L>  /  TBili  0.7  /  DBili  x   /  AST  53<H>  /  ALT  85<H>  /  AlkPhos  125<H>  04-08      PTT - ( 08 Apr 2020 04:30 )  PTT:84.4 sec                                                                                     LIVER FUNCTIONS - ( 08 Apr 2020 04:30 )  Alb: 2.5 g/dL / Pro: 5.3 g/dL / ALK PHOS: 125 U/L / ALT: 85 U/L / AST: 53 U/L / GGT: x                                                                                               Mode: AC/ CMV (Assist Control/ Continuous Mandatory Ventilation)  RR (machine): 25  TV (machine): 420  FiO2: 80  PEEP: 12  ITime: 1  MAP: 19  PIP: 34                                      ABG - ( 08 Apr 2020 04:30 )  pH, Arterial: 7.44  pH, Blood: x     /  pCO2: 44    /  pO2: 82    / HCO3: 30    / Base Excess: 5.5   /  SaO2: 96     PPL 31              MEDICATIONS  (STANDING):  cefepime   IVPB 2000 milliGRAM(s) IV Intermittent every 12 hours  chlorhexidine 0.12% Liquid 15 milliLiter(s) Oral Mucosa every 12 hours  clopidogrel Tablet 75 milliGRAM(s) Oral daily  diltiazem    Tablet 30 milliGRAM(s) Oral three times a day  heparin  Infusion 1000 Unit(s)/Hr (10 mL/Hr) IV Continuous <Continuous>  insulin glargine Injectable (LANTUS) 20 Unit(s) SubCutaneous at bedtime  insulin lispro (HumaLOG) corrective regimen sliding scale   SubCutaneous three times a day before meals  insulin lispro Injectable (HumaLOG) 7 Unit(s) SubCutaneous three times a day before meals  magnesium oxide 400 milliGRAM(s) Oral three times a day with meals  methylPREDNISolone sodium succinate Injectable 50 milliGRAM(s) IV Push two times a day  midazolam Infusion 0.02 mG/kG/Hr (1.96 mL/Hr) IV Continuous <Continuous>  morphine  Infusion. 2 mG/Hr (2 mL/Hr) IV Continuous <Continuous>  sodium bicarbonate 325 milliGRAM(s) Oral two times a day    MEDICATIONS  (PRN):  acetaminophen   Tablet .. 650 milliGRAM(s) Oral every 6 hours PRN Temp greater or equal to 38C (100.4F)      Xrays:    ET okay , TLC okay , OG okay                                                                                 ECHO not done

## 2020-04-08 NOTE — PROGRESS NOTE ADULT - SUBJECTIVE AND OBJECTIVE BOX
KEZIA MOYER 74y Male  MRN#: [id number]   CODE STATUS:__FULL______      SUBJECTIVE  Patient is a 74y old Male who presents with a chief complaint of SOB (08 Apr 2020 10:11)  Currently admitted to medicine with the primary diagnosis of Pneumonia due to SARS-associated coronavirus    intubated and sedated with morphine and versed  FiO2 weaned to 80%, PEEP 12    OBJECTIVE  PAST MEDICAL & SURGICAL HISTORY  CAD (coronary artery disease)  Kidney stones  High cholesterol  HTN (hypertension)  S/P drug eluting coronary stent placement: &gt; 10 years ago    ALLERGIES:  No Known Allergies    MEDICATIONS:  STANDING MEDICATIONS  cefepime   IVPB 2000 milliGRAM(s) IV Intermittent every 12 hours  chlorhexidine 0.12% Liquid 15 milliLiter(s) Oral Mucosa every 12 hours  clopidogrel Tablet 75 milliGRAM(s) Oral daily  diltiazem    Tablet 30 milliGRAM(s) Oral three times a day  heparin  Infusion 1000 Unit(s)/Hr IV Continuous <Continuous>  insulin glargine Injectable (LANTUS) 20 Unit(s) SubCutaneous at bedtime  insulin lispro (HumaLOG) corrective regimen sliding scale   SubCutaneous three times a day before meals  insulin lispro Injectable (HumaLOG) 7 Unit(s) SubCutaneous three times a day before meals  methylPREDNISolone sodium succinate Injectable 60 milliGRAM(s) IV Push every 12 hours  midazolam Infusion 0.02 mG/kG/Hr IV Continuous <Continuous>  morphine  Infusion. 2 mG/Hr IV Continuous <Continuous>  sodium bicarbonate 325 milliGRAM(s) Oral two times a day    PRN MEDICATIONS  acetaminophen   Tablet .. 650 milliGRAM(s) Oral every 6 hours PRN      VITAL SIGNS: Last 24 Hours  T(C): 36.8 (08 Apr 2020 08:00), Max: 36.8 (07 Apr 2020 20:30)  T(F): 98.2 (08 Apr 2020 08:00), Max: 98.2 (07 Apr 2020 20:30)  HR: 59 (08 Apr 2020 08:00) (48 - 85)  BP: 169/70 (08 Apr 2020 08:00) (85/49 - 176/82)  BP(mean): --  RR: 18 (08 Apr 2020 08:00) (18 - 25)  SpO2: 99% (08 Apr 2020 08:00) (94% - 99%)    LABS:                        9.9    11.15 )-----------( 281      ( 08 Apr 2020 04:30 )             30.8     04-08    143  |  105  |  44<H>  ----------------------------<  188<H>  4.9   |  27  |  1.4    Ca    7.6<L>      08 Apr 2020 04:30    TPro  5.3<L>  /  Alb  2.5<L>  /  TBili  0.7  /  DBili  x   /  AST  53<H>  /  ALT  85<H>  /  AlkPhos  125<H>  04-08    PTT - ( 08 Apr 2020 04:30 )  PTT:84.4 sec    ABG - ( 08 Apr 2020 04:30 )  pH, Arterial: 7.44  pH, Blood: x     /  pCO2: 44    /  pO2: 82    / HCO3: 30    / Base Excess: 5.5   /  SaO2: 96                        RADIOLOGY:      PHYSICAL EXAM:  GENERAL: intubated and sedated  HEENT:  Atraumatic, Normocephalic. EOMI, PERRLA, conjunctiva and sclera clear, No JVD  PULMONARY: poor air entry b/l, no wheezing/rhonchi/rales  CARDIOVASCULAR: Regular rate and rhythm; No murmurs, rubs, or gallops  GASTROINTESTINAL: Soft, Nontender, Nondistended; Bowel sounds present  MUSCULOSKELETAL:  2+ Peripheral Pulses, No clubbing, cyanosis, or edema  NEUROLOGY: non-focal  SKIN: No rashes or lesions      ADMISSION SUMMARY  Patient is a 74y old Male who presents with a chief complaint of SOB (06 Apr 2020 09:28)  Currently admitted to medicine with the primary diagnosis of Pneumonia due to SARS-associated coronavirus      ASSESSMENT & PLAN    75 YO M with a PMH of HTN, HLD, CAD s/p stent, and 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  In the ED, hypoxic and put on 5L NC.  CXR with extensive B/L interstitial opacities concerning for viral pneumonia.  Admitted for suspected COVID-19 infection.  Patient upgraded to the ICU on 4/3 for increasing oxygen requirements on 100% NRB and increased work of breathing.       #Acute hypoxic respiratory failure 2/2 SARS COV2 pneumonia with cytokine release syndrome  -Covid-19 +  -afebrile, vitals stable  -intubated on 4/8 for worsening respiratory failure  -wean FiO2 as tolerated  -sedated with morphine and versed  -CXR with diffuse b/l opacities-slightly improved today  -ID following  -s/p plaquenil, azithromycin, and anakinra  -cont solumedrol 60 q12 (started 4/5)  -crp 3, procal .14  -LDH, ferritin, d-dimer elevated  -keep neutral balance  -cont cefepime for now (follow up procal today)  -follow up CXR in AM  -repeat ABG in afternoon    #Afib with RVR  -rate controlled  -cardizem 30 mg TID  -lopressor 25 mg BID  -A/C with heparin, monitor PTTs    #KEEGAN on 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 Kleiner    #HTN  -cont metoprolol    #CAD s/p stent  -cont plavix, metoprolol    #DLD  -holding statin as LFTs>100    # DM  -monitor fs  -insulin basal/bolus    Diet tube feeds  DVT ppx heparin  Dispo- acute   Updated plan with Dr. Davila

## 2020-04-08 NOTE — PROGRESS NOTE ADULT - ASSESSMENT
IMPRESSION:    Acute Hypoxemic Respiratory Failure S/P Intubation  COVID19 PNA  Afib with RVR    PLAN:    CNS: Sedation with morphine and versed    HEENT: Oral care    PULMONARY:  HOB @ 45 degrees. Aspiration Precautions . CXR in am . Vent settings : ARDS network protocol . Wean Fio2 . Dec  . Monitor PPL and Driving pressure . Repeat ABG pm. Solumedrol 60 q12    CARDIOVASCULAR: Avoid volume overload . Keep I=O, rate control . AC as per Cardio    GI: GI prophylaxis.  Feeding OG feeding    RENAL:  Follow up lytes.  Correct as needed. D/C PO Magnesium     INFECTIOUS DISEASE: Follow up cultures. ABX cefepime . Procalcitonin Anakinra as per ID    HEMATOLOGICAL:  DVT prophylaxis- on heparin for afib . F/U PTT    ENDOCRINE:  Follow up FS.  Insulin protocol if needed.    MUSCULOSKELETAL: Bedrest    Lines: TLC+     Code status: Full code  guarded prognosis     Disposition: MICU monitoring

## 2020-04-09 LAB
ALBUMIN SERPL ELPH-MCNC: 2.7 G/DL — LOW (ref 3.5–5.2)
ALP SERPL-CCNC: 119 U/L — HIGH (ref 30–115)
ALT FLD-CCNC: 66 U/L — HIGH (ref 0–41)
ANION GAP SERPL CALC-SCNC: 12 MMOL/L — SIGNIFICANT CHANGE UP (ref 7–14)
ANION GAP SERPL CALC-SCNC: 12 MMOL/L — SIGNIFICANT CHANGE UP (ref 7–14)
APTT BLD: 129.1 SEC — CRITICAL HIGH (ref 27–39.2)
APTT BLD: 82 SEC — CRITICAL HIGH (ref 27–39.2)
APTT BLD: 87.5 SEC — CRITICAL HIGH (ref 27–39.2)
AST SERPL-CCNC: 28 U/L — SIGNIFICANT CHANGE UP (ref 0–41)
BASE EXCESS BLDA CALC-SCNC: 3.2 MMOL/L — HIGH (ref -2–2)
BASOPHILS # BLD AUTO: 0.01 K/UL — SIGNIFICANT CHANGE UP (ref 0–0.2)
BASOPHILS NFR BLD AUTO: 0.1 % — SIGNIFICANT CHANGE UP (ref 0–1)
BILIRUB SERPL-MCNC: 0.5 MG/DL — SIGNIFICANT CHANGE UP (ref 0.2–1.2)
BUN SERPL-MCNC: 59 MG/DL — HIGH (ref 10–20)
BUN SERPL-MCNC: 60 MG/DL — HIGH (ref 10–20)
CALCIUM SERPL-MCNC: 6.6 MG/DL — LOW (ref 8.5–10.1)
CALCIUM SERPL-MCNC: 7.7 MG/DL — LOW (ref 8.5–10.1)
CHLORIDE SERPL-SCNC: 104 MMOL/L — SIGNIFICANT CHANGE UP (ref 98–110)
CHLORIDE SERPL-SCNC: 112 MMOL/L — HIGH (ref 98–110)
CO2 SERPL-SCNC: 23 MMOL/L — SIGNIFICANT CHANGE UP (ref 17–32)
CO2 SERPL-SCNC: 25 MMOL/L — SIGNIFICANT CHANGE UP (ref 17–32)
CREAT SERPL-MCNC: 1.7 MG/DL — HIGH (ref 0.7–1.5)
CREAT SERPL-MCNC: 1.8 MG/DL — HIGH (ref 0.7–1.5)
CRP SERPL-MCNC: 1.46 MG/DL — HIGH (ref 0–0.4)
EOSINOPHIL # BLD AUTO: 0 K/UL — SIGNIFICANT CHANGE UP (ref 0–0.7)
EOSINOPHIL NFR BLD AUTO: 0 % — SIGNIFICANT CHANGE UP (ref 0–8)
GLUCOSE BLDC GLUCOMTR-MCNC: 104 MG/DL — HIGH (ref 70–99)
GLUCOSE BLDC GLUCOMTR-MCNC: 128 MG/DL — HIGH (ref 70–99)
GLUCOSE BLDC GLUCOMTR-MCNC: 177 MG/DL — HIGH (ref 70–99)
GLUCOSE BLDC GLUCOMTR-MCNC: 195 MG/DL — HIGH (ref 70–99)
GLUCOSE BLDC GLUCOMTR-MCNC: 265 MG/DL — HIGH (ref 70–99)
GLUCOSE BLDC GLUCOMTR-MCNC: 401 MG/DL — HIGH (ref 70–99)
GLUCOSE BLDC GLUCOMTR-MCNC: 416 MG/DL — HIGH (ref 70–99)
GLUCOSE BLDC GLUCOMTR-MCNC: 432 MG/DL — HIGH (ref 70–99)
GLUCOSE SERPL-MCNC: 157 MG/DL — HIGH (ref 70–99)
GLUCOSE SERPL-MCNC: 444 MG/DL — HIGH (ref 70–99)
HCO3 BLDA-SCNC: 28 MMOL/L — HIGH (ref 23–27)
HCT VFR BLD CALC: 31.2 % — LOW (ref 42–52)
HGB BLD-MCNC: 10 G/DL — LOW (ref 14–18)
IMM GRANULOCYTES NFR BLD AUTO: 1.2 % — HIGH (ref 0.1–0.3)
LYMPHOCYTES # BLD AUTO: 0.85 K/UL — LOW (ref 1.2–3.4)
LYMPHOCYTES # BLD AUTO: 7.9 % — LOW (ref 20.5–51.1)
MCHC RBC-ENTMCNC: 29.7 PG — SIGNIFICANT CHANGE UP (ref 27–31)
MCHC RBC-ENTMCNC: 32.1 G/DL — SIGNIFICANT CHANGE UP (ref 32–37)
MCV RBC AUTO: 92.6 FL — SIGNIFICANT CHANGE UP (ref 80–94)
MONOCYTES # BLD AUTO: 0.41 K/UL — SIGNIFICANT CHANGE UP (ref 0.1–0.6)
MONOCYTES NFR BLD AUTO: 3.8 % — SIGNIFICANT CHANGE UP (ref 1.7–9.3)
NEUTROPHILS # BLD AUTO: 9.42 K/UL — HIGH (ref 1.4–6.5)
NEUTROPHILS NFR BLD AUTO: 87 % — HIGH (ref 42.2–75.2)
NRBC # BLD: 0 /100 WBCS — SIGNIFICANT CHANGE UP (ref 0–0)
PCO2 BLDA: 46 MMHG — HIGH (ref 38–42)
PH BLDA: 7.4 — SIGNIFICANT CHANGE UP (ref 7.38–7.42)
PLATELET # BLD AUTO: 270 K/UL — SIGNIFICANT CHANGE UP (ref 130–400)
PO2 BLDA: 82 MMHG — SIGNIFICANT CHANGE UP (ref 78–95)
POTASSIUM SERPL-MCNC: 4.2 MMOL/L — SIGNIFICANT CHANGE UP (ref 3.5–5)
POTASSIUM SERPL-MCNC: 5.2 MMOL/L — HIGH (ref 3.5–5)
POTASSIUM SERPL-SCNC: 4.2 MMOL/L — SIGNIFICANT CHANGE UP (ref 3.5–5)
POTASSIUM SERPL-SCNC: 5.2 MMOL/L — HIGH (ref 3.5–5)
PROT SERPL-MCNC: 5.3 G/DL — LOW (ref 6–8)
RBC # BLD: 3.37 M/UL — LOW (ref 4.7–6.1)
RBC # FLD: 13.9 % — SIGNIFICANT CHANGE UP (ref 11.5–14.5)
SAO2 % BLDA: 96 % — SIGNIFICANT CHANGE UP (ref 94–98)
SODIUM SERPL-SCNC: 141 MMOL/L — SIGNIFICANT CHANGE UP (ref 135–146)
SODIUM SERPL-SCNC: 147 MMOL/L — HIGH (ref 135–146)
WBC # BLD: 10.82 K/UL — HIGH (ref 4.8–10.8)
WBC # FLD AUTO: 10.82 K/UL — HIGH (ref 4.8–10.8)

## 2020-04-09 PROCEDURE — 71045 X-RAY EXAM CHEST 1 VIEW: CPT | Mod: 26

## 2020-04-09 RX ORDER — DEXMEDETOMIDINE HYDROCHLORIDE IN 0.9% SODIUM CHLORIDE 4 UG/ML
0.2 INJECTION INTRAVENOUS
Qty: 200 | Refills: 0 | Status: DISCONTINUED | OUTPATIENT
Start: 2020-04-09 | End: 2020-04-09

## 2020-04-09 RX ORDER — SODIUM CHLORIDE 9 MG/ML
500 INJECTION INTRAMUSCULAR; INTRAVENOUS; SUBCUTANEOUS ONCE
Refills: 0 | Status: COMPLETED | OUTPATIENT
Start: 2020-04-09 | End: 2020-04-09

## 2020-04-09 RX ORDER — INSULIN LISPRO 100/ML
8 VIAL (ML) SUBCUTANEOUS
Refills: 0 | Status: DISCONTINUED | OUTPATIENT
Start: 2020-04-09 | End: 2020-04-09

## 2020-04-09 RX ORDER — INSULIN HUMAN 100 [IU]/ML
4 INJECTION, SOLUTION SUBCUTANEOUS
Qty: 100 | Refills: 0 | Status: DISCONTINUED | OUTPATIENT
Start: 2020-04-09 | End: 2020-04-13

## 2020-04-09 RX ORDER — HEPARIN SODIUM 5000 [USP'U]/ML
750 INJECTION INTRAVENOUS; SUBCUTANEOUS
Qty: 25000 | Refills: 0 | Status: DISCONTINUED | OUTPATIENT
Start: 2020-04-09 | End: 2020-04-10

## 2020-04-09 RX ORDER — HEPARIN SODIUM 5000 [USP'U]/ML
700 INJECTION INTRAVENOUS; SUBCUTANEOUS
Qty: 25000 | Refills: 0 | Status: DISCONTINUED | OUTPATIENT
Start: 2020-04-09 | End: 2020-04-09

## 2020-04-09 RX ORDER — INSULIN GLARGINE 100 [IU]/ML
22 INJECTION, SOLUTION SUBCUTANEOUS AT BEDTIME
Refills: 0 | Status: DISCONTINUED | OUTPATIENT
Start: 2020-04-09 | End: 2020-04-09

## 2020-04-09 RX ORDER — DEXTROSE 50 % IN WATER 50 %
50 SYRINGE (ML) INTRAVENOUS
Refills: 0 | Status: DISCONTINUED | OUTPATIENT
Start: 2020-04-09 | End: 2020-04-14

## 2020-04-09 RX ADMIN — DEXMEDETOMIDINE HYDROCHLORIDE IN 0.9% SODIUM CHLORIDE 4.89 MICROGRAM(S)/KG/HR: 4 INJECTION INTRAVENOUS at 11:45

## 2020-04-09 RX ADMIN — Medication 30 MILLIGRAM(S): at 13:17

## 2020-04-09 RX ADMIN — CHLORHEXIDINE GLUCONATE 15 MILLILITER(S): 213 SOLUTION TOPICAL at 17:34

## 2020-04-09 RX ADMIN — INSULIN HUMAN 4 UNIT(S)/HR: 100 INJECTION, SOLUTION SUBCUTANEOUS at 09:49

## 2020-04-09 RX ADMIN — Medication 325 MILLIGRAM(S): at 05:53

## 2020-04-09 RX ADMIN — CLOPIDOGREL BISULFATE 75 MILLIGRAM(S): 75 TABLET, FILM COATED ORAL at 12:39

## 2020-04-09 RX ADMIN — MIDAZOLAM HYDROCHLORIDE 1.96 MG/KG/HR: 1 INJECTION, SOLUTION INTRAMUSCULAR; INTRAVENOUS at 23:24

## 2020-04-09 RX ADMIN — Medication 60 MILLIGRAM(S): at 05:51

## 2020-04-09 RX ADMIN — Medication 7 UNIT(S): at 06:30

## 2020-04-09 RX ADMIN — Medication 6: at 06:30

## 2020-04-09 RX ADMIN — SODIUM CHLORIDE 1000 MILLILITER(S): 9 INJECTION INTRAMUSCULAR; INTRAVENOUS; SUBCUTANEOUS at 17:41

## 2020-04-09 RX ADMIN — CEFEPIME 100 MILLIGRAM(S): 1 INJECTION, POWDER, FOR SOLUTION INTRAMUSCULAR; INTRAVENOUS at 05:53

## 2020-04-09 RX ADMIN — MORPHINE SULFATE 2 MG/HR: 50 CAPSULE, EXTENDED RELEASE ORAL at 23:24

## 2020-04-09 RX ADMIN — Medication 325 MILLIGRAM(S): at 19:33

## 2020-04-09 RX ADMIN — HEPARIN SODIUM 7.5 UNIT(S)/HR: 5000 INJECTION INTRAVENOUS; SUBCUTANEOUS at 23:24

## 2020-04-09 RX ADMIN — CHLORHEXIDINE GLUCONATE 15 MILLILITER(S): 213 SOLUTION TOPICAL at 05:54

## 2020-04-09 RX ADMIN — Medication 60 MILLIGRAM(S): at 17:33

## 2020-04-09 NOTE — PROGRESS NOTE ADULT - SUBJECTIVE AND OBJECTIVE BOX
Patient is a 74y old  Male who presents with a chief complaint of SOB (08 Apr 2020 10:11)        Over Night Events: remains critically ill and on MV, afebrile        ROS:  See HPI    PHYSICAL EXAM    ICU Vital Signs Last 24 Hrs  T(C): 35.9 (08 Apr 2020 22:00), Max: 36 (08 Apr 2020 16:00)  T(F): 96.7 (08 Apr 2020 22:00), Max: 96.8 (08 Apr 2020 16:00)  HR: 63 (09 Apr 2020 08:00) (49 - 63)  BP: 143/64 (09 Apr 2020 10:00) (111/57 - 143/64)  BP(mean): 92 (09 Apr 2020 10:00) (92 - 96)  ABP: --  ABP(mean): --  RR: 71 (09 Apr 2020 10:00) (14 - 71)  SpO2: 97% (09 Apr 2020 10:00) (93% - 98%)      General: intubated and sedated  HEENT: HU ,ET             Lymphatic system: No cervical LN   Lungs: Bilateral BS  Cardiovascular: Regular   Gastrointestinal: Soft, Positive BS  Extremities: No clubbing.  Moves extremities.  Full Range of motion   Skin: Warm, intact  Neurological: sedated; follows commands off sedation      04-08-20 @ 07:01  -  04-09-20 @ 07:00  --------------------------------------------------------  IN:    heparin Infusion: 99 mL    heparin Infusion: 117 mL    IV PiggyBack: 100 mL    midazolam Infusion: 160 mL    morphine  Infusion.: 46 mL    Oral Fluid: 200 mL    Osmolite: 1200 mL  Total IN: 1922 mL    OUT:    Indwelling Catheter - Urethral: 1390 mL  Total OUT: 1390 mL    Total NET: 532 mL      04-09-20 @ 07:01  -  04-09-20 @ 11:10  --------------------------------------------------------  IN:    heparin Infusion: 18 mL  Total IN: 18 mL    OUT:    Indwelling Catheter - Urethral: 40 mL  Total OUT: 40 mL    Total NET: -22 mL          LABS:                            10.0   10.82 )-----------( 270      ( 09 Apr 2020 04:30 )             31.2                                                 04-09               10.0   10.82 )-----------( 270      ( 04-09 @ 04:30 )             31.2                9.9    11.15 )-----------( 281      ( 04-08 @ 04:30 )             30.8                11.2   16.36 )-----------( 364      ( 04-07 @ 11:05 )             35.6       141  |  104  |  60<H>  ----------------------------<  444<H>  5.2<H>   |  25  |  1.8<H>    09 Apr 2020 04:30    141    |  104    |  60<H>  ----------------------------<  444<H>  5.2<H>   |  25     |  1.8<H>  08 Apr 2020 04:30    143    |  105    |  44<H>  ----------------------------<  188<H>  4.9     |  27     |  1.4      Ca    7.7<L>      09 Apr 2020 04:30  Ca    7.6<L>      08 Apr 2020 04:30    TPro  5.3<L>  /  Alb  2.7<L>  /  TBili  0.5    /  DBili  x      /  AST  28     /  ALT  66<H>  /  AlkPhos  119<H>  09 Apr 2020 04:30  TPro  5.3<L>  /  Alb  2.5<L>  /  TBili  0.7    /  DBili  x      /  AST  53<H>  /  ALT  85<H>  /  AlkPhos  125<H>  08 Apr 2020 04:30    Ca    7.7<L>      09 Apr 2020 04:30    TPro  5.3<L>  /  Alb  2.7<L>  /  TBili  0.5  /  DBili  x   /  AST  28  /  ALT  66<H>  /  AlkPhos  119<H>  04-09      PTT - ( 09 Apr 2020 04:30 )  PTT:82.0 sec                                                                                     LIVER FUNCTIONS - ( 09 Apr 2020 04:30 )  Alb: 2.7 g/dL / Pro: 5.3 g/dL / ALK PHOS: 119 U/L / ALT: 66 U/L / AST: 28 U/L / GGT: x                                                                                               Mode: AC/ CMV (Assist Control/ Continuous Mandatory Ventilation)  RR (machine): 23  TV (machine): 400  FiO2: 60  PEEP: 12  ITime: 1  MAP: 19  PIP: 33                                      ABG - ( 09 Apr 2020 00:17 )  pH, Arterial: 7.40  pH, Blood: x     /  pCO2: 46    /  pO2: 82    / HCO3: 28    / Base Excess: 3.2   /  SaO2: 96                  MEDICATIONS  (STANDING):  chlorhexidine 0.12% Liquid 15 milliLiter(s) Oral Mucosa every 12 hours  clopidogrel Tablet 75 milliGRAM(s) Oral daily  dexMEDEtomidine Infusion 0.2 MICROgram(s)/kG/Hr (4.89 mL/Hr) IV Continuous <Continuous>  dextrose 50% Injectable 50 milliLiter(s) IV Push every 15 minutes  diltiazem    Tablet 30 milliGRAM(s) Oral three times a day  heparin  Infusion 900 Unit(s)/Hr (9 mL/Hr) IV Continuous <Continuous>  insulin regular Infusion 4 Unit(s)/Hr (4 mL/Hr) IV Continuous <Continuous>  methylPREDNISolone sodium succinate Injectable 60 milliGRAM(s) IV Push every 12 hours  metoprolol tartrate 25 milliGRAM(s) Oral two times a day  midazolam Infusion 0.02 mG/kG/Hr (1.96 mL/Hr) IV Continuous <Continuous>  morphine  Infusion. 2 mG/Hr (2 mL/Hr) IV Continuous <Continuous>  sodium bicarbonate 325 milliGRAM(s) Oral two times a day  sodium chloride 0.9% Bolus 500 milliLiter(s) IV Bolus once    MEDICATIONS  (PRN):  acetaminophen   Tablet .. 650 milliGRAM(s) Oral every 6 hours PRN Temp greater or equal to 38C (100.4F)      Xrays:      ETT Okay, TLC okay, OG okay                                                                               ECHO not done

## 2020-04-09 NOTE — PROGRESS NOTE ADULT - ASSESSMENT
IMPRESSION:    Acute Hypoxemic Respiratory Failure S/P Intubation  COVID19 PNA  Afib with RVR    PLAN:    CNS: Sedation with morphine and precedex  HEENT: Oral care    PULMONARY:  HOB @ 45 degrees. Aspiration Precautions . CXR in am . Vent settings : ARDS network protocol . Wean Fio2 . Dec  . Monitor PPL and Driving pressure . Repeat ABG pm. Solumedrol 60 q12    CARDIOVASCULAR: Avoid volume overload . Keep I=O, rate control . AC as per Cardio    GI: GI prophylaxis.  Feeding OG feeding    RENAL:  Follow up lytes.  Correct as needed.     INFECTIOUS DISEASE: Follow up cultures. ABX - no ABX  . Anakinra as per ID    HEMATOLOGICAL:  DVT prophylaxis- on heparin for afib . F/U PTT    ENDOCRINE:  Follow up FS.  Insulin protocol if needed.    MUSCULOSKELETAL: Bedrest    Lines: TLC+     Code status: Full code  guarded prognosis     Disposition: MICU monitoring

## 2020-04-09 NOTE — PROGRESS NOTE ADULT - SUBJECTIVE AND OBJECTIVE BOX
KEZIA MOYER 74y Male  MRN#: [id number]   CODE STATUS:____FULL____      SUBJECTIVE  Patient is a 74y old Male who presents with a chief complaint of SOB (08 Apr 2020 10:11)  Currently admitted to medicine with the primary diagnosis of Pneumonia due to SARS-associated coronavirus    sedated with precedex  FiO2 60%, PEEP 12    OBJECTIVE  PAST MEDICAL & SURGICAL HISTORY  CAD (coronary artery disease)  Kidney stones  High cholesterol  HTN (hypertension)  S/P drug eluting coronary stent placement: &gt; 10 years ago    ALLERGIES:  No Known Allergies    MEDICATIONS:  STANDING MEDICATIONS  cefepime   IVPB 2000 milliGRAM(s) IV Intermittent every 12 hours  chlorhexidine 0.12% Liquid 15 milliLiter(s) Oral Mucosa every 12 hours  clopidogrel Tablet 75 milliGRAM(s) Oral daily  dexMEDEtomidine Infusion 0.2 MICROgram(s)/kG/Hr IV Continuous <Continuous>  dextrose 50% Injectable 50 milliLiter(s) IV Push every 15 minutes  diltiazem    Tablet 30 milliGRAM(s) Oral three times a day  heparin  Infusion 900 Unit(s)/Hr IV Continuous <Continuous>  insulin regular Infusion 4 Unit(s)/Hr IV Continuous <Continuous>  methylPREDNISolone sodium succinate Injectable 60 milliGRAM(s) IV Push every 12 hours  metoprolol tartrate 25 milliGRAM(s) Oral two times a day  midazolam Infusion 0.02 mG/kG/Hr IV Continuous <Continuous>  morphine  Infusion. 2 mG/Hr IV Continuous <Continuous>  sodium bicarbonate 325 milliGRAM(s) Oral two times a day  sodium chloride 0.9% Bolus 500 milliLiter(s) IV Bolus once    PRN MEDICATIONS  acetaminophen   Tablet .. 650 milliGRAM(s) Oral every 6 hours PRN      VITAL SIGNS: Last 24 Hours  T(C): 35.9 (08 Apr 2020 22:00), Max: 36 (08 Apr 2020 16:00)  T(F): 96.7 (08 Apr 2020 22:00), Max: 96.8 (08 Apr 2020 16:00)  HR: 63 (09 Apr 2020 08:00) (49 - 63)  BP: 137/67 (09 Apr 2020 08:00) (111/57 - 141/70)  BP(mean): 96 (09 Apr 2020 08:00) (96 - 96)  RR: 23 (09 Apr 2020 02:00) (14 - 24)  SpO2: 95% (09 Apr 2020 08:00) (93% - 98%)    LABS:                        10.0   10.82 )-----------( 270      ( 09 Apr 2020 04:30 )             31.2     04-09    141  |  104  |  60<H>  ----------------------------<  444<H>  5.2<H>   |  25  |  1.8<H>    Ca    7.7<L>      09 Apr 2020 04:30    TPro  5.3<L>  /  Alb  2.7<L>  /  TBili  0.5  /  DBili  x   /  AST  28  /  ALT  66<H>  /  AlkPhos  119<H>  04-09    PTT - ( 09 Apr 2020 04:30 )  PTT:82.0 sec    ABG - ( 09 Apr 2020 00:17 )  pH, Arterial: 7.40  pH, Blood: x     /  pCO2: 46    /  pO2: 82    / HCO3: 28    / Base Excess: 3.2   /  SaO2: 96                        RADIOLOGY:      PHYSICAL EXAM:    GENERAL: intubated and sedated  HEENT:  Atraumatic, Normocephalic. EOMI, PERRLA, conjunctiva and sclera clear, No JVD  PULMONARY: poor air entry b/l, no wheezing/rhonchi/rales  CARDIOVASCULAR: Regular rate and rhythm; No murmurs, rubs, or gallops  GASTROINTESTINAL: Soft, Nontender, Nondistended; Bowel sounds present  MUSCULOSKELETAL:  2+ Peripheral Pulses, No clubbing, cyanosis, or edema  NEUROLOGY: non-focal  SKIN: No rashes or lesions      ADMISSION SUMMARY  Patient is a 74y old Male who presents with a chief complaint of SOB (06 Apr 2020 09:28)  Currently admitted to medicine with the primary diagnosis of Pneumonia due to SARS-associated coronavirus      ASSESSMENT & PLAN    73 YO M with a PMH of HTN, HLD, CAD s/p stent, and 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  In the ED, hypoxic and put on 5L NC.  CXR with extensive B/L interstitial opacities concerning for viral pneumonia.  Admitted for suspected COVID-19 infection.  Patient upgraded to the ICU on 4/3 for increasing oxygen requirements on 100% NRB and increased work of breathing.       #Acute hypoxic respiratory failure 2/2 SARS COV2 pneumonia with cytokine release syndrome  -Covid-19 +  -afebrile, vitals stable  -intubated on 4/8 for worsening respiratory failure  -wean FiO2 as tolerated  -sedated with precedex  -CXR with diffuse b/l opacities-improving  -ID following  -s/p plaquenil, azithromycin, and anakinra  -cont solumedrol 60 q12 (started 4/5)  -crp 3, procal .14  -LDH, ferritin, d-dimer elevated  -keep neutral balance  -d/darrell cefepime (procal not rising- as per ID)  -follow up CXR in AM  -repeat ABG in afternoon    #Afib with RVR  -rate controlled  -cardizem 30 mg TID  -lopressor 25 mg BID  -A/C with heparin, monitor PTTs    #KEEGAN on 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 Kleiner    #HTN  -cont metoprolol    #CAD s/p stent  -cont plavix, metoprolol    #DLD  -holding statin as LFTs>100    # DM  -monitor fs  -insulin drip started today due to elevated sugars    Diet tube feeds  DVT ppx heparin  Dispo- acute   Updated plan with Dr. Davila

## 2020-04-09 NOTE — CHART NOTE - NSCHARTNOTEFT_GEN_A_CORE
Communications Team, critical care unit    Called patient's son Adan ([phone]) to give update on patient's clinical course.  Patient's son was informed that patient continue to be on mechanical ventilation, condition continues to be critical and is closely monitored by the ICU team. Patient’s son however asked that the ICU team calls him and leave a message on ([phone]) to inform him about patient’s current vent settings. Patient’s son was informed that this information will be passed along to the ICU team.  At the request of Dr Davila, a message was left for patient’s other children Mari ([phone]) and Beltran ([phone]) about patient’s clinical course .

## 2020-04-09 NOTE — PROGRESS NOTE ADULT - SUBJECTIVE AND OBJECTIVE BOX
KEZIA MOYER  74y, Male  Allergy: No Known Allergies      LOS  14d    CHIEF COMPLAINT: SOB (09 Apr 2020 11:10)      INTERVAL EVENTS/HPI  - T(F): , Max: 96.8 (04-08-20 @ 16:00)  - WBC Count: 10.82 (04-09-20 @ 04:30)  WBC Count: 11.15 (04-08-20 @ 04:30)  - Creatinine, Serum: 1.8 (04-09-20 @ 04:30)  Creatinine, Serum: 1.4 (04-08-20 @ 04:30)       ROS  unable to obtain history secondary to patient's mental status and/or sedation     VITALS:  T(F): 96.7, Max: 96.8 (04-08-20 @ 16:00)  HR: 56  BP: 140/65  RR: 24Vital Signs Last 24 Hrs  T(C): 35.9 (08 Apr 2020 22:00), Max: 36 (08 Apr 2020 16:00)  T(F): 96.7 (08 Apr 2020 22:00), Max: 96.8 (08 Apr 2020 16:00)  HR: 56 (09 Apr 2020 13:30) (51 - 64)  BP: 140/65 (09 Apr 2020 13:30) (108/59 - 143/64)  BP(mean): 94 (09 Apr 2020 13:30) (81 - 96)  RR: 24 (09 Apr 2020 13:30) (14 - 26)  SpO2: 95% (09 Apr 2020 13:30) (93% - 98%)    PHYSICAL EXAM:  General: intubated  HEENT: NCAT  CV: RRR  Lungs: symmetric chest expansion  Skin: no rash  Neuro: sedated  Lines     FH: Non-contributory  Social Hx: Non-contributory    TESTS & MEASUREMENTS:                        10.0   10.82 )-----------( 270      ( 09 Apr 2020 04:30 )             31.2     04-09    141  |  104  |  60<H>  ----------------------------<  444<H>  5.2<H>   |  25  |  1.8<H>    Ca    7.7<L>      09 Apr 2020 04:30    TPro  5.3<L>  /  Alb  2.7<L>  /  TBili  0.5  /  DBili  x   /  AST  28  /  ALT  66<H>  /  AlkPhos  119<H>  04-09    eGFR if Non African American: 37 mL/min/1.73M2 (04-09-20 @ 04:30)  eGFR if African American: 42 mL/min/1.73M2 (04-09-20 @ 04:30)    LIVER FUNCTIONS - ( 09 Apr 2020 04:30 )  Alb: 2.7 g/dL / Pro: 5.3 g/dL / ALK PHOS: 119 U/L / ALT: 66 U/L / AST: 28 U/L / GGT: x                     INFECTIOUS DISEASES TESTING  Procalcitonin, Serum: 0.14 (04-08-20 @ 04:30)  Procalcitonin, Serum: 0.14 (04-06-20 @ 11:17)  Procalcitonin, Serum: 0.15 (04-06-20 @ 05:42)  Procalcitonin, Serum: 0.25 (04-04-20 @ 10:02)  Procalcitonin, Serum: 0.29 (04-03-20 @ 06:48)  Procalcitonin, Serum: 0.22 (04-01-20 @ 06:25)  Procalcitonin, Serum: 0.22 (03-31-20 @ 04:30)  Procalcitonin, Serum: 0.28 (03-26-20 @ 06:10)  COVID-19 PCR: Detected (03-25-20 @ 23:27)  Flu B Result: Negative (03-25-20 @ 23:16)  RSV Result: Negative (03-25-20 @ 23:16)  Flu A Result: Negative (03-25-20 @ 23:16)      INFLAMMATORY MARKERS  C-Reactive Protein, Serum: 1.46 mg/dL (04-08-20 @ 04:30)  C-Reactive Protein, Serum: 3.76 mg/dL (04-06-20 @ 11:17)  C-Reactive Protein, Serum: 4.75 mg/dL (04-06-20 @ 05:42)  C-Reactive Protein, Serum: 13.35 mg/dL (04-04-20 @ 10:02)      RADIOLOGY & ADDITIONAL TESTS:  I have personally reviewed the last available Chest xray  CXR      CT      CARDIOLOGY TESTING  12 Lead ECG:   Ventricular Rate 69 BPM    Atrial Rate 69 BPM    P-R Interval 146 ms    QRS Duration 86 ms    Q-T Interval 500 ms    QTC Calculation(Bezet) 535 ms    P Axis 52 degrees    R Axis 63 degrees    T Axis 69 degrees    Diagnosis Line Normal sinus rhythm  Increased R/S ratio in V1, consider early transition or posterior infarct  Prolonged QT  Abnormal ECG    Confirmed by Scooter Ortega ([id number]) on 4/8/2020 11:30:53 AM (04-08-20 @ 10:05)  12 Lead ECG:   Ventricular Rate 125 BPM    Atrial Rate 250 BPM    QRS Duration 78 ms    Q-T Interval 344 ms    QTC Calculation(Bezet) 496 ms    R Axis 48 degrees    T Axis 2 degrees    Diagnosis Line Atrial flutter with variable A-V block with premature ventricular or  aberrantly conducted complexes  Abnormal ECG    Confirmed by Scooter Ortega [id number]) on 4/6/2020 9:40:06 PM (04-04-20 @ 05:06)      MEDICATIONS  chlorhexidine 0.12% Liquid 15  clopidogrel Tablet 75  dexMEDEtomidine Infusion 0.2  dextrose 50% Injectable 50  diltiazem    Tablet 30  heparin  Infusion 900  insulin regular Infusion 4  methylPREDNISolone sodium succinate Injectable 60  metoprolol tartrate 25  midazolam Infusion 0.02  morphine  Infusion. 2  sodium bicarbonate 325  sodium chloride 0.9% Bolus 500      WEIGHT  Weight (kg): 97.8 (04-02-20 @ 14:43)  Creatinine, Serum: 1.8 mg/dL (04-09-20 @ 04:30)      ANTIBIOTICS:      All available historical records have been reviewed

## 2020-04-09 NOTE — PROGRESS NOTE ADULT - ASSESSMENT
74y old Male who presents with a chief complaint of cough x 1 week  Currently admitted to medicine with the primary diagnosis of Acute hypoxic respiratory failure secondary to SARS COv2 pneumonia    IMPRESSION:  # COVID 19 PNA, intubated 4/7    CXR with diffuse bilateral opacities , white out-- now decreased on L     Procalcitonin, Serum: 0.14 (04-08-20 @ 04:30)  #Cytokine Release Syndrome   C-Reactive Protein, Serum: 1.46 mg/dL (04-08-20 @ 04:30)  #hyponatremia    RECOMMENDATIONS:  - s/p azithro/plaqeunil/ Anakinra 100mg SC q6h x 3 days   - on steroids   - on heparin gtt  - If fever, repeat procalcitonin     Spectra [id number]

## 2020-04-09 NOTE — CHART NOTE - NSCHARTNOTEFT_GEN_A_CORE
Registered Dietitian Follow-Up     Patient Profile Reviewed                           Yes [x]   No []     Nutrition History Previously Obtained        Yes [x]  No []       Pertinent Subjective Information:  -Pt saturating 80% on NRB in prone position on  so decision made to intubate. Pt intubated & sedated. MAP:92. Feeds initiated yesterday per LIP and tolerating at goal rate per RN. No Bm since  but no other GI s/s intolerance. Recommend less concentrated formula in pt with KEEGAN. Recs at end of document d/w LIP (Dr. Carlson).      Pertinent Medical Interventions:  1. Acute hypoxic respiratory failure  COVID-19 PNA with cytokine release syndrome  --intubated . sedated with precedex. afebrile, vitals stable.   --b/l opacities improving. Repeat ABG in afternoon.   2. Afib with RVR, rate controlled   3. 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 s/p 500cc bolus & recheck BMP @4pm. possible need for lasix   4. DM, insulin drip started today      Diet order: Osmolite 1.5 @ 300mL q6H (1800kcal, 75g protein, 912mL free H2O)      Anthropometrics:  - Ht. 170.18cm   - Wt. 96.6kg ()--relatively stable to 97.8kg () dosing wt   - BMI 33.8   - IBW 67.2kg      Pertinent Lab Data: (2020) WBC 10.82, RBC 3.37, H/H 10.0/31.2, POCT 416/401/432, glucose 444, BUN 60, Cr 1.8, corrected Ca 8.74, GFR 37     Pertinent Meds: heparin infusion, plavix, insulin infusion, precedex, solumedrol, versed, morphine, cardizem, sodium bicarbonate      Physical Findings:  - Appearance: AAO   - GI function: constipation improved with BM . soft abdomen noted   - Tubes: OGT   - Oral/Mouth cavity: NPO  - Skin: intact. 1+ edema b/l feet     Nutrition Requirements  Weight Used: 97.8kg dosing wt; 67.2kg IBW *updated d/t intubation*      Estimated Energy Needs   Adjust [x] (obtained comparin0805-1395 (60-70% PSE  (1750) Ve: 8.0 Tmax: 36.8) vs 3217-2157 (11-14 kcal/kg ABW) vs 4618-6607 (22-25 kcal/kg IBW)).    Adjusted Energy Recommendations:  1200-1450kcal/day      Estimated Protein Needs   Adjust [x]  (1.5-2.0 g/kg IBW considering intubation and KEGEAN on CKD).   Adjusted Protein Recommendations: 101-134 g/day --aim toward lower end. will adjust as renal fxn improves      Estimated Fluid Needs       Adjust [x] per critical care team      Nutrient Intake: current TF providing 124% kcal and 74% protein needs      [] Previous Nutrition Diagnosis: inadequate oral intake             [] Ongoing          [x] Resolved    Nutrition Diagnosis #1  P: Excessive energy intake  E: intubation   S: current TF regimen meets 124% est. kcal needs    Nutrition Diagnosis #2  P: Inadequate protein intake  E: intubation  S: current TF regimen meets 74% est. protein needs     Nutrition Intervention: enteral nutrition   Recommend:  1. Adjust current TF regimen to less concentrated formula. Recommend adjust TF regimen to Peptamen AF @ 300mL q6H + 2packs/day Beneprotein. TF at goal rate will provide 1490kcal, 102g protein, 972mL free H2O and  96% RDIs. Flushes per critical care team. Maintain aspiration precautions.   2. Consider bowel regimen as no BM x5 days. Ensure adequate hydration.      Goal/Expected Outcome: TF to provide >85% but <105% estimated needs upon f/u in 4 days.      Indicator/Monitoring: RD to monitor energy intake, diet order body comp, NFPF

## 2020-04-10 LAB
ALBUMIN SERPL ELPH-MCNC: 2.8 G/DL — LOW (ref 3.5–5.2)
ALP SERPL-CCNC: 139 U/L — HIGH (ref 30–115)
ALT FLD-CCNC: 60 U/L — HIGH (ref 0–41)
ANION GAP SERPL CALC-SCNC: 12 MMOL/L — SIGNIFICANT CHANGE UP (ref 7–14)
ANION GAP SERPL CALC-SCNC: 12 MMOL/L — SIGNIFICANT CHANGE UP (ref 7–14)
ANION GAP SERPL CALC-SCNC: 15 MMOL/L — HIGH (ref 7–14)
APTT BLD: 90.6 SEC — CRITICAL HIGH (ref 27–39.2)
APTT BLD: 90.9 SEC — CRITICAL HIGH (ref 27–39.2)
APTT BLD: 94.7 SEC — CRITICAL HIGH (ref 27–39.2)
APTT BLD: 95.7 SEC — CRITICAL HIGH (ref 27–39.2)
AST SERPL-CCNC: 33 U/L — SIGNIFICANT CHANGE UP (ref 0–41)
BASE EXCESS BLDA CALC-SCNC: 2.9 MMOL/L — HIGH (ref -2–2)
BASOPHILS # BLD AUTO: 0.01 K/UL — SIGNIFICANT CHANGE UP (ref 0–0.2)
BASOPHILS NFR BLD AUTO: 0.1 % — SIGNIFICANT CHANGE UP (ref 0–1)
BILIRUB SERPL-MCNC: 0.4 MG/DL — SIGNIFICANT CHANGE UP (ref 0.2–1.2)
BUN SERPL-MCNC: 67 MG/DL — CRITICAL HIGH (ref 10–20)
BUN SERPL-MCNC: 79 MG/DL — CRITICAL HIGH (ref 10–20)
BUN SERPL-MCNC: 79 MG/DL — CRITICAL HIGH (ref 10–20)
CALCIUM SERPL-MCNC: 7.6 MG/DL — LOW (ref 8.5–10.1)
CALCIUM SERPL-MCNC: 8 MG/DL — LOW (ref 8.5–10.1)
CALCIUM SERPL-MCNC: 8.4 MG/DL — LOW (ref 8.5–10.1)
CHLORIDE SERPL-SCNC: 108 MMOL/L — SIGNIFICANT CHANGE UP (ref 98–110)
CHLORIDE SERPL-SCNC: 109 MMOL/L — SIGNIFICANT CHANGE UP (ref 98–110)
CHLORIDE SERPL-SCNC: 109 MMOL/L — SIGNIFICANT CHANGE UP (ref 98–110)
CO2 SERPL-SCNC: 22 MMOL/L — SIGNIFICANT CHANGE UP (ref 17–32)
CO2 SERPL-SCNC: 25 MMOL/L — SIGNIFICANT CHANGE UP (ref 17–32)
CO2 SERPL-SCNC: 25 MMOL/L — SIGNIFICANT CHANGE UP (ref 17–32)
CREAT SERPL-MCNC: 1.8 MG/DL — HIGH (ref 0.7–1.5)
CREAT SERPL-MCNC: 2.1 MG/DL — HIGH (ref 0.7–1.5)
CREAT SERPL-MCNC: 2.1 MG/DL — HIGH (ref 0.7–1.5)
D DIMER BLD IA.RAPID-MCNC: 3588 NG/ML DDU — HIGH (ref 0–230)
EOSINOPHIL # BLD AUTO: 0 K/UL — SIGNIFICANT CHANGE UP (ref 0–0.7)
EOSINOPHIL NFR BLD AUTO: 0 % — SIGNIFICANT CHANGE UP (ref 0–8)
FIBRINOGEN PPP-MCNC: 316 MG/DL — SIGNIFICANT CHANGE UP (ref 204.4–570.6)
GLUCOSE BLDC GLUCOMTR-MCNC: 131 MG/DL — HIGH (ref 70–99)
GLUCOSE BLDC GLUCOMTR-MCNC: 153 MG/DL — HIGH (ref 70–99)
GLUCOSE BLDC GLUCOMTR-MCNC: 154 MG/DL — HIGH (ref 70–99)
GLUCOSE BLDC GLUCOMTR-MCNC: 155 MG/DL — HIGH (ref 70–99)
GLUCOSE BLDC GLUCOMTR-MCNC: 155 MG/DL — HIGH (ref 70–99)
GLUCOSE BLDC GLUCOMTR-MCNC: 158 MG/DL — HIGH (ref 70–99)
GLUCOSE BLDC GLUCOMTR-MCNC: 159 MG/DL — HIGH (ref 70–99)
GLUCOSE BLDC GLUCOMTR-MCNC: 165 MG/DL — HIGH (ref 70–99)
GLUCOSE BLDC GLUCOMTR-MCNC: 168 MG/DL — HIGH (ref 70–99)
GLUCOSE BLDC GLUCOMTR-MCNC: 172 MG/DL — HIGH (ref 70–99)
GLUCOSE BLDC GLUCOMTR-MCNC: 174 MG/DL — HIGH (ref 70–99)
GLUCOSE BLDC GLUCOMTR-MCNC: 180 MG/DL — HIGH (ref 70–99)
GLUCOSE SERPL-MCNC: 151 MG/DL — HIGH (ref 70–99)
GLUCOSE SERPL-MCNC: 174 MG/DL — HIGH (ref 70–99)
GLUCOSE SERPL-MCNC: 208 MG/DL — HIGH (ref 70–99)
HCO3 BLDA-SCNC: 28 MMOL/L — SIGNIFICANT CHANGE UP (ref 21–29)
HCT VFR BLD CALC: 31.3 % — LOW (ref 42–52)
HCT VFR BLD CALC: 32 % — LOW (ref 42–52)
HCT VFR BLD CALC: 34.5 % — LOW (ref 42–52)
HGB BLD-MCNC: 10.4 G/DL — LOW (ref 14–18)
HGB BLD-MCNC: 10.8 G/DL — LOW (ref 14–18)
HGB BLD-MCNC: 9.9 G/DL — LOW (ref 14–18)
HOROWITZ INDEX BLDA+IHG-RTO: 21 — SIGNIFICANT CHANGE UP
IMM GRANULOCYTES NFR BLD AUTO: 1.7 % — HIGH (ref 0.1–0.3)
INR BLD: 1.1 RATIO — SIGNIFICANT CHANGE UP (ref 0.65–1.3)
LYMPHOCYTES # BLD AUTO: 1.08 K/UL — LOW (ref 1.2–3.4)
LYMPHOCYTES # BLD AUTO: 8.2 % — LOW (ref 20.5–51.1)
MAGNESIUM SERPL-MCNC: 3 MG/DL — HIGH (ref 1.8–2.4)
MAGNESIUM SERPL-MCNC: 3.1 MG/DL — CRITICAL HIGH (ref 1.8–2.4)
MAGNESIUM SERPL-MCNC: 3.3 MG/DL — CRITICAL HIGH (ref 1.8–2.4)
MCHC RBC-ENTMCNC: 29.1 PG — SIGNIFICANT CHANGE UP (ref 27–31)
MCHC RBC-ENTMCNC: 29.1 PG — SIGNIFICANT CHANGE UP (ref 27–31)
MCHC RBC-ENTMCNC: 30.1 PG — SIGNIFICANT CHANGE UP (ref 27–31)
MCHC RBC-ENTMCNC: 31.3 G/DL — LOW (ref 32–37)
MCHC RBC-ENTMCNC: 31.6 G/DL — LOW (ref 32–37)
MCHC RBC-ENTMCNC: 32.5 G/DL — SIGNIFICANT CHANGE UP (ref 32–37)
MCV RBC AUTO: 92.1 FL — SIGNIFICANT CHANGE UP (ref 80–94)
MCV RBC AUTO: 92.5 FL — SIGNIFICANT CHANGE UP (ref 80–94)
MCV RBC AUTO: 93 FL — SIGNIFICANT CHANGE UP (ref 80–94)
MONOCYTES # BLD AUTO: 1.14 K/UL — HIGH (ref 0.1–0.6)
MONOCYTES NFR BLD AUTO: 8.6 % — SIGNIFICANT CHANGE UP (ref 1.7–9.3)
NEUTROPHILS # BLD AUTO: 10.79 K/UL — HIGH (ref 1.4–6.5)
NEUTROPHILS NFR BLD AUTO: 81.4 % — HIGH (ref 42.2–75.2)
NRBC # BLD: 0 /100 WBCS — SIGNIFICANT CHANGE UP (ref 0–0)
NT-PROBNP SERPL-SCNC: 1210 PG/ML — HIGH (ref 0–300)
PCO2 BLDA: 45 MMHG — HIGH (ref 38–42)
PH BLDA: 7.41 — SIGNIFICANT CHANGE UP (ref 7.38–7.42)
PHOSPHATE SERPL-MCNC: 3.8 MG/DL — SIGNIFICANT CHANGE UP (ref 2.1–4.9)
PHOSPHATE SERPL-MCNC: 3.8 MG/DL — SIGNIFICANT CHANGE UP (ref 2.1–4.9)
PHOSPHATE SERPL-MCNC: 4.5 MG/DL — SIGNIFICANT CHANGE UP (ref 2.1–4.9)
PLATELET # BLD AUTO: 263 K/UL — SIGNIFICANT CHANGE UP (ref 130–400)
PLATELET # BLD AUTO: 269 K/UL — SIGNIFICANT CHANGE UP (ref 130–400)
PLATELET # BLD AUTO: 284 K/UL — SIGNIFICANT CHANGE UP (ref 130–400)
PO2 BLDA: 76 MMHG — LOW (ref 78–95)
POTASSIUM SERPL-MCNC: 4.9 MMOL/L — SIGNIFICANT CHANGE UP (ref 3.5–5)
POTASSIUM SERPL-MCNC: 5 MMOL/L — SIGNIFICANT CHANGE UP (ref 3.5–5)
POTASSIUM SERPL-MCNC: 5.4 MMOL/L — HIGH (ref 3.5–5)
POTASSIUM SERPL-SCNC: 4.9 MMOL/L — SIGNIFICANT CHANGE UP (ref 3.5–5)
POTASSIUM SERPL-SCNC: 5 MMOL/L — SIGNIFICANT CHANGE UP (ref 3.5–5)
POTASSIUM SERPL-SCNC: 5.4 MMOL/L — HIGH (ref 3.5–5)
PROCALCITONIN SERPL-MCNC: 0.15 NG/ML — HIGH (ref 0.02–0.1)
PROT SERPL-MCNC: 5.6 G/DL — LOW (ref 6–8)
PROTHROM AB SERPL-ACNC: 12.6 SEC — SIGNIFICANT CHANGE UP (ref 9.95–12.87)
RBC # BLD: 3.4 M/UL — LOW (ref 4.7–6.1)
RBC # BLD: 3.46 M/UL — LOW (ref 4.7–6.1)
RBC # BLD: 3.71 M/UL — LOW (ref 4.7–6.1)
RBC # FLD: 14.1 % — SIGNIFICANT CHANGE UP (ref 11.5–14.5)
RBC # FLD: 14.2 % — SIGNIFICANT CHANGE UP (ref 11.5–14.5)
RBC # FLD: 14.3 % — SIGNIFICANT CHANGE UP (ref 11.5–14.5)
SAO2 % BLDA: 94 % — SIGNIFICANT CHANGE UP (ref 92–96)
SODIUM SERPL-SCNC: 145 MMOL/L — SIGNIFICANT CHANGE UP (ref 135–146)
SODIUM SERPL-SCNC: 146 MMOL/L — SIGNIFICANT CHANGE UP (ref 135–146)
SODIUM SERPL-SCNC: 146 MMOL/L — SIGNIFICANT CHANGE UP (ref 135–146)
WBC # BLD: 13.25 K/UL — HIGH (ref 4.8–10.8)
WBC # BLD: 13.84 K/UL — HIGH (ref 4.8–10.8)
WBC # BLD: 19.08 K/UL — HIGH (ref 4.8–10.8)
WBC # FLD AUTO: 13.25 K/UL — HIGH (ref 4.8–10.8)
WBC # FLD AUTO: 13.84 K/UL — HIGH (ref 4.8–10.8)
WBC # FLD AUTO: 19.08 K/UL — HIGH (ref 4.8–10.8)

## 2020-04-10 PROCEDURE — 99291 CRITICAL CARE FIRST HOUR: CPT

## 2020-04-10 PROCEDURE — 93010 ELECTROCARDIOGRAM REPORT: CPT

## 2020-04-10 PROCEDURE — 71045 X-RAY EXAM CHEST 1 VIEW: CPT | Mod: 26,CS

## 2020-04-10 RX ORDER — CALCIUM GLUCONATE 100 MG/ML
1 VIAL (ML) INTRAVENOUS ONCE
Refills: 0 | Status: COMPLETED | OUTPATIENT
Start: 2020-04-10 | End: 2020-04-10

## 2020-04-10 RX ORDER — CHLORHEXIDINE GLUCONATE 213 G/1000ML
1 SOLUTION TOPICAL
Refills: 0 | Status: DISCONTINUED | OUTPATIENT
Start: 2020-04-10 | End: 2020-04-19

## 2020-04-10 RX ORDER — HEPARIN SODIUM 5000 [USP'U]/ML
600 INJECTION INTRAVENOUS; SUBCUTANEOUS
Qty: 25000 | Refills: 0 | Status: DISCONTINUED | OUTPATIENT
Start: 2020-04-10 | End: 2020-04-10

## 2020-04-10 RX ORDER — HEPARIN SODIUM 5000 [USP'U]/ML
550 INJECTION INTRAVENOUS; SUBCUTANEOUS
Qty: 25000 | Refills: 0 | Status: DISCONTINUED | OUTPATIENT
Start: 2020-04-10 | End: 2020-04-12

## 2020-04-10 RX ORDER — LACTULOSE 10 G/15ML
10 SOLUTION ORAL THREE TIMES A DAY
Refills: 0 | Status: DISCONTINUED | OUTPATIENT
Start: 2020-04-10 | End: 2020-04-14

## 2020-04-10 RX ORDER — SODIUM CHLORIDE 9 MG/ML
250 INJECTION, SOLUTION INTRAVENOUS ONCE
Refills: 0 | Status: COMPLETED | OUTPATIENT
Start: 2020-04-10 | End: 2020-04-10

## 2020-04-10 RX ORDER — SENNA PLUS 8.6 MG/1
2 TABLET ORAL AT BEDTIME
Refills: 0 | Status: DISCONTINUED | OUTPATIENT
Start: 2020-04-10 | End: 2020-04-19

## 2020-04-10 RX ORDER — MIDAZOLAM HYDROCHLORIDE 1 MG/ML
2 INJECTION, SOLUTION INTRAMUSCULAR; INTRAVENOUS ONCE
Refills: 0 | Status: DISCONTINUED | OUTPATIENT
Start: 2020-04-10 | End: 2020-04-10

## 2020-04-10 RX ORDER — FAMOTIDINE 10 MG/ML
20 INJECTION INTRAVENOUS DAILY
Refills: 0 | Status: DISCONTINUED | OUTPATIENT
Start: 2020-04-09 | End: 2020-04-19

## 2020-04-10 RX ORDER — LABETALOL HCL 100 MG
10 TABLET ORAL ONCE
Refills: 0 | Status: COMPLETED | OUTPATIENT
Start: 2020-04-10 | End: 2020-04-10

## 2020-04-10 RX ORDER — FENTANYL CITRATE 50 UG/ML
25 INJECTION INTRAVENOUS EVERY 4 HOURS
Refills: 0 | Status: DISCONTINUED | OUTPATIENT
Start: 2020-04-10 | End: 2020-04-10

## 2020-04-10 RX ORDER — DEXTROSE 10 % IN WATER 10 %
250 INTRAVENOUS SOLUTION INTRAVENOUS
Refills: 0 | Status: DISCONTINUED | OUTPATIENT
Start: 2020-04-10 | End: 2020-04-12

## 2020-04-10 RX ORDER — INSULIN HUMAN 100 [IU]/ML
10 INJECTION, SOLUTION SUBCUTANEOUS ONCE
Refills: 0 | Status: DISCONTINUED | OUTPATIENT
Start: 2020-04-10 | End: 2020-04-10

## 2020-04-10 RX ORDER — INSULIN HUMAN 100 [IU]/ML
10 INJECTION, SOLUTION SUBCUTANEOUS ONCE
Refills: 0 | Status: COMPLETED | OUTPATIENT
Start: 2020-04-10 | End: 2020-04-10

## 2020-04-10 RX ORDER — HEPARIN SODIUM 5000 [USP'U]/ML
650 INJECTION INTRAVENOUS; SUBCUTANEOUS
Qty: 25000 | Refills: 0 | Status: DISCONTINUED | OUTPATIENT
Start: 2020-04-10 | End: 2020-04-10

## 2020-04-10 RX ORDER — CLEVIDIPINE BUTYRATE 50MG/100ML
1 VIAL (ML) INTRAVENOUS
Qty: 25 | Refills: 0 | Status: DISCONTINUED | OUTPATIENT
Start: 2020-04-10 | End: 2020-04-16

## 2020-04-10 RX ORDER — MORPHINE SULFATE 50 MG/1
2 CAPSULE, EXTENDED RELEASE ORAL
Qty: 100 | Refills: 0 | Status: DISCONTINUED | OUTPATIENT
Start: 2020-04-10 | End: 2020-04-10

## 2020-04-10 RX ADMIN — INSULIN HUMAN 4 UNIT(S)/HR: 100 INJECTION, SOLUTION SUBCUTANEOUS at 14:32

## 2020-04-10 RX ADMIN — SODIUM CHLORIDE 750 MILLILITER(S): 9 INJECTION, SOLUTION INTRAVENOUS at 11:00

## 2020-04-10 RX ADMIN — Medication 2 MG/HR: at 21:11

## 2020-04-10 RX ADMIN — LACTULOSE 10 GRAM(S): 10 SOLUTION ORAL at 21:11

## 2020-04-10 RX ADMIN — SENNA PLUS 2 TABLET(S): 8.6 TABLET ORAL at 21:10

## 2020-04-10 RX ADMIN — CHLORHEXIDINE GLUCONATE 15 MILLILITER(S): 213 SOLUTION TOPICAL at 17:20

## 2020-04-10 RX ADMIN — Medication 2 MG/HR: at 15:34

## 2020-04-10 RX ADMIN — MIDAZOLAM HYDROCHLORIDE 1.96 MG/KG/HR: 1 INJECTION, SOLUTION INTRAMUSCULAR; INTRAVENOUS at 05:39

## 2020-04-10 RX ADMIN — CHLORHEXIDINE GLUCONATE 1 APPLICATION(S): 213 SOLUTION TOPICAL at 04:23

## 2020-04-10 RX ADMIN — Medication 10 MILLIGRAM(S): at 12:04

## 2020-04-10 RX ADMIN — MIDAZOLAM HYDROCHLORIDE 1.96 MG/KG/HR: 1 INJECTION, SOLUTION INTRAMUSCULAR; INTRAVENOUS at 20:12

## 2020-04-10 RX ADMIN — MIDAZOLAM HYDROCHLORIDE 2 MILLIGRAM(S): 1 INJECTION, SOLUTION INTRAMUSCULAR; INTRAVENOUS at 03:43

## 2020-04-10 RX ADMIN — Medication 325 MILLIGRAM(S): at 17:20

## 2020-04-10 RX ADMIN — HEPARIN SODIUM 550 UNIT(S)/HR: 5000 INJECTION INTRAVENOUS; SUBCUTANEOUS at 15:35

## 2020-04-10 RX ADMIN — INSULIN HUMAN 10 UNIT(S): 100 INJECTION, SOLUTION SUBCUTANEOUS at 20:10

## 2020-04-10 RX ADMIN — LACTULOSE 10 GRAM(S): 10 SOLUTION ORAL at 10:51

## 2020-04-10 RX ADMIN — CHLORHEXIDINE GLUCONATE 15 MILLILITER(S): 213 SOLUTION TOPICAL at 04:23

## 2020-04-10 RX ADMIN — Medication 100 GRAM(S): at 20:10

## 2020-04-10 RX ADMIN — Medication 325 MILLIGRAM(S): at 04:24

## 2020-04-10 RX ADMIN — LACTULOSE 10 GRAM(S): 10 SOLUTION ORAL at 14:32

## 2020-04-10 RX ADMIN — SENNA PLUS 2 TABLET(S): 8.6 TABLET ORAL at 04:25

## 2020-04-10 RX ADMIN — SODIUM CHLORIDE 500 MILLILITER(S): 9 INJECTION, SOLUTION INTRAVENOUS at 02:54

## 2020-04-10 RX ADMIN — Medication 10 MILLIGRAM(S): at 14:31

## 2020-04-10 RX ADMIN — FAMOTIDINE 20 MILLIGRAM(S): 10 INJECTION INTRAVENOUS at 11:41

## 2020-04-10 RX ADMIN — Medication 60 MILLIGRAM(S): at 04:24

## 2020-04-10 RX ADMIN — CLOPIDOGREL BISULFATE 75 MILLIGRAM(S): 75 TABLET, FILM COATED ORAL at 11:41

## 2020-04-10 RX ADMIN — Medication 500 MILLILITER(S): at 20:13

## 2020-04-10 NOTE — CONSULT NOTE ADULT - SUBJECTIVE AND OBJECTIVE BOX
ICU Consultation Note  =====================================================  HPI:  75 y/o male with PMHx HTN, HLD, CAD s/p PCI/TEMI 10 yrs ago, CKD, who presented to ED on 3/26/20 with c/o cough, low grade temp x 1 week, and new onset SOB, also was found to be in KEEGAN on CKD.  Pt is COVID +, with worsening CXR with b/l opacities.  Pt has been on 100% NRB with adequate saturation, however the sats were getting lower, on 4/7 pt desated to 80% on 100% NRB, required emergent intubation.  During this, admission, pt also went into new onset rapid Afib, was on BB and Cardizem gtt, as well as heparin gtt started.  Pt became bradycardic, BB and Ca-blockers held for now.  Pt is now transferred to [facility] for further management.           PAST MEDICAL & SURGICAL HISTORY:  CAD (coronary artery disease)  Kidney stones  High cholesterol  HTN (hypertension)  S/P drug eluting coronary stent placement: &gt; 10 years ago  CKD    Allergies  No Known Allergies    Home Medications:  clopidogrel 75 mg oral tablet: 1 tab(s) orally once a day (30 Mar 2020 10:01)  FEBUXOSTAT  40 MG TABS:  (26 Mar 2020 01:49)  FUROSEMIDE  40 MG TABS:  (26 Mar 2020 01:49)  metoprolol tartrate 25 mg oral tablet: 1 tab(s) orally 2 times a day (30 Mar 2020 10:01)  PIOGLITAZONE HYDROCHLORIDE  30 MG TABS:  (26 Mar 2020 01:49)  ROSUVASTATIN CALCIUM  20 MG TABS:  (26 Mar 2020 01:48)    Advanced Directives: Full Code     ROS:  [ ] A ten-point review of systems was otherwise negative except as noted.  [ X] Due to altered mental status/intubation, subjective information were not able to be obtained from the patient. History was obtained, to the extent possible, from review of the chart and collateral sources of information.      CURRENT MEDICATIONS:   --------------------------------------------------------------------------------------  Neurologic Medications  acetaminophen   Tablet .. 650 milliGRAM(s) Oral every 6 hours PRN Temp greater or equal to 38C (100.4F)  midazolam Infusion 0.02 mG/kG/Hr IV Continuous <Continuous>  morphine  Infusion. 2 mG/Hr IV Continuous <Continuous>    Respiratory Medications    Cardiovascular Medications    Gastrointestinal Medications  famotidine    Tablet 20 milliGRAM(s) Oral daily  senna 2 Tablet(s) Oral at bedtime  sodium bicarbonate 325 milliGRAM(s) Oral two times a day    Genitourinary Medications    Hematologic/Oncologic Medications  clopidogrel Tablet 75 milliGRAM(s) Oral daily  heparin  Infusion 750 Unit(s)/Hr IV Continuous <Continuous>    Antimicrobial/Immunologic Medications    Endocrine/Metabolic Medications  dextrose 50% Injectable 50 milliLiter(s) IV Push every 15 minutes  insulin regular Infusion 4 Unit(s)/Hr IV Continuous <Continuous>  methylPREDNISolone sodium succinate Injectable 60 milliGRAM(s) IV Push every 12 hours    Topical/Other Medications  chlorhexidine 0.12% Liquid 15 milliLiter(s) Oral Mucosa every 12 hours    --------------------------------------------------------------------------------------    Vital Signs Last 24 Hrs  T(C): 34.9 (09 Apr 2020 23:30), Max: 36 (09 Apr 2020 18:09)  T(F): 94.9 (09 Apr 2020 23:30), Max: 96.8 (09 Apr 2020 18:09)  HR: 50 (09 Apr 2020 23:30) (48 - 64)  BP: 90/68 (09 Apr 2020 19:03) (90/68 - 143/64)  BP(mean): 78 (09 Apr 2020 17:00) (78 - 96)  RR: 25 (09 Apr 2020 23:30) (10 - 26)  SpO2: 96% (09 Apr 2020 23:30) (94% - 98%)  I&O's Detail    08 Apr 2020 07:01  -  09 Apr 2020 07:00  --------------------------------------------------------  IN:    heparin Infusion: 99 mL    heparin Infusion: 117 mL    IV PiggyBack: 100 mL    midazolam Infusion: 160 mL    morphine  Infusion.: 46 mL    Oral Fluid: 200 mL    Osmolite: 1200 mL  Total IN: 1922 mL    OUT:    Indwelling Catheter - Urethral: 1390 mL  Total OUT: 1390 mL    Total NET: 532 mL      09 Apr 2020 07:01  -  10 Apr 2020 00:45  --------------------------------------------------------  IN:    dexmedetomidine Infusion: 31.6 mL    heparin Infusion: 54 mL    heparin Infusion: 21 mL    insulin regular Infusion: 42 mL    midazolam Infusion: 80.2 mL    morphine  Infusion.: 8 mL    Osmolite: 300 mL    Peptamen A.F.: 600 mL    Sodium Chloride 0.9% IV Bolus: 500 mL  Total IN: 1636.8 mL    OUT:    Indwelling Catheter - Urethral: 980 mL  Total OUT: 980 mL    Total NET: 656.8 mL        I&O's Summary    08 Apr 2020 07:01  -  09 Apr 2020 07:00  --------------------------------------------------------  IN: 1922 mL / OUT: 1390 mL / NET: 532 mL    09 Apr 2020 07:01  -  10 Apr 2020 00:45  --------------------------------------------------------  IN: 1636.8 mL / OUT: 980 mL / NET: 656.8 mL        LABS:  ABG - ( 09 Apr 2020 00:17 )  pH, Arterial: 7.40  pH, Blood: x     /  pCO2: 46    /  pO2: 82    / HCO3: 28    / Base Excess: 3.2   /  SaO2: 96                            10.0   10.82 )-----------( 270      ( 09 Apr 2020 04:30 )             31.2     04-09    147<H>  |  112<H>  |  59<H>  ----------------------------<  157<H>  4.2   |  23  |  1.7<H>    Ca    6.6<L>      09 Apr 2020 16:52    TPro  5.3<L>  /  Alb  2.7<L>  /  TBili  0.5  /  DBili  x   /  AST  28  /  ALT  66<H>  /  AlkPhos  119<H>  04-09    LIVER FUNCTIONS - ( 09 Apr 2020 04:30 )  Alb: 2.7 g/dL / Pro: 5.3 g/dL / ALK PHOS: 119 U/L / ALT: 66 U/L / AST: 28 U/L / GGT: x           PTT - ( 09 Apr 2020 16:52 )  PTT:129.1 sec      EXAM:  General/Neuro  Exam: intubated and sedated, but arousable and follows commands, SHEPHERD, no focal deficits.    Respiratory  Exam: decreased breath sounds b/l, Normal expansion/effort.   [] Tracheostomy   [X] Intubated  Mechanical Ventilation:     Cardiovascular  Exam: S1, S2.  magalie   Cardiac Rhythm: Sinus bradycardia    GI  Exam: Abdomen soft, Non-tender, Non-distended.  Orogastric tube in place.      Extremities  Exam: Extremities warm, well-perfused.  No Peripheral edema b/l LE    Derm:  Exam: Good skin turgor, no skin breakdown.      :   Exam: Earl catheter in place.     Tubes/Lines/Drains  ***  [x] Peripheral IV  [X] Central Venous Line     	[] R	[X] L	[X] IJ	[] Fem	[] SC        Type:	    Date Placed: 4/7/20  [X] Arterial Line		[X] R	[] L	[] Fem	[] Rad	[] Ax	Date Placed: 4/7/20           [X] Urinary Catheter		Date Placed:

## 2020-04-10 NOTE — PROGRESS NOTE ADULT - SUBJECTIVE AND OBJECTIVE BOX
KEZIA MOYER  74y, Male  Allergy: No Known Allergies      CHIEF COMPLAINT: SOB (09 Apr 2020 11:10)      INTERVAL EVENTS/HPI  - No acute events overnight  - T(F): , Max: 99.2 (04-10-20 @ 06:00)  - Tolerating medication  - WBC Count: 13.25 K/uL (04-10-20 @ 04:30)      ROS  unable to obtain history secondary to patient's mental status and/or sedation     FH non-contributory   Social Hx non-contributory    VITALS:  T(F): 99.1, Max: 99.2 (04-10-20 @ 06:00)  HR: 63  BP: 90/68  RR: 23Vital Signs Last 24 Hrs  T(C): 37.3 (10 Apr 2020 08:00), Max: 37.3 (10 Apr 2020 06:00)  T(F): 99.1 (10 Apr 2020 08:00), Max: 99.2 (10 Apr 2020 06:00)  HR: 63 (10 Apr 2020 08:00) (48 - 64)  BP: 90/68 (09 Apr 2020 19:03) (90/68 - 143/64)  BP(mean): 78 (09 Apr 2020 17:00) (78 - 94)  RR: 23 (10 Apr 2020 08:00) (10 - 30)  SpO2: 99% (10 Apr 2020 08:00) (94% - 100%)    PHYSICAL EXAM:  see below       TESTS & MEASUREMENTS:                        9.9    13.25 )-----------( 269      ( 10 Apr 2020 04:30 )             31.3     04-10    145  |  108  |  67<HH>  ----------------------------<  151<H>  5.0   |  25  |  1.8<H>    Ca    7.6<L>      10 Apr 2020 00:58  Phos  3.8     04-10  Mg     3.0     04-10    TPro  5.3<L>  /  Alb  2.7<L>  /  TBili  0.5  /  DBili  x   /  AST  28  /  ALT  66<H>  /  AlkPhos  119<H>  04-09    eGFR if Non African American: 37 mL/min/1.73M2 (04-10-20 @ 00:58)  eGFR if : 42 mL/min/1.73M2 (04-10-20 @ 00:58)  eGFR if Non African American: 39 mL/min/1.73M2 (04-09-20 @ 16:52)  eGFR if : 45 mL/min/1.73M2 (04-09-20 @ 16:52)    LIVER FUNCTIONS - ( 09 Apr 2020 04:30 )  Alb: 2.7 g/dL / Pro: 5.3 g/dL / ALK PHOS: 119 U/L / ALT: 66 U/L / AST: 28 U/L / GGT: x                     INFECTIOUS DISEASES TESTING      RADIOLOGY & ADDITIONAL TESTS:  I have personally reviewed the last Chest xray  CXR      CT      CARDIOLOGY TESTING  12 Lead ECG:   Ventricular Rate 69 BPM    Atrial Rate 69 BPM    P-R Interval 146 ms    QRS Duration 86 ms    Q-T Interval 500 ms    QTC Calculation(Bezet) 535 ms    P Axis 52 degrees    R Axis 63 degrees    T Axis 69 degrees    Diagnosis Line Normal sinus rhythm  Increased R/S ratio in V1, consider early transition or posterior infarct  Prolonged QT  Abnormal ECG    Confirmed by Scooter Ortega ([id number]) on 4/8/2020 11:30:53 AM (04-08-20 @ 10:05)  12 Lead ECG:   Ventricular Rate 125 BPM    Atrial Rate 250 BPM    QRS Duration 78 ms    Q-T Interval 344 ms    QTC Calculation(Bezet) 496 ms    R Axis 48 degrees    T Axis 2 degrees    Diagnosis Line Atrial flutter with variable A-V block with premature ventricular or  aberrantly conducted complexes  Abnormal ECG    Confirmed by Scooter Ortega ([id number]) on 4/6/2020 9:40:06 PM (04-04-20 @ 05:06)      MEDICATIONS  chlorhexidine 0.12% Liquid 15  chlorhexidine 4% Liquid 1  clopidogrel Tablet 75  dextrose 50% Injectable 50  famotidine    Tablet 20  heparin  Infusion 600  insulin regular Infusion 4  methylPREDNISolone sodium succinate Injectable 60  midazolam Infusion 0.02  morphine  Infusion. 2  senna 2  sodium bicarbonate 325      ANTIBIOTICS:      All available historical data has been reviewed

## 2020-04-10 NOTE — CONSULT NOTE ADULT - ASSESSMENT
ASSESSMENT/PLAN: 74y Male with COVID viral PNA, s/p intubation on 4/7     Neurologic: sedation with Versed and Morphine gtt.   D/c'd Precedex due to bradycardia    Respiratory: acute hypoxic respiratory failure due to COVID PNA, with CXR with worsening b/l opacities  -s/p intubation 4/7/220  -/23/60/12  -f/up AM ABG and CXR   -on Solumedrol 60mg q12h    Cardiovascular: keep normotensive, MAP >65, not on pressors  -on Plavix for h/o PCI/TEMI  -holding BB and Cardizem due to bradycardia -HR 50  -on Heparin gtt for new onset Afib    Gastrointestinal/Nutrition: changed TF from bolus to continuous - Peptamen AF @ 50  -Added PPI and Senna, monitor for BM (apparently none for 5 days).  Add Lactulose if needed.    Genitourinary/Renal: KEEGAN on CKD resolving, non-oliguric (peak Cr 4.2), Cr 1.7 now (back to baseline)  -keep sosa for strict I and Os, replete electrolytes prn  -hypernatremia Na 147, trend, add free water if still elevated    Hematologic: Hb 10/31, on Heparin gtt @ 750u/hr, keep PTT therapeutic, f/up 2330 labs    Infectious Disease: COVID-19 PNA - completed course of Plaquenil/Azithromycin and Anakinra x 3days  -procalcitonin 0.14, will follow levels  -WBC 10.8 from 16, hypothermic T 94.5 --> bear hugger placed    Endocrine: hyperglycemia, HbA1C on 4/6 - 6.8  -on Insulin gtt, FS q1h    Disposition: ICU level of care  -will d/w Dr. Rizvi ASSESSMENT/PLAN: 74y Male with COVID viral PNA, s/p intubation on 4/7     Neurologic: sedation with Versed and Morphine gtt.   D/c'd Precedex due to bradycardia    Respiratory: acute hypoxic respiratory failure due to COVID PNA, with CXR with worsening b/l opacities  -s/p intubation 4/7/220  -/23/40/12  -f/up AM ABG and CXR   -on Solumedrol 60mg q12h    Cardiovascular: keep normotensive, MAP >65, not on pressors  -on Plavix for h/o PCI/TEMI  -holding BB and Cardizem due to bradycardia -HR 50  -on Heparin gtt for new onset Afib    Gastrointestinal/Nutrition: changed TF from bolus to continuous - Peptamen AF @ 50  -Added PPI and Senna, monitor for BM (apparently none for 5 days).  Add Lactulose if needed.    Genitourinary/Renal: KEEGAN on CKD resolving, non-oliguric (peak Cr 4.2), Cr 1.7 now (back to baseline)  -keep sosa for strict I and Os, replete electrolytes prn  -hypernatremia Na 147, trend, add free water if still elevated    Hematologic: Hb 10/31, on Heparin gtt @ 750u/hr, keep PTT therapeutic, f/up 2330 labs    Infectious Disease: COVID-19 PNA - completed course of Plaquenil/Azithromycin and Anakinra x 3days  -procalcitonin 0.14, will follow levels  -WBC 10.8 from 16, hypothermic T 94.5 --> bear hugger placed    Endocrine: hyperglycemia, HbA1C on 4/6 - 6.8  -on Insulin gtt, FS q1h    Disposition: ICU level of care  -will d/w Dr. Rizvi

## 2020-04-10 NOTE — CHART NOTE - NSCHARTNOTEFT_GEN_A_CORE
This patient is a close family friend of Dr. Davila.  When he was in F9, we were calling 2-3 times a day with updates.  Please continue to update him.  Due to passover Thursday-Saturday, I have been leaving voicemails at .  Please call and leave a message with updates for him today and tomorrow.    His kids numbers are the following:  Mari   Beltran   Adan   If extra time (or if there is a communication team), he was also asking if brief updates could be sent to his children daily.

## 2020-04-10 NOTE — PROGRESS NOTE ADULT - ASSESSMENT
74y old Male who presents with a chief complaint of cough x 1 week  Currently admitted to medicine with the primary diagnosis of Acute hypoxic respiratory failure secondary to SARS COv2 pneumonia    IMPRESSION:  Sepsis with SIRS due to COVID 19 PNA, intubated 4/7    CXR with diffuse bilateral opacities , white out-- now decreased on L     Procalcitonin, Serum: 0.14 (04-08-20 @ 04:30)  #Cytokine Release Syndrome   C-Reactive Protein, Serum: 1.46 mg/dL (04-08-20 @ 04:30)  - s/p azithro/plaqeunil/ Anakinra 100mg SC q6h x 3 days   On methylPREDNISolone sodium succinate Injectable 60  heparin  Infusion 600    #hyponatremia    - T(F): , Max: 99.2   - WBC Count: 13.25 K/uL     RECOMMENDATIONS:  Observe off antibiotics   - If fever, repeat procalcitonin

## 2020-04-10 NOTE — CHART NOTE - NSCHARTNOTEFT_GEN_A_CORE
Attended rounds  Called Dr. Davila PCP [phone] - discussed in great detail  Called Matthew Orellana [phone] - discussed in great detail  Called Son Adan [phone] - left a voice mail ( he prefers to leave a voice mail as he cant  the phone during passover)    Updated on the ventilator requirement - CXR shows mild improvemetn  Updated on reducing sedation from morphine/versed to just versed  Updated on insulin drip.   Updated on changing heparin to argatroban drip. Attended rounds  Called Dr. Davila PCP [phone] - discussed in great detail  Called Matthew Orellana [phone] - discussed in great detail  Called Matthew Arellano [phone] - left a voice mail ( he prefers to leave a voice mail as he cant  the phone during passover)    Updated on the ventilator requirement - CXR shows mild improvemetn  Updated on reducing sedation from morphine/versed to just versed  Updated on insulin drip.   Updated on changing heparin to argatroban drip.

## 2020-04-10 NOTE — CONSULT NOTE ADULT - ATTENDING COMMENTS
pt seen  above note reviewed  discussed w/ fellow  CKD   now w/ fever /SOB likel [id number]  KEEGAN on cKD  improving w/ fluids   trend CR   workup as above
I personally provided over 30 minutes of direct critical care to this patient.  I examined the patient with the PA and resident and discussed my plan with them.    versed morphine for sedation, will wean to propofol   7.4/47/76/29/95  400/23/40/12  discontinuing solumedrol  continuing plavix  heparin gtt for afib, theraputic  acute on chronic renal failure, improved to 1.7  wbc trending down then up to 13 today  insulin gtt   ddimer >3000

## 2020-04-11 LAB
4/8 RATIO: 2.41 RATIO — SIGNIFICANT CHANGE UP (ref 0.86–4.14)
ABS CD8: 97 /UL — SIGNIFICANT CHANGE UP (ref 90–775)
ALBUMIN SERPL ELPH-MCNC: 2.5 G/DL — LOW (ref 3.5–5.2)
ALBUMIN SERPL ELPH-MCNC: 2.6 G/DL — LOW (ref 3.5–5.2)
ALP SERPL-CCNC: 102 U/L — SIGNIFICANT CHANGE UP (ref 30–115)
ALP SERPL-CCNC: 116 U/L — HIGH (ref 30–115)
ALT FLD-CCNC: 46 U/L — HIGH (ref 0–41)
ALT FLD-CCNC: 52 U/L — HIGH (ref 0–41)
ANION GAP SERPL CALC-SCNC: 11 MMOL/L — SIGNIFICANT CHANGE UP (ref 7–14)
ANION GAP SERPL CALC-SCNC: 12 MMOL/L — SIGNIFICANT CHANGE UP (ref 7–14)
APTT BLD: 68.7 SEC — HIGH (ref 27–39.2)
APTT BLD: 70 SEC — HIGH (ref 27–39.2)
APTT BLD: 83.4 SEC — CRITICAL HIGH (ref 27–39.2)
AST SERPL-CCNC: 23 U/L — SIGNIFICANT CHANGE UP (ref 0–41)
AST SERPL-CCNC: 28 U/L — SIGNIFICANT CHANGE UP (ref 0–41)
BASOPHILS # BLD AUTO: 0.02 K/UL — SIGNIFICANT CHANGE UP (ref 0–0.2)
BASOPHILS NFR BLD AUTO: 0.1 % — SIGNIFICANT CHANGE UP (ref 0–1)
BILIRUB SERPL-MCNC: 0.4 MG/DL — SIGNIFICANT CHANGE UP (ref 0.2–1.2)
BILIRUB SERPL-MCNC: 0.6 MG/DL — SIGNIFICANT CHANGE UP (ref 0.2–1.2)
BUN SERPL-MCNC: 74 MG/DL — CRITICAL HIGH (ref 10–20)
BUN SERPL-MCNC: 80 MG/DL — CRITICAL HIGH (ref 10–20)
CA-I BLD-SCNC: 1.22 MMOL/L — SIGNIFICANT CHANGE UP (ref 1.12–1.3)
CALCIUM SERPL-MCNC: 7.7 MG/DL — LOW (ref 8.5–10.1)
CALCIUM SERPL-MCNC: 7.9 MG/DL — LOW (ref 8.5–10.1)
CD16+CD56+ CELLS NFR BLD: 13 % — SIGNIFICANT CHANGE UP (ref 7–27)
CD16+CD56+ CELLS NFR SPEC: 138 /UL — SIGNIFICANT CHANGE UP (ref 80–426)
CD19 BLASTS SPEC-ACNC: 55 % — HIGH (ref 4–18)
CD19 BLASTS SPEC-ACNC: 571 /UL — HIGH (ref 32–326)
CD3 BLASTS SPEC-ACNC: 31 % — LOW (ref 58–84)
CD3 BLASTS SPEC-ACNC: 324 /UL — LOW (ref 396–2024)
CD4 %: 22 % — LOW (ref 30–56)
CD8 %: 9 % — LOW (ref 11–43)
CHLORIDE SERPL-SCNC: 107 MMOL/L — SIGNIFICANT CHANGE UP (ref 98–110)
CHLORIDE SERPL-SCNC: 108 MMOL/L — SIGNIFICANT CHANGE UP (ref 98–110)
CK SERPL-CCNC: 47 U/L — SIGNIFICANT CHANGE UP (ref 0–225)
CO2 SERPL-SCNC: 24 MMOL/L — SIGNIFICANT CHANGE UP (ref 17–32)
CO2 SERPL-SCNC: 26 MMOL/L — SIGNIFICANT CHANGE UP (ref 17–32)
CREAT SERPL-MCNC: 1.8 MG/DL — HIGH (ref 0.7–1.5)
CREAT SERPL-MCNC: 2.1 MG/DL — HIGH (ref 0.7–1.5)
CRP SERPL-MCNC: 0.88 MG/DL — HIGH (ref 0–0.4)
D DIMER BLD IA.RAPID-MCNC: 2940 NG/ML DDU — HIGH (ref 0–230)
EOSINOPHIL # BLD AUTO: 0 K/UL — SIGNIFICANT CHANGE UP (ref 0–0.7)
EOSINOPHIL NFR BLD AUTO: 0 % — SIGNIFICANT CHANGE UP (ref 0–8)
ERYTHROCYTE [SEDIMENTATION RATE] IN BLOOD: 61 MM/HR — HIGH (ref 0–10)
FERRITIN SERPL-MCNC: 977 NG/ML — HIGH (ref 30–400)
GAS PNL BLDA: SIGNIFICANT CHANGE UP
GAS PNL BLDA: SIGNIFICANT CHANGE UP
GLUCOSE BLDC GLUCOMTR-MCNC: 128 MG/DL — HIGH (ref 70–99)
GLUCOSE BLDC GLUCOMTR-MCNC: 142 MG/DL — HIGH (ref 70–99)
GLUCOSE BLDC GLUCOMTR-MCNC: 150 MG/DL — HIGH (ref 70–99)
GLUCOSE BLDC GLUCOMTR-MCNC: 163 MG/DL — HIGH (ref 70–99)
GLUCOSE BLDC GLUCOMTR-MCNC: 188 MG/DL — HIGH (ref 70–99)
GLUCOSE BLDC GLUCOMTR-MCNC: 213 MG/DL — HIGH (ref 70–99)
GLUCOSE BLDC GLUCOMTR-MCNC: 213 MG/DL — HIGH (ref 70–99)
GLUCOSE BLDC GLUCOMTR-MCNC: 214 MG/DL — HIGH (ref 70–99)
GLUCOSE BLDC GLUCOMTR-MCNC: 233 MG/DL — HIGH (ref 70–99)
GLUCOSE BLDC GLUCOMTR-MCNC: 264 MG/DL — HIGH (ref 70–99)
GLUCOSE BLDC GLUCOMTR-MCNC: 91 MG/DL — SIGNIFICANT CHANGE UP (ref 70–99)
GLUCOSE BLDC GLUCOMTR-MCNC: 99 MG/DL — SIGNIFICANT CHANGE UP (ref 70–99)
GLUCOSE SERPL-MCNC: 173 MG/DL — HIGH (ref 70–99)
GLUCOSE SERPL-MCNC: 233 MG/DL — HIGH (ref 70–99)
HCT VFR BLD CALC: 30.9 % — LOW (ref 42–52)
HGB BLD-MCNC: 9.6 G/DL — LOW (ref 14–18)
IMM GRANULOCYTES NFR BLD AUTO: 2.6 % — HIGH (ref 0.1–0.3)
INR BLD: 1.04 RATIO — SIGNIFICANT CHANGE UP (ref 0.65–1.3)
INR BLD: 1.05 RATIO — SIGNIFICANT CHANGE UP (ref 0.65–1.3)
LACTATE SERPL-SCNC: 1.9 MMOL/L — SIGNIFICANT CHANGE UP (ref 0.7–2)
LDH SERPL L TO P-CCNC: 504 U/L — HIGH (ref 50–242)
LYMPHOCYTES # BLD AUTO: 1.06 K/UL — LOW (ref 1.2–3.4)
LYMPHOCYTES # BLD AUTO: 6.8 % — LOW (ref 20.5–51.1)
MAGNESIUM SERPL-MCNC: 3.1 MG/DL — CRITICAL HIGH (ref 1.8–2.4)
MAGNESIUM SERPL-MCNC: 3.2 MG/DL — CRITICAL HIGH (ref 1.8–2.4)
MCHC RBC-ENTMCNC: 29.1 PG — SIGNIFICANT CHANGE UP (ref 27–31)
MCHC RBC-ENTMCNC: 31.1 G/DL — LOW (ref 32–37)
MCV RBC AUTO: 93.6 FL — SIGNIFICANT CHANGE UP (ref 80–94)
MONOCYTES # BLD AUTO: 1.2 K/UL — HIGH (ref 0.1–0.6)
MONOCYTES NFR BLD AUTO: 7.7 % — SIGNIFICANT CHANGE UP (ref 1.7–9.3)
NEUTROPHILS # BLD AUTO: 12.99 K/UL — HIGH (ref 1.4–6.5)
NEUTROPHILS NFR BLD AUTO: 82.8 % — HIGH (ref 42.2–75.2)
NRBC # BLD: 0 /100 WBCS — SIGNIFICANT CHANGE UP (ref 0–0)
PHOSPHATE SERPL-MCNC: 3.4 MG/DL — SIGNIFICANT CHANGE UP (ref 2.1–4.9)
PHOSPHATE SERPL-MCNC: 3.8 MG/DL — SIGNIFICANT CHANGE UP (ref 2.1–4.9)
PLATELET # BLD AUTO: 259 K/UL — SIGNIFICANT CHANGE UP (ref 130–400)
POTASSIUM SERPL-MCNC: 4.9 MMOL/L — SIGNIFICANT CHANGE UP (ref 3.5–5)
POTASSIUM SERPL-MCNC: 5.5 MMOL/L — HIGH (ref 3.5–5)
POTASSIUM SERPL-SCNC: 4.9 MMOL/L — SIGNIFICANT CHANGE UP (ref 3.5–5)
POTASSIUM SERPL-SCNC: 5.5 MMOL/L — HIGH (ref 3.5–5)
PROCALCITONIN SERPL-MCNC: 0.18 NG/ML — HIGH (ref 0.02–0.1)
PROT SERPL-MCNC: 5.1 G/DL — LOW (ref 6–8)
PROT SERPL-MCNC: 5.2 G/DL — LOW (ref 6–8)
PROTHROM AB SERPL-ACNC: 12 SEC — SIGNIFICANT CHANGE UP (ref 9.95–12.87)
PROTHROM AB SERPL-ACNC: 12.1 SEC — SIGNIFICANT CHANGE UP (ref 9.95–12.87)
RBC # BLD: 3.3 M/UL — LOW (ref 4.7–6.1)
RBC # FLD: 14.4 % — SIGNIFICANT CHANGE UP (ref 11.5–14.5)
SODIUM SERPL-SCNC: 144 MMOL/L — SIGNIFICANT CHANGE UP (ref 135–146)
SODIUM SERPL-SCNC: 144 MMOL/L — SIGNIFICANT CHANGE UP (ref 135–146)
T-CELL CD4 SUBSET PNL BLD: 234 /UL — LOW (ref 325–1251)
TRIGL SERPL-MCNC: 102 MG/DL — SIGNIFICANT CHANGE UP (ref 10–149)
TROPONIN T SERPL-MCNC: 0.04 NG/ML — CRITICAL HIGH
WBC # BLD: 15.68 K/UL — HIGH (ref 4.8–10.8)
WBC # FLD AUTO: 15.68 K/UL — HIGH (ref 4.8–10.8)

## 2020-04-11 PROCEDURE — 71045 X-RAY EXAM CHEST 1 VIEW: CPT | Mod: 26

## 2020-04-11 PROCEDURE — 36556 INSERT NON-TUNNEL CV CATH: CPT

## 2020-04-11 PROCEDURE — 99291 CRITICAL CARE FIRST HOUR: CPT | Mod: 25,CS

## 2020-04-11 RX ORDER — DEXMEDETOMIDINE HYDROCHLORIDE IN 0.9% SODIUM CHLORIDE 4 UG/ML
0.01 INJECTION INTRAVENOUS
Qty: 400 | Refills: 0 | Status: DISCONTINUED | OUTPATIENT
Start: 2020-04-11 | End: 2020-04-11

## 2020-04-11 RX ORDER — PROPOFOL 10 MG/ML
3 INJECTION, EMULSION INTRAVENOUS ONCE
Refills: 0 | Status: COMPLETED | OUTPATIENT
Start: 2020-04-11 | End: 2020-04-11

## 2020-04-11 RX ORDER — DEXMEDETOMIDINE HYDROCHLORIDE IN 0.9% SODIUM CHLORIDE 4 UG/ML
0.01 INJECTION INTRAVENOUS
Qty: 200 | Refills: 0 | Status: DISCONTINUED | OUTPATIENT
Start: 2020-04-11 | End: 2020-04-12

## 2020-04-11 RX ADMIN — Medication 325 MILLIGRAM(S): at 16:15

## 2020-04-11 RX ADMIN — CHLORHEXIDINE GLUCONATE 15 MILLILITER(S): 213 SOLUTION TOPICAL at 05:14

## 2020-04-11 RX ADMIN — LACTULOSE 10 GRAM(S): 10 SOLUTION ORAL at 05:14

## 2020-04-11 RX ADMIN — INSULIN HUMAN 4 UNIT(S)/HR: 100 INJECTION, SOLUTION SUBCUTANEOUS at 06:09

## 2020-04-11 RX ADMIN — Medication 2 MG/HR: at 20:27

## 2020-04-11 RX ADMIN — LACTULOSE 10 GRAM(S): 10 SOLUTION ORAL at 16:09

## 2020-04-11 RX ADMIN — DEXMEDETOMIDINE HYDROCHLORIDE IN 0.9% SODIUM CHLORIDE 0.24 MICROGRAM(S)/KG/HR: 4 INJECTION INTRAVENOUS at 11:50

## 2020-04-11 RX ADMIN — CHLORHEXIDINE GLUCONATE 15 MILLILITER(S): 213 SOLUTION TOPICAL at 16:16

## 2020-04-11 RX ADMIN — Medication 325 MILLIGRAM(S): at 05:14

## 2020-04-11 RX ADMIN — PROPOFOL 3 MILLIGRAM(S): 10 INJECTION, EMULSION INTRAVENOUS at 06:08

## 2020-04-11 RX ADMIN — CHLORHEXIDINE GLUCONATE 1 APPLICATION(S): 213 SOLUTION TOPICAL at 05:15

## 2020-04-11 RX ADMIN — LACTULOSE 10 GRAM(S): 10 SOLUTION ORAL at 22:15

## 2020-04-11 RX ADMIN — DEXMEDETOMIDINE HYDROCHLORIDE IN 0.9% SODIUM CHLORIDE 0.24 MICROGRAM(S)/KG/HR: 4 INJECTION INTRAVENOUS at 16:12

## 2020-04-11 RX ADMIN — SENNA PLUS 2 TABLET(S): 8.6 TABLET ORAL at 22:15

## 2020-04-11 RX ADMIN — CLOPIDOGREL BISULFATE 75 MILLIGRAM(S): 75 TABLET, FILM COATED ORAL at 16:09

## 2020-04-11 RX ADMIN — FAMOTIDINE 20 MILLIGRAM(S): 10 INJECTION INTRAVENOUS at 16:09

## 2020-04-11 NOTE — PROGRESS NOTE ADULT - ASSESSMENT
Neurologic:   sedation with Versed - weaning down     Respiratory:   acute hypoxic respiratory failure due to COVID PNA s/p intubation 4/7  -/23/40/12  AM ABG 7.43/41/110/27   AM CXR    Cardiovascular:   Acute HTN- on cleviprex, keep normotensive, MAP >65  -on Plavix for h/o PCI/TEMI  -holding BB and Cardizem due to bradycardia -HR 50s  -on Heparin gtt for new onset Afib - at 550/hr PTT 83.4. F/U 6 AM   -Troponin 0.04, stable     Gastrointestinal/Nutrition:  NPO with TF- Peptamen AF @ 50. Held TF, no residual. Placed OGT to suction 2/2 to abdominal distenstion. Restarted TF.   PPI and Senna, lactulose, monitor for BM (apparently none for 5 days)  Triglycerides 102     Genitourinary/Renal:   KEEGAN on CKD resolving, non-oliguric (peak Cr 4.2)  BUN/Cr 80/2.1   UOP drpped to 5cc during the day, gave 250cc bolus- responded. UOP 40cc/hr   keep sosa for strict I and Os  Na 144/K 4.9/ Mg 3.2/ Phos 3.8     Hematologic:   Hb 10/31, on Heparin gtt @ 550u/hr   PTT 83.4, F/U 6 AM PTT   Hgb 9.6, stable     Infectious Disease:   COVID-19 PNA - completed course of Plaquenil/Azithromycin and Anakinra x 3days  -procalcitonin 0.14, will follow levels  -WBC 16>10.8> 15.6   -Afebrile     Endocrine:   Hyperglycemia- on insulin drip   HbA1C - 6.8 Neurologic:   sedation with Versed - weaning down     Respiratory:   acute hypoxic respiratory failure due to COVID PNA s/p intubation 4/7  -/23/40/12  AM ABG 7.43/41/110/27   AM CXR    Cardiovascular:   Acute HTN- on cleviprex, keep normotensive, MAP >65  -on Plavix for h/o PCI/TEMI  -holding BB and Cardizem due to bradycardia -HR 50s  -on Heparin gtt for new onset Afib - at 550/hr PTT 83.4 > 70  -Troponin 0.04, stable     Gastrointestinal/Nutrition:  NPO with TF- Peptamen AF @ 50. Held TF, no residual. Placed OGT to suction 2/2 to abdominal distenstion. Restarted TF.   PPI and Senna, lactulose, monitor for BM (apparently none for 5 days)  Triglycerides 102     Genitourinary/Renal:   KEEGAN on CKD resolving, non-oliguric (peak Cr 4.2)  BUN/Cr 80/2.1   UOP drpped to 5cc during the day, gave 250cc bolus- responded. UOP 40cc/hr   keep sosa for strict I and Os  Na 144/K 4.9/ Mg 3.2/ Phos 3.8   lactate 1.9  > 1.4    Hematologic:   Hb 10/31, on Heparin gtt @ 550u/hr   PTT 83.4 > 70  Hgb 9.6, stable     Infectious Disease:   COVID-19 PNA - completed course of Plaquenil/Azithromycin and Anakinra x 3days  -procalcitonin 0.14, will follow levels  -WBC 16>10.8> 15.6   -Afebrile   -CRP 4 >3 > 1, ESR 61    Endocrine:   Hyperglycemia- on insulin drip   HbA1C - 6.8

## 2020-04-11 NOTE — PROCEDURE NOTE - NSINDICATIONS_GEN_A_CORE
venous access/critical illness/hypertonic/irritant infusion/volume resuscitation
monitoring purposes/arterial puncture to obtain ABG's/blood sampling

## 2020-04-11 NOTE — PROCEDURE NOTE - NSPROCDETAILS_GEN_ALL_CORE
guidewire recovered/sterile dressing applied/sterile technique, catheter placed/ultrasound guidance/lumen(s) aspirated and flushed
location identified, draped/prepped, sterile technique used, needle inserted/introduced/ultrasound guidance/sutured in place/hemostasis with direct pressure, dressing applied/Seldinger technique/connected to a pressurized flush line

## 2020-04-11 NOTE — PROGRESS NOTE ADULT - SUBJECTIVE AND OBJECTIVE BOX
KEZIA MOYER  [id number]  74y Male    Indication for ICU admission: COVID PNA, intubated on 4/7, transferred from F9 to burn ICU for further treatment    Admit Date:03-26-20  ICU Date: 4/7      No Known Allergies    PAST MEDICAL & SURGICAL HISTORY:  CAD (coronary artery disease)  Kidney stones  High cholesterol  HTN (hypertension)  S/P drug eluting coronary stent placement: &gt; 10 years ago  CKD    Home Medications:  clopidogrel 75 mg oral tablet: 1 tab(s) orally once a day (30 Mar 2020 10:01)  FEBUXOSTAT  40 MG TABS:  (26 Mar 2020 01:49)  FUROSEMIDE  40 MG TABS:  (26 Mar 2020 01:49)  metoprolol tartrate 25 mg oral tablet: 1 tab(s) orally 2 times a day (30 Mar 2020 10:01)  PIOGLITAZONE HYDROCHLORIDE  30 MG TABS:  (26 Mar 2020 01:49)  ROSUVASTATIN CALCIUM  20 MG TABS:  (26 Mar 2020 01:48)      24HRS EVENT:    Overnight: No acute events. Afebrile. UOP 40-50cc/hr. No repletetions overnight. Heparin gtt at 550, PTT 83.4, stable. Next PTT 6 AM.     Neurologic:   sedation with Versed - weaning down     Respiratory:   acute hypoxic respiratory failure due to COVID PNA s/p intubation 4/7  -/23/40/12  AM ABG 7.43/41/110/27   AM CXR    Cardiovascular:   Acute HTN- on cleviprex, keep normotensive, MAP >65  -on Plavix for h/o PCI/TEMI  -holding BB and Cardizem due to bradycardia -HR 50s  -on Heparin gtt for new onset Afib - at 550/hr PTT 83.4. F/U 6 AM   -Troponin 0.04, stable     Gastrointestinal/Nutrition:  NPO with TF- Peptamen AF @ 50. Held TF, no residual. Placed OGT to suction 2/2 to abdominal distenstion. Restarted TF.   PPI and Senna, lactulose, monitor for BM (apparently none for 5 days)  Triglycerides 102     Genitourinary/Renal:   KEEGAN on CKD resolving, non-oliguric (peak Cr 4.2)  BUN/Cr 80/2.1   UOP drpped to 5cc during the day, gave 250cc bolus- responded. UOP 40cc/hr   keep sosa for strict I and Os  Na 144/K 4.9/ Mg 3.2/ Phos 3.8     Hematologic:   Hb 10/31, on Heparin gtt @ 550u/hr   PTT 83.4, F/U 6 AM PTT   Hgb 9.6, stable     Infectious Disease:   COVID-19 PNA - completed course of Plaquenil/Azithromycin and Anakinra x 3days  -procalcitonin 0.14, will follow levels  -WBC 16>10.8> 15.6   -Afebrile     Endocrine:   Hyperglycemia- on insulin drip   HbA1C - 6.8    DVT PTX: Heparin gtt  GI PTX: Pepcid    ***Tubes/Lines/Drains  ***  Peripheral IV  Central Venous Line   left IJ  	Date 4/7  Arterial Line    right radial		                Date 4/7    Urinary Catheter		Indication: Strict I&O    Date Placed:     REVIEW OF SYSTEMS    [ ] A ten-point review of systems was otherwise negative except as noted.  [X ] Due to altered mental status/intubation, subjective information were not able to be obtained from the patient. History was obtained, to the extent possible, from review of the chart and collateral sources of information.

## 2020-04-11 NOTE — CHART NOTE - NSCHARTNOTEFT_GEN_A_CORE
Rounded with Burn Unit Team  Proceeded to call the following individuals:  Called Dr. Davila PCP [phone] - discussed in great detail  Called Matthew Orellana [phone] - discussed in great detail  Called Matthew Arellano [phone] - left a voice mail ( he prefers to leave a voice mail as he cant  the phone during passover)    Discussed:  - mild respiratory improvement  - PEEP 12 --> 11   - Heparin --> Argatroban??    Questions answered. Rounded with Burn Unit Team  Proceeded to call the following individuals:  Called Dr. Davila PCP [phone] - discussed in great detail  Called Matthew Orellana [phone] - discussed in great detail  Called Matthew Arellano [phone] - left a voice mail ( he prefers to leave a voice mail as he cant  the phone during passover)    Discussed:  - mild respiratory improvement  - PEEP 12 --> 11   - Heparin --> Argatroban?? will discuss with team    Questions answered Rounded with Burn Unit Team  Proceeded to call the following individuals:  Called Dr. Davila PCP [phone] - discussed in great detail  Called Matthew Orellana [phone] - discussed in great detail  Called Matthew Arellano [phone] - left a voice mail ( he prefers to leave a voice mail as he cant  the phone during passover)    Discussed:  - mild respiratory improvement  - PEEP 12 --> 11   - Heparin --> Argatroban?? will discuss with team    Questions answered        Discussed with Dr. Rizvi.  Given that the patient was stable on heparin, there was no reason to change to Argatroban.  Patient stable.

## 2020-04-12 LAB
ALBUMIN SERPL ELPH-MCNC: 2.6 G/DL — LOW (ref 3.5–5.2)
ALP SERPL-CCNC: 102 U/L — SIGNIFICANT CHANGE UP (ref 30–115)
ALT FLD-CCNC: 42 U/L — HIGH (ref 0–41)
ANION GAP SERPL CALC-SCNC: 10 MMOL/L — SIGNIFICANT CHANGE UP (ref 7–14)
APTT BLD: 48.7 SEC — HIGH (ref 27–39.2)
APTT BLD: 56.3 SEC — HIGH (ref 27–39.2)
APTT BLD: 56.5 SEC — HIGH (ref 27–39.2)
AST SERPL-CCNC: 22 U/L — SIGNIFICANT CHANGE UP (ref 0–41)
BASE EXCESS BLDA CALC-SCNC: 7.5 MMOL/L — HIGH (ref -2–2)
BASOPHILS # BLD AUTO: 0.02 K/UL — SIGNIFICANT CHANGE UP (ref 0–0.2)
BASOPHILS # BLD AUTO: 0.02 K/UL — SIGNIFICANT CHANGE UP (ref 0–0.2)
BASOPHILS NFR BLD AUTO: 0.2 % — SIGNIFICANT CHANGE UP (ref 0–1)
BASOPHILS NFR BLD AUTO: 0.2 % — SIGNIFICANT CHANGE UP (ref 0–1)
BILIRUB SERPL-MCNC: 0.5 MG/DL — SIGNIFICANT CHANGE UP (ref 0.2–1.2)
BUN SERPL-MCNC: 68 MG/DL — CRITICAL HIGH (ref 10–20)
CALCIUM SERPL-MCNC: 7.7 MG/DL — LOW (ref 8.5–10.1)
CHLORIDE SERPL-SCNC: 110 MMOL/L — SIGNIFICANT CHANGE UP (ref 98–110)
CO2 SERPL-SCNC: 27 MMOL/L — SIGNIFICANT CHANGE UP (ref 17–32)
CREAT SERPL-MCNC: 1.8 MG/DL — HIGH (ref 0.7–1.5)
EOSINOPHIL # BLD AUTO: 0.1 K/UL — SIGNIFICANT CHANGE UP (ref 0–0.7)
EOSINOPHIL # BLD AUTO: 0.12 K/UL — SIGNIFICANT CHANGE UP (ref 0–0.7)
EOSINOPHIL NFR BLD AUTO: 0.8 % — SIGNIFICANT CHANGE UP (ref 0–8)
EOSINOPHIL NFR BLD AUTO: 1.1 % — SIGNIFICANT CHANGE UP (ref 0–8)
GAS PNL BLDA: SIGNIFICANT CHANGE UP
GAS PNL BLDA: SIGNIFICANT CHANGE UP
GLUCOSE BLDC GLUCOMTR-MCNC: 117 MG/DL — HIGH (ref 70–99)
GLUCOSE BLDC GLUCOMTR-MCNC: 118 MG/DL — HIGH (ref 70–99)
GLUCOSE BLDC GLUCOMTR-MCNC: 120 MG/DL — HIGH (ref 70–99)
GLUCOSE BLDC GLUCOMTR-MCNC: 122 MG/DL — HIGH (ref 70–99)
GLUCOSE BLDC GLUCOMTR-MCNC: 137 MG/DL — HIGH (ref 70–99)
GLUCOSE BLDC GLUCOMTR-MCNC: 147 MG/DL — HIGH (ref 70–99)
GLUCOSE BLDC GLUCOMTR-MCNC: 153 MG/DL — HIGH (ref 70–99)
GLUCOSE BLDC GLUCOMTR-MCNC: 153 MG/DL — HIGH (ref 70–99)
GLUCOSE BLDC GLUCOMTR-MCNC: 165 MG/DL — HIGH (ref 70–99)
GLUCOSE BLDC GLUCOMTR-MCNC: 190 MG/DL — HIGH (ref 70–99)
GLUCOSE BLDC GLUCOMTR-MCNC: 195 MG/DL — HIGH (ref 70–99)
GLUCOSE BLDC GLUCOMTR-MCNC: 224 MG/DL — HIGH (ref 70–99)
GLUCOSE SERPL-MCNC: 112 MG/DL — HIGH (ref 70–99)
HCO3 BLDA-SCNC: 32 MMOL/L — HIGH (ref 21–29)
HCT VFR BLD CALC: 31.4 % — LOW (ref 42–52)
HCT VFR BLD CALC: 34 % — LOW (ref 42–52)
HCT VFR BLD CALC: 36.6 % — LOW (ref 42–52)
HGB BLD-MCNC: 11 G/DL — LOW (ref 14–18)
HGB BLD-MCNC: 11.2 G/DL — LOW (ref 14–18)
HGB BLD-MCNC: 9.9 G/DL — LOW (ref 14–18)
HOROWITZ INDEX BLDA+IHG-RTO: 21 — SIGNIFICANT CHANGE UP
IMM GRANULOCYTES NFR BLD AUTO: 4.1 % — HIGH (ref 0.1–0.3)
IMM GRANULOCYTES NFR BLD AUTO: 5.2 % — HIGH (ref 0.1–0.3)
INR BLD: 1 RATIO — SIGNIFICANT CHANGE UP (ref 0.65–1.3)
LYMPHOCYTES # BLD AUTO: 1.28 K/UL — SIGNIFICANT CHANGE UP (ref 1.2–3.4)
LYMPHOCYTES # BLD AUTO: 1.51 K/UL — SIGNIFICANT CHANGE UP (ref 1.2–3.4)
LYMPHOCYTES # BLD AUTO: 10.3 % — LOW (ref 20.5–51.1)
LYMPHOCYTES # BLD AUTO: 13.7 % — LOW (ref 20.5–51.1)
MAGNESIUM SERPL-MCNC: 2.9 MG/DL — HIGH (ref 1.8–2.4)
MCHC RBC-ENTMCNC: 28.4 PG — SIGNIFICANT CHANGE UP (ref 27–31)
MCHC RBC-ENTMCNC: 29.6 PG — SIGNIFICANT CHANGE UP (ref 27–31)
MCHC RBC-ENTMCNC: 30.3 PG — SIGNIFICANT CHANGE UP (ref 27–31)
MCHC RBC-ENTMCNC: 30.6 G/DL — LOW (ref 32–37)
MCHC RBC-ENTMCNC: 31.5 G/DL — LOW (ref 32–37)
MCHC RBC-ENTMCNC: 32.4 G/DL — SIGNIFICANT CHANGE UP (ref 32–37)
MCV RBC AUTO: 92.7 FL — SIGNIFICANT CHANGE UP (ref 80–94)
MCV RBC AUTO: 93.7 FL — SIGNIFICANT CHANGE UP (ref 80–94)
MCV RBC AUTO: 93.7 FL — SIGNIFICANT CHANGE UP (ref 80–94)
MONOCYTES # BLD AUTO: 0.99 K/UL — HIGH (ref 0.1–0.6)
MONOCYTES # BLD AUTO: 1.1 K/UL — HIGH (ref 0.1–0.6)
MONOCYTES NFR BLD AUTO: 7.9 % — SIGNIFICANT CHANGE UP (ref 1.7–9.3)
MONOCYTES NFR BLD AUTO: 9.9 % — HIGH (ref 1.7–9.3)
NEUTROPHILS # BLD AUTO: 7.74 K/UL — HIGH (ref 1.4–6.5)
NEUTROPHILS # BLD AUTO: 9.56 K/UL — HIGH (ref 1.4–6.5)
NEUTROPHILS NFR BLD AUTO: 69.9 % — SIGNIFICANT CHANGE UP (ref 42.2–75.2)
NEUTROPHILS NFR BLD AUTO: 76.7 % — HIGH (ref 42.2–75.2)
NRBC # BLD: 0 /100 WBCS — SIGNIFICANT CHANGE UP (ref 0–0)
PCO2 BLDA: 41 MMHG — SIGNIFICANT CHANGE UP (ref 38–42)
PH BLDA: 7.49 — HIGH (ref 7.38–7.42)
PHOSPHATE SERPL-MCNC: 3.3 MG/DL — SIGNIFICANT CHANGE UP (ref 2.1–4.9)
PLATELET # BLD AUTO: 242 K/UL — SIGNIFICANT CHANGE UP (ref 130–400)
PLATELET # BLD AUTO: 245 K/UL — SIGNIFICANT CHANGE UP (ref 130–400)
PLATELET # BLD AUTO: 251 K/UL — SIGNIFICANT CHANGE UP (ref 130–400)
PO2 BLDA: 151 MMHG — HIGH (ref 78–95)
POTASSIUM SERPL-MCNC: 5 MMOL/L — SIGNIFICANT CHANGE UP (ref 3.5–5)
POTASSIUM SERPL-SCNC: 5 MMOL/L — SIGNIFICANT CHANGE UP (ref 3.5–5)
PROT SERPL-MCNC: 5.3 G/DL — LOW (ref 6–8)
PROTHROM AB SERPL-ACNC: 11.5 SEC — SIGNIFICANT CHANGE UP (ref 9.95–12.87)
RBC # BLD: 3.35 M/UL — LOW (ref 4.7–6.1)
RBC # BLD: 3.63 M/UL — LOW (ref 4.7–6.1)
RBC # BLD: 3.95 M/UL — LOW (ref 4.7–6.1)
RBC # FLD: 14.8 % — HIGH (ref 11.5–14.5)
RBC # FLD: 15 % — HIGH (ref 11.5–14.5)
RBC # FLD: 15.1 % — HIGH (ref 11.5–14.5)
SAO2 % BLDA: 98 % — HIGH (ref 92–96)
SODIUM SERPL-SCNC: 147 MMOL/L — HIGH (ref 135–146)
TRIGL SERPL-MCNC: 122 MG/DL — SIGNIFICANT CHANGE UP (ref 10–149)
WBC # BLD: 11.06 K/UL — HIGH (ref 4.8–10.8)
WBC # BLD: 12.46 K/UL — HIGH (ref 4.8–10.8)
WBC # BLD: 14.28 K/UL — HIGH (ref 4.8–10.8)
WBC # FLD AUTO: 11.06 K/UL — HIGH (ref 4.8–10.8)
WBC # FLD AUTO: 12.46 K/UL — HIGH (ref 4.8–10.8)
WBC # FLD AUTO: 14.28 K/UL — HIGH (ref 4.8–10.8)

## 2020-04-12 PROCEDURE — 71045 X-RAY EXAM CHEST 1 VIEW: CPT | Mod: 26

## 2020-04-12 PROCEDURE — 99291 CRITICAL CARE FIRST HOUR: CPT

## 2020-04-12 RX ORDER — HEPARIN SODIUM 5000 [USP'U]/ML
700 INJECTION INTRAVENOUS; SUBCUTANEOUS
Qty: 25000 | Refills: 0 | Status: DISCONTINUED | OUTPATIENT
Start: 2020-04-12 | End: 2020-04-13

## 2020-04-12 RX ORDER — FUROSEMIDE 40 MG
40 TABLET ORAL ONCE
Refills: 0 | Status: COMPLETED | OUTPATIENT
Start: 2020-04-12 | End: 2020-04-12

## 2020-04-12 RX ORDER — HEPARIN SODIUM 5000 [USP'U]/ML
650 INJECTION INTRAVENOUS; SUBCUTANEOUS
Qty: 25000 | Refills: 0 | Status: DISCONTINUED | OUTPATIENT
Start: 2020-04-12 | End: 2020-04-12

## 2020-04-12 RX ADMIN — LACTULOSE 10 GRAM(S): 10 SOLUTION ORAL at 05:19

## 2020-04-12 RX ADMIN — CLOPIDOGREL BISULFATE 75 MILLIGRAM(S): 75 TABLET, FILM COATED ORAL at 12:14

## 2020-04-12 RX ADMIN — Medication 40 MILLIGRAM(S): at 17:15

## 2020-04-12 RX ADMIN — Medication 325 MILLIGRAM(S): at 05:19

## 2020-04-12 RX ADMIN — FAMOTIDINE 20 MILLIGRAM(S): 10 INJECTION INTRAVENOUS at 12:14

## 2020-04-12 RX ADMIN — Medication 10 MILLIGRAM(S): at 12:14

## 2020-04-12 RX ADMIN — CHLORHEXIDINE GLUCONATE 15 MILLILITER(S): 213 SOLUTION TOPICAL at 17:16

## 2020-04-12 RX ADMIN — CHLORHEXIDINE GLUCONATE 15 MILLILITER(S): 213 SOLUTION TOPICAL at 05:19

## 2020-04-12 RX ADMIN — HEPARIN SODIUM 650 UNIT(S)/HR: 5000 INJECTION INTRAVENOUS; SUBCUTANEOUS at 12:15

## 2020-04-12 RX ADMIN — CHLORHEXIDINE GLUCONATE 1 APPLICATION(S): 213 SOLUTION TOPICAL at 05:20

## 2020-04-12 NOTE — PROGRESS NOTE ADULT - SUBJECTIVE AND OBJECTIVE BOX
Nephrology progress note    Patient is seen and examined, events over the last 24 h noted .    Allergies:  No Known Allergies    Hospital Medications:   MEDICATIONS  (STANDING):  chlorhexidine 0.12% Liquid 15 milliLiter(s) Oral Mucosa every 12 hours  chlorhexidine 4% Liquid 1 Application(s) Topical <User Schedule>  clevidipine Infusion 1 mG/Hr (2 mL/Hr) IV Continuous <Continuous>  clopidogrel Tablet 75 milliGRAM(s) Oral daily  dexMEDEtomidine Infusion 0.01 MICROgram(s)/kG/Hr (0.24 mL/Hr) IV Continuous <Continuous>  dextrose 10%. 250 milliLiter(s) (500 mL/Hr) IV Continuous <Continuous>  dextrose 50% Injectable 50 milliLiter(s) IV Push every 15 minutes  famotidine    Tablet 20 milliGRAM(s) Oral daily  heparin  Infusion. 550 Unit(s)/Hr (5.5 mL/Hr) IV Continuous <Continuous>  insulin regular Infusion 4 Unit(s)/Hr (4 mL/Hr) IV Continuous <Continuous>  lactulose Syrup 10 Gram(s) Oral three times a day  midazolam Infusion 0.02 mG/kG/Hr (1.96 mL/Hr) IV Continuous <Continuous>  senna 2 Tablet(s) Oral at bedtime  sodium bicarbonate 325 milliGRAM(s) Oral two times a day        VITALS:  T(F): 98.8 (04-12-20 @ 06:00), Max: 98.8 (04-12-20 @ 06:00)  HR: 56 (04-12-20 @ 06:00)  BP: --  RR: 21 (04-12-20 @ 06:00)  SpO2: 97% (04-12-20 @ 06:00)  Wt(kg): --    04-10 @ 07:01  -  04-11 @ 07:00  --------------------------------------------------------  IN: 1594 mL / OUT: 1070 mL / NET: 524 mL    04-11 @ 07:01  -  04-12 @ 07:00  --------------------------------------------------------  IN: 384.9 mL / OUT: 1710 mL / NET: -1325.1 mL          PHYSICAL EXAM:  Constitutional: NAD  HEENT: anicteric sclera, oropharynx clear, MMM  Neck: No JVD  Respiratory: CTAB, no wheezes, rales or rhonchi  Cardiovascular: S1, S2, RRR  Gastrointestinal: BS+, soft, NT/ND  Extremities: No cyanosis or clubbing. No peripheral edema  :  No sosa.   Skin: No rashes    LABS:  04-12    147<H>  |  110  |  68<HH>  ----------------------------<  112<H>  5.0   |  27  |  1.8<H>    Ca    7.7<L>      12 Apr 2020 01:10  Phos  3.3     04-12  Mg     2.9     04-12    TPro  5.3<L>  /  Alb  2.6<L>  /  TBili  0.5  /  DBili      /  AST  22  /  ALT  42<H>  /  AlkPhos  102  04-12                          9.9    11.06 )-----------( 242      ( 12 Apr 2020 01:10 )             31.4       Urine Studies:      RADIOLOGY & ADDITIONAL STUDIES:

## 2020-04-12 NOTE — PROGRESS NOTE ADULT - ASSESSMENT
Assessment and Plan:   · Assessment		  Neurologic:   Sedated with precedex and versed- weaning down   Watch for bradycardia    Respiratory:   acute hypoxic respiratory failure due to COVID PNA s/p intubation 4/7  -/23/40/10  cont to try and wean vent settings    Cardiovascular:   Acute HTN- on cleviprex, keep normotensive, MAP >65  -on Plavix for h/o PCI/TEMI  -holding BB and Cardizem due to bradycardia -HR 50s  -on Heparin gtt for new onset Afib - at 550/hr PTT 83.4 > 70 > 60  -Troponin 0.04, stable     Gastrointestinal/Nutrition:  NPO with TF- Peptamen AF @ 50. Held TF, no residual. Placed OGT to suction 2/2 to abdominal distenstion. Restarted TF.   PPI and Senna, lactulose, monitor for BM (apparently none for 5 days)  Triglycerides 102     Genitourinary/Renal:   KEEGAN on CKD resolving, non-oliguric (peak Cr 4.2)  BUN/Cr 80/2.1   UOP drpped to 5cc during the day, gave 250cc bolus- responded. UOP 40cc/hr   keep sosa for strict I and Os  Na 144/K 4.9/ Mg 3.2/ Phos 3.8   lactate 1.9  > 1.4    Hematologic:   Hb 10/31, on Heparin gtt @ 550u/hr   PTT 83.4 > 70  Hgb 9.6, stable     Infectious Disease:   COVID-19 PNA - completed course of Plaquenil/Azithromycin and Anakinra x 3days  -procalcitonin 0.14, will follow levels  -WBC 16>10.8> 15.6   -Afebrile   -CRP 4 >3 > 1, ESR 61    Endocrine:   Hyperglycemia- on insulin drip   HbA1C - 6.8

## 2020-04-12 NOTE — CHART NOTE - NSCHARTNOTEFT_GEN_A_CORE
Rounded with Burn Unit Team  Proceeded to call the following individuals:  Called Dr. Davila [phone] - discussed vent moves (PEEP 10 -->9)  Called Matthew Orellana [phone] - PHONE BUSY (multiple attempts - his brother Adan stated that he would pass on the information)  Called Matthew Arellano [phone] - received previous voicemail and discussed small improvements in resp. mechanics    Discussed:  - mild respiratory improvement  - PEEP 12 --> 11     Questions answered

## 2020-04-12 NOTE — PROGRESS NOTE ADULT - SUBJECTIVE AND OBJECTIVE BOX
KEZIA MOYER  74y, Male  Allergy: No Known Allergies      LOS  17d    CHIEF COMPLAINT: COVID-19 Pneumonia (10 Apr 2020 09:36)      INTERVAL EVENTS/HPI  - T(F): , Max: 99.2 (04-12-20 @ 08:00), magalie  - WBC Count: 11.06 (04-12-20 @ 01:10)  WBC Count: 15.68 (04-10-20 @ 23:00)  - Creatinine, Serum: 1.8 (04-12-20 @ 01:10)  Creatinine, Serum: 1.8 (04-11-20 @ 16:00)       ROS  cannot obtain secondary to patient's sedation and/or mental status    VITALS:  T(F): 96.6, Max: 99.2 (04-12-20 @ 08:00)  HR: 70  BP: 156/69  RR: 21Vital Signs Last 24 Hrs  T(C): 35.9 (12 Apr 2020 14:00), Max: 37.3 (12 Apr 2020 08:00)  T(F): 96.6 (12 Apr 2020 14:00), Max: 99.2 (12 Apr 2020 08:00)  HR: 70 (12 Apr 2020 14:00) (50 - 74)  BP: 156/69 (12 Apr 2020 08:00) (156/69 - 156/69)  BP(mean): 99 (12 Apr 2020 08:00) (99 - 99)  RR: 21 (12 Apr 2020 14:00) (17 - 28)  SpO2: 99% (12 Apr 2020 14:00) (97% - 100%)    PHYSICAL EXAM:  Gen: Intubated  CV: RRR  Lungs: Bilateral chest expansion  Neuro: Sedated  Lines    FH: Non-contributory  Social Hx: Non-contributory    TESTS & MEASUREMENTS:                        9.9    11.06 )-----------( 242      ( 12 Apr 2020 01:10 )             31.4     04-12    147<H>  |  110  |  68<HH>  ----------------------------<  112<H>  5.0   |  27  |  1.8<H>    Ca    7.7<L>      12 Apr 2020 01:10  Phos  3.3     04-12  Mg     2.9     04-12    TPro  5.3<L>  /  Alb  2.6<L>  /  TBili  0.5  /  DBili  x   /  AST  22  /  ALT  42<H>  /  AlkPhos  102  04-12    eGFR if Non African American: 37 mL/min/1.73M2 (04-12-20 @ 01:10)  eGFR if African American: 42 mL/min/1.73M2 (04-12-20 @ 01:10)  eGFR if Non African American: 37 mL/min/1.73M2 (04-11-20 @ 16:00)  eGFR if : 42 mL/min/1.73M2 (04-11-20 @ 16:00)    LIVER FUNCTIONS - ( 12 Apr 2020 01:10 )  Alb: 2.6 g/dL / Pro: 5.3 g/dL / ALK PHOS: 102 U/L / ALT: 42 U/L / AST: 22 U/L / GGT: x                 Lactate, Blood: 1.9 mmol/L (04-10-20 @ 23:00)      INFECTIOUS DISEASES TESTING  Procalcitonin, Serum: 0.18 (04-10-20 @ 23:00)  Procalcitonin, Serum: 0.15 (04-10-20 @ 00:58)  Procalcitonin, Serum: 0.14 (04-08-20 @ 04:30)  Procalcitonin, Serum: 0.14 (04-06-20 @ 11:17)  Procalcitonin, Serum: 0.15 (04-06-20 @ 05:42)  Procalcitonin, Serum: 0.25 (04-04-20 @ 10:02)  Procalcitonin, Serum: 0.29 (04-03-20 @ 06:48)  Procalcitonin, Serum: 0.22 (04-01-20 @ 06:25)  Procalcitonin, Serum: 0.22 (03-31-20 @ 04:30)  Procalcitonin, Serum: 0.28 (03-26-20 @ 06:10)  COVID-19 PCR: Detected (03-25-20 @ 23:27)  Flu B Result: Negative (03-25-20 @ 23:16)  RSV Result: Negative (03-25-20 @ 23:16)  Flu A Result: Negative (03-25-20 @ 23:16)      INFLAMMATORY MARKERS  C-Reactive Protein, Serum: 0.88 mg/dL (04-10-20 @ 23:00)  Sedimentation Rate, Erythrocyte: 61 mm/Hr (04-10-20 @ 23:00)  C-Reactive Protein, Serum: 1.46 mg/dL (04-08-20 @ 04:30)  C-Reactive Protein, Serum: 3.76 mg/dL (04-06-20 @ 11:17)      RADIOLOGY & ADDITIONAL TESTS:  I have personally reviewed the last available Chest xray  CXR      CT      CARDIOLOGY TESTING  12 Lead ECG:   Systolic  mmHg    Diastolic BP 57 mmHg    Ventricular Rate 74 BPM    Atrial Rate 74 BPM    P-R Interval 144 ms    QRS Duration 84 ms    Q-T Interval 386 ms    QTC Calculation(Bezet) 428 ms    P Axis 67 degrees    R Axis 58 degrees    T Axis 65 degrees    Diagnosis Line Sinus rhythm with occasional Premature ventricular complexes  Lateral infarct , age undetermined  Technically poor tracing  V3 and V6 have severe artifact  Abnormal ECG    Confirmed by AD GONZALEZ MD ([id number]) on 4/10/2020 10:13:47 PM (04-10-20 @ 18:54)  12 Lead ECG:   Ventricular Rate 69 BPM    Atrial Rate 69 BPM    P-R Interval 146 ms    QRS Duration 86 ms    Q-T Interval 500 ms    QTC Calculation(Bezet) 535 ms    P Axis 52 degrees    R Axis 63 degrees    T Axis 69 degrees    Diagnosis Line Normal sinus rhythm  Increased R/S ratio in V1, consider early transition or posterior infarct  Prolonged QT  Abnormal ECG    Confirmed by Scooter Ortega ([id number]) on 4/8/2020 11:30:53 AM (04-08-20 @ 10:05)      MEDICATIONS  bisacodyl Suppository 10  chlorhexidine 0.12% Liquid 15  chlorhexidine 4% Liquid 1  clevidipine Infusion 1  clopidogrel Tablet 75  dexMEDEtomidine Infusion 0.01  dextrose 10%. 250  dextrose 50% Injectable 50  famotidine    Tablet 20  heparin  Infusion. 650  insulin regular Infusion 4  lactulose Syrup 10  midazolam Infusion 0.02  senna 2  sodium bicarbonate 325      WEIGHT  Weight (kg): 95 (04-09-20 @ 22:15)  Creatinine, Serum: 1.8 mg/dL (04-12-20 @ 01:10)  Creatinine, Serum: 1.8 mg/dL (04-11-20 @ 16:00)      ANTIBIOTICS:      All available historical records have been reviewed

## 2020-04-12 NOTE — PROGRESS NOTE ADULT - SUBJECTIVE AND OBJECTIVE BOX
KEZIA MOYER  [id number]  74y Male    Indication for ICU admission: COVID PNA, intubated on , transferred from F9 to burn ICU for further treatment    Admit Date:20  ICU Date:       No Known Allergies    PAST MEDICAL & SURGICAL HISTORY:  CAD (coronary artery disease)  Kidney stones  High cholesterol  HTN (hypertension)  S/P drug eluting coronary stent placement: &gt; 10 years ago  CKD    Home Medications:  clopidogrel 75 mg oral tablet: 1 tab(s) orally once a day (30 Mar 2020 10:01)  FEBUXOSTAT  40 MG TABS:  (26 Mar 2020 01:49)  FUROSEMIDE  40 MG TABS:  (26 Mar 2020 01:49)  metoprolol tartrate 25 mg oral tablet: 1 tab(s) orally 2 times a day (30 Mar 2020 10:01)  PIOGLITAZONE HYDROCHLORIDE  30 MG TABS:  (26 Mar 2020 01:49)  ROSUVASTATIN CALCIUM  20 MG TABS:  (26 Mar 2020 01:48)      24HRS EVENT:    Overnight: Switched from Propofol to Precedex. Precedex makes patient bradycardic. HR in 50s. Dr Rizvi aware. Heparin gtt at 550 cc/hr. Last PTT from 23:30 was 57. Next PTT 6 AM, F/U. Still maintaining cleviprex gtt. Tolerating TF.     Neurologic:   started Precedex at 4pm, weaning Versed    Respiratory:   acute hypoxic respiratory failure due to COVID PNA s/p intubation   -/23/40/10 per [facility] decrease PEEP by 1 q 4 hrs  AM ABG 7.45/61/101/29   AM CXR     Cardiovascular:   Acute HTN- on Cleviprex, keep normotensive, MAP >65  -on Plavix for h/o PCI/TEMI  -holding BB and Cardizem due to bradycardia -HR 50s  -on Heparin gtt for new onset Afib - at 550/hr   -Troponin 0.04, stable     Gastrointestinal/Nutrition:  NPO with TF- Peptamen AF @ 50. Tolerating feeds    PPI and Senna, lactulose, monitor for BM (apparently none for 5 days)  Triglycerides 102>122     Genitourinary/Renal:   KEEGAN on CKD resolving, non-oliguric (peak Cr 4.2)  Cr 1.8 (stable)   UOP 60-100cc/hr   keep sosa for strict I and Os  Na 147/ K 5.0/ Mg 2.9/ Phos 3.8     Hematologic:   on Hep gtt 550units/hr, last PTT 57, F/U 6AM PTT   Hgb stable 9.9     Infectious Disease:   COVID-19 PNA - completed course of Plaquenil/Azithromycin and Anakinra x 3days  -procalcitonin 0.14, will follow levels  -WBC 16>10.8> 15.6> 9.9   -Afebrile     Endocrine:   Hyperglycemia- on insulin drip   -188  HbA1C - 6.8    DVT PTX: Heparin gtt  GI PTX: Pepcid    ***Tubes/Lines/Drains  ***  Peripheral IV  Central Venous Line   left IJ  	Date -> new R IJ TLC  as left not secured, migrated   Arterial Line    right radial		                Date     Urinary Catheter		Indication: Strict I&O    Date Placed:     REVIEW OF SYSTEMS    [ ] A ten-point review of systems was otherwise negative except as noted.  [X ] Due to altered mental status/intubation, subjective information were not able to be obtained from the patient. History was obtained, to the extent possible, from review of the chart and collateral sources of information.    Daily     Daily Weight in k.6 (2020 08:00)    Diet, NPO with Tube Feed:   Tube Feeding Modality: Orogastric  Peptamen A.F. Formula  Total Volume for 24 Hours (mL): 1200  Continuous  Starting Tube Feed Rate mL per Hour: 50  Until Goal Tube Feed Rate (mL per Hour): 50  Tube Feed Duration (in Hours): 24  Tube Feed Start Time: 23:40  Beneprotein ([facility] Only)     Qty per Day:  2 PACKS/DAY (20 @ 23:41)      CURRENT MEDS:  Neurologic Medications  acetaminophen   Tablet .. 650 milliGRAM(s) Oral every 6 hours PRN Temp greater or equal to 38C (100.4F)  dexMEDEtomidine Infusion 0.01 MICROgram(s)/kG/Hr IV Continuous <Continuous>  midazolam Infusion 0.02 mG/kG/Hr IV Continuous <Continuous>    Respiratory Medications    Cardiovascular Medications  clevidipine Infusion 1 mG/Hr IV Continuous <Continuous>    Gastrointestinal Medications  dextrose 10%. 250 milliLiter(s) IV Continuous <Continuous>  famotidine    Tablet 20 milliGRAM(s) Oral daily  lactulose Syrup 10 Gram(s) Oral three times a day  senna 2 Tablet(s) Oral at bedtime  sodium bicarbonate 325 milliGRAM(s) Oral two times a day    Genitourinary Medications    Hematologic/Oncologic Medications  clopidogrel Tablet 75 milliGRAM(s) Oral daily  heparin  Infusion. 550 Unit(s)/Hr IV Continuous <Continuous>    Antimicrobial/Immunologic Medications    Endocrine/Metabolic Medications  dextrose 50% Injectable 50 milliLiter(s) IV Push every 15 minutes  insulin regular Infusion 4 Unit(s)/Hr IV Continuous <Continuous>    Topical/Other Medications  chlorhexidine 0.12% Liquid 15 milliLiter(s) Oral Mucosa every 12 hours  chlorhexidine 4% Liquid 1 Application(s) Topical <User Schedule>      ICU Vital Signs Last 24 Hrs  T(C): 37.1 (2020 06:00), Max: 37.1 (2020 06:00)  T(F): 98.8 (2020 06:00), Max: 98.8 (2020 06:00)  HR: 56 (2020 06:00) (50 - 74)  BP: --  BP(mean): --  ABP: 118/47 (2020 06:00) (105/44 - 157/54)  ABP(mean): 68 (2020 06:00) (61 - 85)  RR: 21 (2020 06:00) (18 - 28)  SpO2: 97% (2020 06:00) (97% - 100%)        Mode: AC/ CMV (Assist Control/ Continuous Mandatory Ventilation)  RR (machine): 21  TV (machine): 400  FiO2: 40  PEEP: 10  MAP: 18  PIP: 28    ABG - ( 2020 04:26 )  pH, Arterial: 7.45  pH, Blood: x     /  pCO2: 41    /  pO2: 101   / HCO3: 29    / Base Excess: 4.2   /  SaO2: 98                  I&O's Summary    2020 07:01  -  2020 07:00  --------------------------------------------------------  IN: 384.9 mL / OUT: 1710 mL / NET: -1325.1 mL      I&O's Detail    2020 07:01  -  2020 07:00  --------------------------------------------------------  IN:    clevidipine Infusion: 74 mL    dexmedetomidine Infusion: 68.4 mL    heparin  Infusion.: 126.5 mL    insulin regular Infusion: 30 mL    midazolam Infusion: 86 mL  Total IN: 384.9 mL    OUT:    Indwelling Catheter - Urethral: 1710 mL  Total OUT: 1710 mL    Total NET: -1325.1 mL            PHYSICAL EXAM:    NEURO:        Sedation (    ) No  (  x  ) Yes    Exam:     CHEST/LUNG:  Vent (    ) No  (  x  ) Yes   Room air (   )   Nasal canula (   ) ____ liters   Breath sounds:  coarse    HEART:  S1/S2 (    )    NSR ( x   )    Sinus tachycardia (   )    Arrythmia (    ) No (  x  ) Yes  Type: Afib    ABDOMEN:   Distended (  x  ) No  (    ) Yes      Tenderness (    ) No (    ) Yes    (   x ) N/A    BS (    ) No (    x) Yes         EXTREMITIES:  Edema (x    ) No (    ) Yes           Calf tenderness (    ) No (    ) Yes  (  x  ) N/A    Pulses:  palpable (    ) No (  x  ) Yes    Doppler (    ) No (    ) Yes     SCD's (   ) No  (   ) Yes        CXR:     LABS:  Labs:  CAPILLARY BLOOD GLUCOSE      POCT Blood Glucose.: 195 mg/dL (2020 07:52)  POCT Blood Glucose.: 165 mg/dL (2020 05:53)  POCT Blood Glucose.: 147 mg/dL (2020 03:54)  POCT Blood Glucose.: 118 mg/dL (2020 02:31)  POCT Blood Glucose.: 128 mg/dL (2020 23:47)  POCT Blood Glucose.: 163 mg/dL (2020 21:51)  POCT Blood Glucose.: 214 mg/dL (2020 19:48)  POCT Blood Glucose.: 213 mg/dL (2020 18:19)  POCT Blood Glucose.: 188 mg/dL (2020 15:37)  POCT Blood Glucose.: 142 mg/dL (2020 12:28)  POCT Blood Glucose.: 91 mg/dL (2020 11:05)  POCT Blood Glucose.: 99 mg/dL (2020 10:04)  POCT Blood Glucose.: 150 mg/dL (2020 08:38)                          9.9    11.06 )-----------( 242      ( 2020 01:10 )             31.4       Auto Neutrophil %: 69.9 % (20 @ 01:10)  Auto Immature Granulocyte %: 5.2 % (20 @ 01:10)        147<H>  |  110  |  68<HH>  ----------------------------<  112<H>  5.0   |  27  |  1.8<H>      Calcium, Total Serum: 7.7 mg/dL (20 @ 01:10)      LFTs:             5.3  | 0.5  | 22       ------------------[102     ( 2020 01:10 )  2.6  | x    | 42          Lipase:x      Amylase:x         Blood Gas Arterial, Lactate: 1.1 mmoL/L (20 @ 04:26)  Blood Gas Arterial, Lactate: 1.0 mmoL/L (20 @ 17:58)  Blood Gas Arterial, Lactate: 1.4 mmoL/L (20 @ 04:30)  Lactate, Blood: 1.9 mmol/L (04-10-20 @ 23:00)  Blood Gas Arterial, Lactate: 1.1 mmoL/L (04-10-20 @ 14:07)  Blood Gas Arterial, Lactate: 1.2 mmoL/L (04-10-20 @ 05:18)    ABG - ( 2020 04:26 )  pH: 7.45  /  pCO2: 41    /  pO2: 101   / HCO3: 29    / Base Excess: 4.2   /  SaO2: 98              ABG - ( 2020 17:58 )  pH: 7.41  /  pCO2: 46    /  pO2: 96    / HCO3: 29    / Base Excess: 4.0   /  SaO2: 97              ABG - ( 2020 04:30 )  pH: 7.43  /  pCO2: 41    /  pO2: 110   / HCO3: 27    / Base Excess: 2.7   /  SaO2: 99                Coags:     x      ----< x       ( 2020 06:00 )     48.7        CARDIAC MARKERS ( 10 Apr 2020 23:00 )  x     / 0.04 ng/mL / 47 U/L / x     / x

## 2020-04-12 NOTE — PROGRESS NOTE ADULT - ASSESSMENT
74y old Male who presents with a chief complaint of cough x 1 week  Currently admitted to medicine with the primary diagnosis of Acute hypoxic respiratory failure secondary to SARS COv2 pneumonia    IMPRESSION:  Sepsis with SIRS due to COVID 19 PNA, intubated 4/7    CXR with diffuse bilateral opacities , white out-- now decreased on L     Procalcitonin, Serum: 0.18 (04-10-20 @ 23:00)  #Cytokine Release Syndrome   C-Reactive Protein, Serum: 1.46 mg/dL (04-08-20 @ 04:30)  - s/p azithro/plaqeunil/ Anakinra 100mg SC q6h x 3 days   On methylPREDNISolone sodium succinate Injectable 60  heparin  Infusion 600  #hyponatremia    WBC downtrending     RECOMMENDATIONS:  Observe off antibiotics   - If fever, repeat procalcitonin

## 2020-04-12 NOTE — PROGRESS NOTE ADULT - ASSESSMENT
dm, htn, cad. cva. bernard labs noted. covid positive. pulmoncary function noted. making urine. no indication for RRT today.

## 2020-04-13 LAB
ALBUMIN SERPL ELPH-MCNC: 2.9 G/DL — LOW (ref 3.5–5.2)
ALP SERPL-CCNC: 109 U/L — SIGNIFICANT CHANGE UP (ref 30–115)
ALT FLD-CCNC: 42 U/L — HIGH (ref 0–41)
ANION GAP SERPL CALC-SCNC: 11 MMOL/L — SIGNIFICANT CHANGE UP (ref 7–14)
ANION GAP SERPL CALC-SCNC: 12 MMOL/L — SIGNIFICANT CHANGE UP (ref 7–14)
APTT BLD: 28.4 SEC — SIGNIFICANT CHANGE UP (ref 27–39.2)
APTT BLD: 60 SEC — HIGH (ref 27–39.2)
APTT BLD: 60.8 SEC — HIGH (ref 27–39.2)
APTT BLD: 61.9 SEC — HIGH (ref 27–39.2)
AST SERPL-CCNC: 29 U/L — SIGNIFICANT CHANGE UP (ref 0–41)
BASE EXCESS BLDA CALC-SCNC: 5 MMOL/L — HIGH (ref -2–2)
BASE EXCESS BLDA CALC-SCNC: 6.9 MMOL/L — HIGH (ref -2–2)
BILIRUB SERPL-MCNC: 0.9 MG/DL — SIGNIFICANT CHANGE UP (ref 0.2–1.2)
BUN SERPL-MCNC: 52 MG/DL — HIGH (ref 10–20)
BUN SERPL-MCNC: 55 MG/DL — HIGH (ref 10–20)
CALCIUM SERPL-MCNC: 7.9 MG/DL — LOW (ref 8.5–10.1)
CALCIUM SERPL-MCNC: 8.2 MG/DL — LOW (ref 8.5–10.1)
CHLORIDE SERPL-SCNC: 107 MMOL/L — SIGNIFICANT CHANGE UP (ref 98–110)
CHLORIDE SERPL-SCNC: 107 MMOL/L — SIGNIFICANT CHANGE UP (ref 98–110)
CK SERPL-CCNC: 113 U/L — SIGNIFICANT CHANGE UP (ref 0–225)
CK SERPL-CCNC: 90 U/L — SIGNIFICANT CHANGE UP (ref 0–225)
CO2 SERPL-SCNC: 28 MMOL/L — SIGNIFICANT CHANGE UP (ref 17–32)
CO2 SERPL-SCNC: 30 MMOL/L — SIGNIFICANT CHANGE UP (ref 17–32)
CREAT SERPL-MCNC: 1.7 MG/DL — HIGH (ref 0.7–1.5)
CREAT SERPL-MCNC: 1.8 MG/DL — HIGH (ref 0.7–1.5)
D DIMER BLD IA.RAPID-MCNC: 644 NG/ML DDU — HIGH (ref 0–230)
ERYTHROCYTE [SEDIMENTATION RATE] IN BLOOD: >140 MM/HR — HIGH (ref 0–10)
GLUCOSE BLDC GLUCOMTR-MCNC: 155 MG/DL — HIGH (ref 70–99)
GLUCOSE BLDC GLUCOMTR-MCNC: 201 MG/DL — HIGH (ref 70–99)
GLUCOSE BLDC GLUCOMTR-MCNC: 266 MG/DL — HIGH (ref 70–99)
GLUCOSE BLDC GLUCOMTR-MCNC: 278 MG/DL — HIGH (ref 70–99)
GLUCOSE BLDC GLUCOMTR-MCNC: 281 MG/DL — HIGH (ref 70–99)
GLUCOSE BLDC GLUCOMTR-MCNC: 290 MG/DL — HIGH (ref 70–99)
GLUCOSE SERPL-MCNC: 127 MG/DL — HIGH (ref 70–99)
GLUCOSE SERPL-MCNC: 303 MG/DL — HIGH (ref 70–99)
HCO3 BLDA-SCNC: 31 MMOL/L — HIGH (ref 21–29)
HCO3 BLDA-SCNC: 31 MMOL/L — HIGH (ref 23–27)
HCT VFR BLD CALC: 33.6 % — LOW (ref 42–52)
HGB BLD-MCNC: 10.4 G/DL — LOW (ref 14–18)
INR BLD: 1.03 RATIO — SIGNIFICANT CHANGE UP (ref 0.65–1.3)
INR BLD: 1.09 RATIO — SIGNIFICANT CHANGE UP (ref 0.65–1.3)
LDH SERPL L TO P-CCNC: 437 U/L — HIGH (ref 50–242)
MAGNESIUM SERPL-MCNC: 2.6 MG/DL — HIGH (ref 1.8–2.4)
MAGNESIUM SERPL-MCNC: 2.8 MG/DL — HIGH (ref 1.8–2.4)
MCHC RBC-ENTMCNC: 29.1 PG — SIGNIFICANT CHANGE UP (ref 27–31)
MCHC RBC-ENTMCNC: 31 G/DL — LOW (ref 32–37)
MCV RBC AUTO: 93.9 FL — SIGNIFICANT CHANGE UP (ref 80–94)
NRBC # BLD: 0 /100 WBCS — SIGNIFICANT CHANGE UP (ref 0–0)
PCO2 BLDA: 40 MMHG — SIGNIFICANT CHANGE UP (ref 38–42)
PCO2 BLDA: 47 MMHG — HIGH (ref 38–42)
PH BLDA: 7.43 — HIGH (ref 7.38–7.42)
PH BLDA: 7.49 — HIGH (ref 7.38–7.42)
PHOSPHATE SERPL-MCNC: 3.5 MG/DL — SIGNIFICANT CHANGE UP (ref 2.1–4.9)
PHOSPHATE SERPL-MCNC: 4.1 MG/DL — SIGNIFICANT CHANGE UP (ref 2.1–4.9)
PLATELET # BLD AUTO: 229 K/UL — SIGNIFICANT CHANGE UP (ref 130–400)
PO2 BLDA: 88 MMHG — SIGNIFICANT CHANGE UP (ref 78–95)
PO2 BLDA: 96 MMHG — HIGH (ref 78–95)
POTASSIUM SERPL-MCNC: 4.7 MMOL/L — SIGNIFICANT CHANGE UP (ref 3.5–5)
POTASSIUM SERPL-MCNC: 5.3 MMOL/L — HIGH (ref 3.5–5)
POTASSIUM SERPL-SCNC: 4.7 MMOL/L — SIGNIFICANT CHANGE UP (ref 3.5–5)
POTASSIUM SERPL-SCNC: 5.3 MMOL/L — HIGH (ref 3.5–5)
PROCALCITONIN SERPL-MCNC: 0.2 NG/ML — HIGH (ref 0.02–0.1)
PROCALCITONIN SERPL-MCNC: 0.27 NG/ML — HIGH (ref 0.02–0.1)
PROT SERPL-MCNC: 5.8 G/DL — LOW (ref 6–8)
PROTHROM AB SERPL-ACNC: 11.9 SEC — SIGNIFICANT CHANGE UP (ref 9.95–12.87)
PROTHROM AB SERPL-ACNC: 12.5 SEC — SIGNIFICANT CHANGE UP (ref 9.95–12.87)
RBC # BLD: 3.58 M/UL — LOW (ref 4.7–6.1)
RBC # FLD: 15.4 % — HIGH (ref 11.5–14.5)
SAO2 % BLDA: 96 % — SIGNIFICANT CHANGE UP (ref 92–96)
SAO2 % BLDA: 98 % — SIGNIFICANT CHANGE UP (ref 94–98)
SODIUM SERPL-SCNC: 147 MMOL/L — HIGH (ref 135–146)
SODIUM SERPL-SCNC: 148 MMOL/L — HIGH (ref 135–146)
TRIGL SERPL-MCNC: 141 MG/DL — SIGNIFICANT CHANGE UP (ref 10–149)
TROPONIN T SERPL-MCNC: 0.05 NG/ML — CRITICAL HIGH
WBC # BLD: 14.12 K/UL — HIGH (ref 4.8–10.8)
WBC # FLD AUTO: 14.12 K/UL — HIGH (ref 4.8–10.8)

## 2020-04-13 PROCEDURE — 99291 CRITICAL CARE FIRST HOUR: CPT | Mod: CS

## 2020-04-13 PROCEDURE — 93010 ELECTROCARDIOGRAM REPORT: CPT

## 2020-04-13 PROCEDURE — 71045 X-RAY EXAM CHEST 1 VIEW: CPT | Mod: 26,CS

## 2020-04-13 RX ORDER — ATORVASTATIN CALCIUM 80 MG/1
20 TABLET, FILM COATED ORAL AT BEDTIME
Refills: 0 | Status: DISCONTINUED | OUTPATIENT
Start: 2020-04-13 | End: 2020-04-19

## 2020-04-13 RX ORDER — DILTIAZEM HCL 120 MG
5 CAPSULE, EXT RELEASE 24 HR ORAL
Qty: 125 | Refills: 0 | Status: DISCONTINUED | OUTPATIENT
Start: 2020-04-13 | End: 2020-04-14

## 2020-04-13 RX ORDER — FUROSEMIDE 40 MG
40 TABLET ORAL DAILY
Refills: 0 | Status: DISCONTINUED | OUTPATIENT
Start: 2020-04-14 | End: 2020-04-17

## 2020-04-13 RX ORDER — AMLODIPINE BESYLATE 2.5 MG/1
5 TABLET ORAL DAILY
Refills: 0 | Status: DISCONTINUED | OUTPATIENT
Start: 2020-04-13 | End: 2020-04-14

## 2020-04-13 RX ORDER — METOPROLOL TARTRATE 50 MG
5 TABLET ORAL
Refills: 0 | Status: DISCONTINUED | OUTPATIENT
Start: 2020-04-13 | End: 2020-04-13

## 2020-04-13 RX ORDER — ASPIRIN/CALCIUM CARB/MAGNESIUM 324 MG
81 TABLET ORAL DAILY
Refills: 0 | Status: DISCONTINUED | OUTPATIENT
Start: 2020-04-13 | End: 2020-04-19

## 2020-04-13 RX ORDER — METOPROLOL TARTRATE 50 MG
25 TABLET ORAL EVERY 8 HOURS
Refills: 0 | Status: DISCONTINUED | OUTPATIENT
Start: 2020-04-13 | End: 2020-04-15

## 2020-04-13 RX ORDER — INSULIN HUMAN 100 [IU]/ML
INJECTION, SOLUTION SUBCUTANEOUS EVERY 6 HOURS
Refills: 0 | Status: DISCONTINUED | OUTPATIENT
Start: 2020-04-13 | End: 2020-04-13

## 2020-04-13 RX ORDER — METOPROLOL TARTRATE 50 MG
25 TABLET ORAL
Refills: 0 | Status: DISCONTINUED | OUTPATIENT
Start: 2020-04-13 | End: 2020-04-13

## 2020-04-13 RX ORDER — METOPROLOL TARTRATE 50 MG
5 TABLET ORAL ONCE
Refills: 0 | Status: COMPLETED | OUTPATIENT
Start: 2020-04-13 | End: 2020-04-13

## 2020-04-13 RX ORDER — FUROSEMIDE 40 MG
40 TABLET ORAL ONCE
Refills: 0 | Status: COMPLETED | OUTPATIENT
Start: 2020-04-13 | End: 2020-04-13

## 2020-04-13 RX ORDER — METOPROLOL TARTRATE 50 MG
10 TABLET ORAL ONCE
Refills: 0 | Status: COMPLETED | OUTPATIENT
Start: 2020-04-13 | End: 2020-04-13

## 2020-04-13 RX ORDER — DILTIAZEM HCL 120 MG
90 CAPSULE, EXT RELEASE 24 HR ORAL DAILY
Refills: 0 | Status: DISCONTINUED | OUTPATIENT
Start: 2020-04-13 | End: 2020-04-13

## 2020-04-13 RX ORDER — DILTIAZEM HCL 120 MG
10 CAPSULE, EXT RELEASE 24 HR ORAL ONCE
Refills: 0 | Status: COMPLETED | OUTPATIENT
Start: 2020-04-13 | End: 2020-04-13

## 2020-04-13 RX ORDER — DILTIAZEM HCL 120 MG
120 CAPSULE, EXT RELEASE 24 HR ORAL DAILY
Refills: 0 | Status: DISCONTINUED | OUTPATIENT
Start: 2020-04-13 | End: 2020-04-19

## 2020-04-13 RX ORDER — DILTIAZEM HCL 120 MG
10 CAPSULE, EXT RELEASE 24 HR ORAL ONCE
Refills: 0 | Status: DISCONTINUED | OUTPATIENT
Start: 2020-04-13 | End: 2020-04-13

## 2020-04-13 RX ORDER — APIXABAN 2.5 MG/1
5 TABLET, FILM COATED ORAL EVERY 12 HOURS
Refills: 0 | Status: DISCONTINUED | OUTPATIENT
Start: 2020-04-13 | End: 2020-04-19

## 2020-04-13 RX ORDER — INSULIN HUMAN 100 [IU]/ML
1 INJECTION, SOLUTION SUBCUTANEOUS
Qty: 100 | Refills: 0 | Status: DISCONTINUED | OUTPATIENT
Start: 2020-04-13 | End: 2020-04-14

## 2020-04-13 RX ADMIN — Medication 10 MILLIGRAM(S): at 14:18

## 2020-04-13 RX ADMIN — ATORVASTATIN CALCIUM 20 MILLIGRAM(S): 80 TABLET, FILM COATED ORAL at 21:51

## 2020-04-13 RX ADMIN — Medication 10 MILLIGRAM(S): at 14:17

## 2020-04-13 RX ADMIN — Medication 40 MILLIGRAM(S): at 13:49

## 2020-04-13 RX ADMIN — Medication 5 MILLIGRAM(S): at 13:52

## 2020-04-13 RX ADMIN — Medication 5 MILLIGRAM(S): at 13:51

## 2020-04-13 RX ADMIN — LACTULOSE 10 GRAM(S): 10 SOLUTION ORAL at 14:22

## 2020-04-13 RX ADMIN — Medication 25 MILLIGRAM(S): at 13:50

## 2020-04-13 RX ADMIN — CHLORHEXIDINE GLUCONATE 15 MILLILITER(S): 213 SOLUTION TOPICAL at 06:40

## 2020-04-13 RX ADMIN — Medication 25 MILLIGRAM(S): at 21:37

## 2020-04-13 RX ADMIN — LACTULOSE 10 GRAM(S): 10 SOLUTION ORAL at 21:39

## 2020-04-13 RX ADMIN — CLOPIDOGREL BISULFATE 75 MILLIGRAM(S): 75 TABLET, FILM COATED ORAL at 14:19

## 2020-04-13 RX ADMIN — AMLODIPINE BESYLATE 5 MILLIGRAM(S): 2.5 TABLET ORAL at 17:35

## 2020-04-13 RX ADMIN — Medication 81 MILLIGRAM(S): at 14:22

## 2020-04-13 RX ADMIN — APIXABAN 5 MILLIGRAM(S): 2.5 TABLET, FILM COATED ORAL at 17:34

## 2020-04-13 RX ADMIN — Medication 40 MILLIGRAM(S): at 21:37

## 2020-04-13 RX ADMIN — INSULIN HUMAN 11: 100 INJECTION, SOLUTION SUBCUTANEOUS at 17:32

## 2020-04-13 RX ADMIN — SENNA PLUS 2 TABLET(S): 8.6 TABLET ORAL at 21:37

## 2020-04-13 RX ADMIN — Medication 2 MG/HR: at 06:40

## 2020-04-13 RX ADMIN — Medication 120 MILLIGRAM(S): at 17:33

## 2020-04-13 RX ADMIN — FAMOTIDINE 20 MILLIGRAM(S): 10 INJECTION INTRAVENOUS at 14:19

## 2020-04-13 RX ADMIN — Medication 10 MILLIGRAM(S): at 17:31

## 2020-04-13 RX ADMIN — Medication 325 MILLIGRAM(S): at 17:35

## 2020-04-13 RX ADMIN — Medication 5 MG/HR: at 16:13

## 2020-04-13 RX ADMIN — INSULIN HUMAN 5: 100 INJECTION, SOLUTION SUBCUTANEOUS at 14:17

## 2020-04-13 RX ADMIN — CHLORHEXIDINE GLUCONATE 1 APPLICATION(S): 213 SOLUTION TOPICAL at 06:40

## 2020-04-13 NOTE — PROGRESS NOTE ADULT - SUBJECTIVE AND OBJECTIVE BOX
KEZIA MOYER  [id number]  74y Male    Indication for ICU admission:  Admit Date:03-26-20  ICU Date: 4/7/20  No Known Allergies    PAST MEDICAL & SURGICAL HISTORY:  CAD (coronary artery disease)  Kidney stones  High cholesterol  HTN (hypertension)  S/P drug eluting coronary stent placement: &gt; 10 years ago    Home Medications:  clopidogrel 75 mg oral tablet: 1 tab(s) orally once a day (30 Mar 2020 10:01)  FEBUXOSTAT  40 MG TABS:  (26 Mar 2020 01:49)  FUROSEMIDE  40 MG TABS:  (26 Mar 2020 01:49)  metoprolol tartrate 25 mg oral tablet: 1 tab(s) orally 2 times a day (30 Mar 2020 10:01)  PIOGLITAZONE HYDROCHLORIDE  30 MG TABS:  (26 Mar 2020 01:49)  ROSUVASTATIN CALCIUM  20 MG TABS:  (26 Mar 2020 01:48)        24HRS EVENT:  Extubated 4/12  Overnight: lasix 40 , UOP 2415cc total 24h 1020 overnight , cleviprex gtt @ 5, off insulin gtt at MN, on tight ISS      DVT Prophylaxis: clopidogrel Tablet 75 milliGRAM(s) Oral daily  heparin  Infusion 700 Unit(s)/Hr IV Continuous <Continuous>      GI Prophylaxis:bisacodyl Suppository 10 milliGRAM(s) Rectal daily  famotidine    Tablet 20 milliGRAM(s) Oral daily  lactulose Syrup 10 Gram(s) Oral three times a day  senna 2 Tablet(s) Oral at bedtime      Urinary Catheter		Indication: Strict I&O    Date Placed:   Urinary Catheter Indwelling Urethral Catheter:     Connect To:  Straight Drainage/Gravity    Indication:  Urine Output Monitoring in Critically Ill (04-12)  Indwelling Urethral Catheter:     Connect To:  Straight Drainage/Gravity    Indication:  Urine Output Monitoring in Critically Ill (04-13)        Diet, NPO (04-12-20 @ 18:57)      CURRENT MEDS:  Neurologic Medications  acetaminophen   Tablet .. 650 milliGRAM(s) Oral every 6 hours PRN Temp greater or equal to 38C (100.4F)    Respiratory Medications    Cardiovascular Medications  clevidipine Infusion 1 mG/Hr IV Continuous <Continuous>    Gastrointestinal Medications  bisacodyl Suppository 10 milliGRAM(s) Rectal daily  famotidine    Tablet 20 milliGRAM(s) Oral daily  lactulose Syrup 10 Gram(s) Oral three times a day  senna 2 Tablet(s) Oral at bedtime  sodium bicarbonate 325 milliGRAM(s) Oral two times a day    Genitourinary Medications    Hematologic/Oncologic Medications  clopidogrel Tablet 75 milliGRAM(s) Oral daily  heparin  Infusion 700 Unit(s)/Hr IV Continuous <Continuous>    Antimicrobial/Immunologic Medications    Endocrine/Metabolic Medications  dextrose 50% Injectable 50 milliLiter(s) IV Push every 15 minutes  insulin regular  human corrective regimen sliding scale   IV Push every 6 hours    Topical/Other Medications  chlorhexidine 0.12% Liquid 15 milliLiter(s) Oral Mucosa every 12 hours  chlorhexidine 4% Liquid 1 Application(s) Topical <User Schedule>      ICU Vital Signs Last 24 Hrs  T(C): 37.2 (13 Apr 2020 04:00), Max: 37.5 (12 Apr 2020 20:00)  T(F): 99 (13 Apr 2020 04:00), Max: 99.5 (12 Apr 2020 20:00)  HR: 85 (13 Apr 2020 04:00) (52 - 92)  BP: --  BP(mean): --  ABP: 168/60 (13 Apr 2020 04:00) (137/49 - 175/65)  ABP(mean): 95 (13 Apr 2020 04:00) (79 - 195)  RR: 16 (13 Apr 2020 04:00) (16 - 27)  SpO2: 98% (13 Apr 2020 04:00) (97% - 100%)      Adult Advanced Hemodynamics Last 24 Hrs  CVP(mm Hg): --  CVP(cm H2O): --  CO: --  CI: --  PA: --  PA(mean): --  PCWP: --  SVR: --  SVRI: --  PVR: --  PVRI: --      ABG - ( 13 Apr 2020 05:06 )  pH, Arterial: 7.43  pH, Blood: x     /  pCO2: 47    /  pO2: 88    / HCO3: 31    / Base Excess: 5.0   /  SaO2: 96            I&O's Summary    12 Apr 2020 07:01  -  13 Apr 2020 07:00  --------------------------------------------------------  IN: 510.2 mL / OUT: 2415 mL / NET: -1904.8 mL      I&O's Detail    12 Apr 2020 07:01  -  13 Apr 2020 07:00  --------------------------------------------------------  IN:    clevidipine Infusion: 113.4 mL    dexmedetomidine Infusion: 71.8 mL    heparin  Infusion.: 16.5 mL    heparin  Infusion.: 45.5 mL    heparin Infusion: 77 mL    insulin regular Infusion: 26 mL    Peptamen A.F.: 160 mL  Total IN: 510.2 mL    OUT:    Indwelling Catheter - Urethral: 2415 mL  Total OUT: 2415 mL    Total NET: -1904.8 mL      PHYSICAL EXAM:    General/Neuro     GCS: 15    = E   / V   / M      Deficits: alert & oriented x 3, no focal deficits    Lungs:      Normal expansion/effort.     Cardiovascular :  Regular rate and rhythm.  Peripheral edema   Cardiac Rhythm: Normal Sinus Rhythm    GI: Abdomen soft, Non-tender, Non-distended.      Extremities: Extremities warm, pink, well-perfused.     Derm: Good skin turgor, no skin breakdown.      :       Earl catheter in place.    LABS:  CAPILLARY BLOOD GLUCOSE      POCT Blood Glucose.: 155 mg/dL (13 Apr 2020 04:18)  POCT Blood Glucose.: 117 mg/dL (12 Apr 2020 23:45)  POCT Blood Glucose.: 122 mg/dL (12 Apr 2020 20:15)  POCT Blood Glucose.: 120 mg/dL (12 Apr 2020 19:35)  POCT Blood Glucose.: 137 mg/dL (12 Apr 2020 18:09)  POCT Blood Glucose.: 153 mg/dL (12 Apr 2020 16:04)  POCT Blood Glucose.: 153 mg/dL (12 Apr 2020 14:12)  POCT Blood Glucose.: 224 mg/dL (12 Apr 2020 12:09)  POCT Blood Glucose.: 190 mg/dL (12 Apr 2020 10:00)                          11.0   12.46 )-----------( 251      ( 12 Apr 2020 23:00 )             34.0       04-12    148<H>  |  107  |  55<H>  ----------------------------<  127<H>  4.7   |  30  |  1.7<H>    Ca    8.2<L>      12 Apr 2020 23:00  Phos  3.5     04-12  Mg     2.8     04-12    TPro  5.8<L>  /  Alb  2.9<L>  /  TBili  0.9  /  DBili  x   /  AST  29  /  ALT  42<H>  /  AlkPhos  109  04-12      PT/INR - ( 12 Apr 2020 23:00 )   PT: 11.90 sec;   INR: 1.03 ratio         PTT - ( 13 Apr 2020 04:31 )  PTT:61.9 sec  CARDIAC MARKERS ( 12 Apr 2020 23:00 )  x     / x     / 90 U/L / x     / x

## 2020-04-13 NOTE — PROGRESS NOTE ADULT - ASSESSMENT
73 y/o M with KEEGAN on CKD 3/4 in hospital for hypoxemia, cough, SOB.  PMH CAD s/p PCI, Kidney stones, DLD, HTN, CKD 3/4  COVId +. Pextubated yesterday. now on nasal cannula.    # KEEGAN on CKD 3/4:   - serum creatinine stable   - non-oliguric   - hold po bicarb. elevated bicarb level noted.   - BP at goal, keep current   - Hb at goal.   - strict I/O   - trend creatinine   - avoid nephrotoxins and hypotension   - COVID POSITIVE. pulmonary function improved. s/p extubation yesterday.

## 2020-04-13 NOTE — PROGRESS NOTE ADULT - SUBJECTIVE AND OBJECTIVE BOX
Nephrology progress note    Patient is seen, events over the last 24 h noted .  labs reviewed.    Allergies:  No Known Allergies    Hospital Medications:   MEDICATIONS  (STANDING):  aspirin  chewable 81 milliGRAM(s) Oral daily  atorvastatin 20 milliGRAM(s) Oral at bedtime  bisacodyl Suppository 10 milliGRAM(s) Rectal daily  chlorhexidine 0.12% Liquid 15 milliLiter(s) Oral Mucosa every 12 hours  chlorhexidine 4% Liquid 1 Application(s) Topical <User Schedule>  clevidipine Infusion 1 mG/Hr (2 mL/Hr) IV Continuous <Continuous>  clopidogrel Tablet 75 milliGRAM(s) Oral daily  dextrose 50% Injectable 50 milliLiter(s) IV Push every 15 minutes  famotidine    Tablet 20 milliGRAM(s) Oral daily  furosemide   Injectable 40 milliGRAM(s) IV Push once  heparin  Infusion 700 Unit(s)/Hr (7 mL/Hr) IV Continuous <Continuous>  insulin regular  human corrective regimen sliding scale   IV Push every 6 hours  lactulose Syrup 10 Gram(s) Oral three times a day  metoprolol tartrate 25 milliGRAM(s) Oral two times a day  senna 2 Tablet(s) Oral at bedtime  sodium bicarbonate 325 milliGRAM(s) Oral two times a day        VITALS:  T(F): 98.8 (04-13-20 @ 08:00), Max: 99.5 (04-12-20 @ 20:00)  HR: 92 (04-13-20 @ 09:00)  BP: 154/53  RR: 18 (04-13-20 @ 09:00)  SpO2: 99% (04-13-20 @ 09:00)  Wt(kg): --    04-11 @ 07:01  -  04-12 @ 07:00  --------------------------------------------------------  IN: 386.9 mL / OUT: 1710 mL / NET: -1323.1 mL    04-12 @ 07:01  -  04-13 @ 07:00  --------------------------------------------------------  IN: 510.2 mL / OUT: 2415 mL / NET: -1904.8 mL    04-13 @ 07:01  -  04-13 @ 12:15  --------------------------------------------------------  IN: 0 mL / OUT: 100 mL / NET: -100 mL          PHYSICAL EXAM:  Constitutional: NAD  HEENT: anicteric sclera, oropharynx clear, MMM  Neck: No JVD  Respiratory: CTAB, no wheezes, rales or rhonchi  Cardiovascular: S1, S2, RRR  Gastrointestinal: BS+, soft, NT/ND  Extremities: No cyanosis or clubbing. No peripheral edema  :  No sosa.   Skin: No rashes    LABS:  04-12    148<H>  |  107  |  55<H>  ----------------------------<  127<H>  4.7   |  30  |  1.7<H>    Ca    8.2<L>      12 Apr 2020 23:00  Phos  3.5     04-12  Mg     2.8     04-12    TPro  5.8<L>  /  Alb  2.9<L>  /  TBili  0.9  /  DBili      /  AST  29  /  ALT  42<H>  /  AlkPhos  109  04-12                          11.0   12.46 )-----------( 251      ( 12 Apr 2020 23:00 )             34.0     Blood Gas Profile - Arterial in AM (04.13.20 @ 05:06)    pH, Arterial: 7.43    pCO2, Arterial: 47 mmHg    pO2, Arterial: 88 mmHg    HCO3, Arterial: 31 mmoL/L    Base Excess, Arterial: 5.0 mmoL/L    Oxygen Saturation, Arterial: 96 %    Urine Studies:      RADIOLOGY & ADDITIONAL STUDIES:

## 2020-04-13 NOTE — PROGRESS NOTE ADULT - SUBJECTIVE AND OBJECTIVE BOX
KEZIA MOYER  74y, Male  Allergy: No Known Allergies      CHIEF COMPLAINT: COVID-19 Pneumonia (10 Apr 2020 09:36)      INTERVAL EVENTS/HPI  - No acute events overnight  - T(F): , Max: 99.5 (04-12-20 @ 20:00)  - Denies any worsening symptoms  - Tolerating medication  - WBC Count: 12.46 K/uL (04-12-20 @ 23:00)      ROS  General: Denies fevers, chills, nightsweats, weight loss  HEENT: Denies headache, rhinorrhea, sore throat, eye pain  CV: Denies CP, palpitations  PULM: Denies SOB, cough  GI: Denies abdominal pain, diarrhea  : Denies dysuria, hematuria  MSK: Denies arthralgias  SKIN: Denies rash   NEURO: Denies paresthesias, weakness  PSYCH: Denies depression    FH non-contributory   Social Hx non-contributory    VITALS:  T(F): 98.8, Max: 99.5 (04-12-20 @ 20:00)  HR: 92  BP: --  RR: 18Vital Signs Last 24 Hrs  T(C): 37.1 (13 Apr 2020 08:00), Max: 37.5 (12 Apr 2020 20:00)  T(F): 98.8 (13 Apr 2020 08:00), Max: 99.5 (12 Apr 2020 20:00)  HR: 92 (13 Apr 2020 09:00) (52 - 97)  BP: --  BP(mean): --  RR: 18 (13 Apr 2020 09:00) (16 - 34)  SpO2: 99% (13 Apr 2020 09:00) (97% - 100%)    PHYSICAL EXAM:  Gen: NAD, resting in bed  HEENT: Normocephalic, atraumatic  Neck: supple, no lymphadenopathy  CV: Regular rate & regular rhythm  Lungs: decreased BS at bases, no fremitus  Abdomen: Soft, BS present  Ext: Warm, well perfused  Neuro: non focal, awake  Skin: no rash, no erythema      TESTS & MEASUREMENTS:                        11.0   12.46 )-----------( 251      ( 12 Apr 2020 23:00 )             34.0     04-12    148<H>  |  107  |  55<H>  ----------------------------<  127<H>  4.7   |  30  |  1.7<H>    Ca    8.2<L>      12 Apr 2020 23:00  Phos  3.5     04-12  Mg     2.8     04-12    TPro  5.8<L>  /  Alb  2.9<L>  /  TBili  0.9  /  DBili  x   /  AST  29  /  ALT  42<H>  /  AlkPhos  109  04-12    eGFR if Non African American: 39 mL/min/1.73M2 (04-12-20 @ 23:00)  eGFR if : 45 mL/min/1.73M2 (04-12-20 @ 23:00)    LIVER FUNCTIONS - ( 12 Apr 2020 23:00 )  Alb: 2.9 g/dL / Pro: 5.8 g/dL / ALK PHOS: 109 U/L / ALT: 42 U/L / AST: 29 U/L / GGT: x                 Lactate, Blood: 1.9 mmol/L (04-10-20 @ 23:00)      INFECTIOUS DISEASES TESTING      RADIOLOGY & ADDITIONAL TESTS:  I have personally reviewed the last Chest xray  CXR      CT      CARDIOLOGY TESTING  12 Lead ECG:   Systolic  mmHg    Diastolic BP 57 mmHg    Ventricular Rate 74 BPM    Atrial Rate 74 BPM    P-R Interval 144 ms    QRS Duration 84 ms    Q-T Interval 386 ms    QTC Calculation(Bezet) 428 ms    P Axis 67 degrees    R Axis 58 degrees    T Axis 65 degrees    Diagnosis Line Sinus rhythm with occasional Premature ventricular complexes  Lateral infarct , age undetermined  Technically poor tracing  V3 and V6 have severe artifact  Abnormal ECG    Confirmed by AD GONZALEZ MD [id number]) on 4/10/2020 10:13:47 PM (04-10-20 @ 18:54)  12 Lead ECG:   Ventricular Rate 69 BPM    Atrial Rate 69 BPM    P-R Interval 146 ms    QRS Duration 86 ms    Q-T Interval 500 ms    QTC Calculation(Bezet) 535 ms    P Axis 52 degrees    R Axis 63 degrees    T Axis 69 degrees    Diagnosis Line Normal sinus rhythm  Increased R/S ratio in V1, consider early transition or posterior infarct  Prolonged QT  Abnormal ECG    Confirmed by Scooter Ortega ([id number]) on 4/8/2020 11:30:53 AM (04-08-20 @ 10:05)      MEDICATIONS  bisacodyl Suppository 10  chlorhexidine 0.12% Liquid 15  chlorhexidine 4% Liquid 1  clevidipine Infusion 1  clopidogrel Tablet 75  dextrose 50% Injectable 50  famotidine    Tablet 20  heparin  Infusion 700  insulin regular  human corrective regimen sliding scale   lactulose Syrup 10  senna 2  sodium bicarbonate 325      ANTIBIOTICS:      All available historical data has been reviewed KEZIA MOYER  74y, Male  Allergy: No Known Allergies      CHIEF COMPLAINT: COVID-19 Pneumonia (10 Apr 2020 09:36)      INTERVAL EVENTS/HPI  - No acute events overnight  - T(F): , Max: 99.5 (04-12-20 @ 20:00)  - Denies any worsening symptoms  - Tolerating medication  - WBC Count: 12.46 K/uL (04-12-20 @ 23:00)      ROS  unable to obtain history secondary to patient's mental status and/or sedation     FH non-contributory   Social Hx non-contributory    VITALS:  T(F): 98.8, Max: 99.5 (04-12-20 @ 20:00)  HR: 92  BP: --  RR: 18Vital Signs Last 24 Hrs  T(C): 37.1 (13 Apr 2020 08:00), Max: 37.5 (12 Apr 2020 20:00)  T(F): 98.8 (13 Apr 2020 08:00), Max: 99.5 (12 Apr 2020 20:00)  HR: 92 (13 Apr 2020 09:00) (52 - 97)  BP: --  BP(mean): --  RR: 18 (13 Apr 2020 09:00) (16 - 34)  SpO2: 99% (13 Apr 2020 09:00) (97% - 100%)    PHYSICAL EXAM:  see below       TESTS & MEASUREMENTS:                        11.0   12.46 )-----------( 251      ( 12 Apr 2020 23:00 )             34.0     04-12    148<H>  |  107  |  55<H>  ----------------------------<  127<H>  4.7   |  30  |  1.7<H>    Ca    8.2<L>      12 Apr 2020 23:00  Phos  3.5     04-12  Mg     2.8     04-12    TPro  5.8<L>  /  Alb  2.9<L>  /  TBili  0.9  /  DBili  x   /  AST  29  /  ALT  42<H>  /  AlkPhos  109  04-12    eGFR if Non African American: 39 mL/min/1.73M2 (04-12-20 @ 23:00)  eGFR if : 45 mL/min/1.73M2 (04-12-20 @ 23:00)    LIVER FUNCTIONS - ( 12 Apr 2020 23:00 )  Alb: 2.9 g/dL / Pro: 5.8 g/dL / ALK PHOS: 109 U/L / ALT: 42 U/L / AST: 29 U/L / GGT: x                 Lactate, Blood: 1.9 mmol/L (04-10-20 @ 23:00)      INFECTIOUS DISEASES TESTING      RADIOLOGY & ADDITIONAL TESTS:  I have personally reviewed the last Chest xray  CXR      CT      CARDIOLOGY TESTING  12 Lead ECG:   Systolic  mmHg    Diastolic BP 57 mmHg    Ventricular Rate 74 BPM    Atrial Rate 74 BPM    P-R Interval 144 ms    QRS Duration 84 ms    Q-T Interval 386 ms    QTC Calculation(Bezet) 428 ms    P Axis 67 degrees    R Axis 58 degrees    T Axis 65 degrees    Diagnosis Line Sinus rhythm with occasional Premature ventricular complexes  Lateral infarct , age undetermined  Technically poor tracing  V3 and V6 have severe artifact  Abnormal ECG    Confirmed by AD GONZALEZ MD [id number]) on 4/10/2020 10:13:47 PM (04-10-20 @ 18:54)  12 Lead ECG:   Ventricular Rate 69 BPM    Atrial Rate 69 BPM    P-R Interval 146 ms    QRS Duration 86 ms    Q-T Interval 500 ms    QTC Calculation(Bezet) 535 ms    P Axis 52 degrees    R Axis 63 degrees    T Axis 69 degrees    Diagnosis Line Normal sinus rhythm  Increased R/S ratio in V1, consider early transition or posterior infarct  Prolonged QT  Abnormal ECG    Confirmed by Scooter Ortega ([id number]) on 4/8/2020 11:30:53 AM (04-08-20 @ 10:05)      MEDICATIONS  bisacodyl Suppository 10  chlorhexidine 0.12% Liquid 15  chlorhexidine 4% Liquid 1  clevidipine Infusion 1  clopidogrel Tablet 75  dextrose 50% Injectable 50  famotidine    Tablet 20  heparin  Infusion 700  insulin regular  human corrective regimen sliding scale   lactulose Syrup 10  senna 2  sodium bicarbonate 325      ANTIBIOTICS:      All available historical data has been reviewed

## 2020-04-13 NOTE — PROGRESS NOTE ADULT - REASON FOR ADMISSION
COVID
COVID 19
COVID+
COVID19
cough for 1 wk
SOB
COVID-19 Pneumonia

## 2020-04-13 NOTE — CHART NOTE - NSCHARTNOTEFT_GEN_A_CORE
Attended rounds  Previously   Called Dr. Davila PCP [phone] - discussed in great detail  Called Matthew Orellana [phone] - discussed in great detail  Called Matthew Arellano [phone] - left a voice mail ( he prefers to leave a voice mail as he cant  the phone during passover)    On 4/13 : Called Matthew Orellana [phone] () - discussed in great detail      Updated on the ventilator requirement - CXR shows mild improvemetn  Updated on reducing sedation from morphine/versed to just versed  Updated on insulin drip.   Updated on changing heparin to argatroban drip. Attended rounds  Previously   Called Dr. Davila PCP [phone] - discussed in great detail  Called Matthew Orellana [phone] () - discussed in great detail  Called Matthew Arellano [phone] - left a voice mail ( he prefers to leave a voice mail as he cant  the phone during passover)    On 4/13 : Called Matthew Orellana [phone] () - discussed in great detail      Updated on the extubation  Updated on will try to get him oob, speech and swallow - give oral medications.

## 2020-04-13 NOTE — PHARMACOTHERAPY INTERVENTION NOTE - COMMENTS
Approved by Dr Yin
recommended to md duplication of therapy. md confirmed pt is to be on both meds
As per Dr Ayala

## 2020-04-13 NOTE — CHART NOTE - NSCHARTNOTEFT_GEN_A_CORE
Registered Dietitian Follow-Up     Patient Profile Reviewed                           Yes [x]   No []     Nutrition History Previously Obtained        Yes [x]  No []       Pertinent Medical Interventions: Pt s/p extubation on 4/12, saturating well on NC; possible transfer to floor later today.    Diet order: CHO Consistent/no snacks--diet recently advanced, was previously NPO d/t extubation awaiting SLP eval.      Anthropometrics:  - Ht. 67"   - Wt. 98.6 (4/11)--fluctuating wts likely 2/2 fluids (pt noted to be on diuretic therapy)   95kg (4/10)  96.6kg (4/7)  97.8kg (4/2) dosing  - BMI: 32.8 using wt of 95kg   - IBW: 148#      Pertinent Lab Data: (4/12): H/H 11/0/34.0, POCT 290, Na 148, BUN 55, Cr. 1.7, Gluc 127, Mg. 2.8, GFR 39      Pertinent Meds: eliquis, plavix, aspirin, insulin, lasix, amlodipine, lipitor, cardizem, lopressor, dulcolax, lactulose, senna, pepcid, sodium bicarbonate     Physical Findings:  - Appearance: alert, disoriented; 1+ generalized edema noted   - GI function: LBM 4/13  - Oral/Mouth cavity: per SLP recs on 3/13--regular consistency diet w/ thin  - Skin: intact      Nutrition Requirements  Weight Used: IBW: 148#/67kg; needs readjusted today s/p extubation      Estimated Energy Needs: 2756-2042 kcal/day (25-30 kcal/kg IBW)--obese BMI considered   Estimated Protein Needs: 67-80 gm/day (1-1.2 gm/kg IBW)--same as above   Estimated Fluid Needs: per LIP      Nutrient Intake: not meeting kcal/pro needs d/t recent diet advancement      Previous Nutrition Diagnosis: 1. Excessive energy intake (N/A), 2.  Inadequate protein intake--adjusted Inadequate protein-energy intake (ongoing)      Nutrition Intervention: meals and snacks, medical food supplements    Recommendations:   1. Add DASH/TLC diet modifier as well as Kosher modifier per pt request per RD initial assessment  2. Order Glucerna BID     **All recs discussed with SICU team ([phone])**    Goal/Expected Outcome: Pt to consume >50% po intake of meals, snacks, and supplements within 3 days      Indicator/Monitoring: RD to monitor diet order, energy intake, renal/glucose profiles, body composition, nutrition focused physical findings

## 2020-04-13 NOTE — PROGRESS NOTE ADULT - ASSESSMENT
Sepsis with SIRS due to COVID 19 PNA, intubated 4/7    CXR with diffuse bilateral opacities , white out-- now decreased on L     Procalcitonin, Serum: 0.18 (04-10-20 @ 23:00)    #Cytokine Release Syndrome   D-Dimer Assay, Quantitative: 2940 ng/mL DDU (04-10-20 @ 23:00)  D-Dimer Assay, Quantitative: 3588 ng/mL DDU (04-10-20 @ 00:58)  eGFR if Non African American: 39 mL/min/1.73M2 (04-12-20 @ 23:00)  BMI 32.8 (04-09-20 @ 22:15)  On heparin    C-Reactive Protein, Serum: 0.88 mg/dL (04-10-20 @ 23:00)  Ferritin, Serum: 977 ng/mL (04-10-20 @ 23:00)    - s/p azithro/plaqeunil/ Anakinra 100mg SC q6h x 3 days     #hyponatremia    WBC downtrending     RECOMMENDATIONS:  Observe off antibiotics   - If fever, repeat procalcitonin

## 2020-04-13 NOTE — SWALLOW BEDSIDE ASSESSMENT ADULT - SWALLOW EVAL: DIAGNOSIS
+eyal-pharyngeal swallow is WFL for thin, puree and regular solids w/o overt s/s of penetration/aspiration

## 2020-04-13 NOTE — PROGRESS NOTE ADULT - ASSESSMENT
Neurologic:   extubated, off of precedex, following commands, speaking, AAOx3    Respiratory:   acute hypoxic respiratory failure due to COVID PNA s/p intubation   -Extubated 12  AM AB.43/47/88/31 --> this is essentially on RA, keeps pulling off mask satting 92-99 AM CXR     Cardiovascular:   Acute HTN- on Cleviprex  @6 ~ 180-190s SBP  -on Plavix for h/o PCI/TEMI  -holding BB and Cardizem due to bradycardia -HR 50s now HR in 90s  - A fib on heparin   -Troponin 0.04, stable     Gastrointestinal/Nutrition:  NPO, awaiting S&S eval today (will need to be off NRB for them to eval)  PPI and Senna, lactulose, monitor for BM (none for 5 days); started on dulcolax GA   Triglycerides 102>122 >148    Genitourinary/Renal:   KEEGAN on CKD resolving, non-oliguric (peak Cr 4.2)  Cr 1.8 -> 1.7   BUN 68 -> 52  sheila UOP- /hr   Na 148  K 4.7  Mg 2.8  Phos 3.5      Hematologic:   on Hep gtt 700 units/hr, last PTT 61.9, f/u 11am   Hgb stable 11.1->11  Plts 251    Infectious Disease:   COVID-19 PNA - completed course of Plaquenil/Azithromycin and Anakinra x 3days  -procalcitonin 0.14-> 0.18 4/10   -WBC 12.4  -Afebrile     Endocrine:   Hyperglycemia- on ISS  FS    HbA1C - 6.8    DVT PTX: Heparin gtt  GI PTX: Pepcid    ***Tubes/Lines/Drains  ***  Peripheral IV  Central Venous Line   left IJ  	Date -> new R IJ TLC  as left not secured, migrated   Arterial Line    right radial		                Date     Urinary Catheter		Indication: Strict I&O    Date Placed:     REVIEW OF SYSTEMS    [ ] A ten-point review of systems was otherwise negative except as noted.  [X ] Due to altered mental status/intubation, subjective information were not able to be obtained from the patient. History was obtained, to the extent possible, from review of the chart and collateral sources of information.

## 2020-04-14 PROBLEM — Z00.00 ENCOUNTER FOR PREVENTIVE HEALTH EXAMINATION: Status: ACTIVE | Noted: 2020-04-14

## 2020-04-14 LAB
ALBUMIN SERPL ELPH-MCNC: 2.7 G/DL — LOW (ref 3.5–5.2)
ALP SERPL-CCNC: 112 U/L — SIGNIFICANT CHANGE UP (ref 30–115)
ALT FLD-CCNC: 44 U/L — HIGH (ref 0–41)
ANION GAP SERPL CALC-SCNC: 11 MMOL/L — SIGNIFICANT CHANGE UP (ref 7–14)
ANION GAP SERPL CALC-SCNC: 15 MMOL/L — HIGH (ref 7–14)
APTT BLD: 30.8 SEC — SIGNIFICANT CHANGE UP (ref 27–39.2)
AST SERPL-CCNC: 31 U/L — SIGNIFICANT CHANGE UP (ref 0–41)
BASE EXCESS BLDA CALC-SCNC: 8.5 MMOL/L — HIGH (ref -2–2)
BASOPHILS # BLD AUTO: 0.01 K/UL — SIGNIFICANT CHANGE UP (ref 0–0.2)
BASOPHILS NFR BLD AUTO: 0.1 % — SIGNIFICANT CHANGE UP (ref 0–1)
BILIRUB SERPL-MCNC: 0.7 MG/DL — SIGNIFICANT CHANGE UP (ref 0.2–1.2)
BUN SERPL-MCNC: 46 MG/DL — HIGH (ref 10–20)
BUN SERPL-MCNC: 52 MG/DL — HIGH (ref 10–20)
CA-I BLD-SCNC: 1.13 MMOL/L — SIGNIFICANT CHANGE UP (ref 1.12–1.3)
CALCIUM SERPL-MCNC: 7.7 MG/DL — LOW (ref 8.5–10.1)
CALCIUM SERPL-MCNC: 7.9 MG/DL — LOW (ref 8.5–10.1)
CHLORIDE SERPL-SCNC: 100 MMOL/L — SIGNIFICANT CHANGE UP (ref 98–110)
CHLORIDE SERPL-SCNC: 105 MMOL/L — SIGNIFICANT CHANGE UP (ref 98–110)
CK SERPL-CCNC: 104 U/L — SIGNIFICANT CHANGE UP (ref 0–225)
CO2 SERPL-SCNC: 28 MMOL/L — SIGNIFICANT CHANGE UP (ref 17–32)
CO2 SERPL-SCNC: 29 MMOL/L — SIGNIFICANT CHANGE UP (ref 17–32)
CREAT SERPL-MCNC: 1.7 MG/DL — HIGH (ref 0.7–1.5)
CREAT SERPL-MCNC: 1.8 MG/DL — HIGH (ref 0.7–1.5)
CRP SERPL-MCNC: 12.53 MG/DL — HIGH (ref 0–0.4)
CRP SERPL-MCNC: 14.1 MG/DL — HIGH (ref 0–0.4)
D DIMER BLD IA.RAPID-MCNC: 552 NG/ML DDU — HIGH (ref 0–230)
EOSINOPHIL # BLD AUTO: 0.08 K/UL — SIGNIFICANT CHANGE UP (ref 0–0.7)
EOSINOPHIL NFR BLD AUTO: 0.8 % — SIGNIFICANT CHANGE UP (ref 0–8)
FERRITIN SERPL-MCNC: 1088 NG/ML — HIGH (ref 30–400)
FERRITIN SERPL-MCNC: 1188 NG/ML — HIGH (ref 30–400)
GAS PNL BLDA: SIGNIFICANT CHANGE UP
GLUCOSE BLDC GLUCOMTR-MCNC: 135 MG/DL — HIGH (ref 70–99)
GLUCOSE BLDC GLUCOMTR-MCNC: 142 MG/DL — HIGH (ref 70–99)
GLUCOSE BLDC GLUCOMTR-MCNC: 156 MG/DL — HIGH (ref 70–99)
GLUCOSE BLDC GLUCOMTR-MCNC: 188 MG/DL — HIGH (ref 70–99)
GLUCOSE BLDC GLUCOMTR-MCNC: 257 MG/DL — HIGH (ref 70–99)
GLUCOSE BLDC GLUCOMTR-MCNC: 433 MG/DL — HIGH (ref 70–99)
GLUCOSE BLDC GLUCOMTR-MCNC: 95 MG/DL — SIGNIFICANT CHANGE UP (ref 70–99)
GLUCOSE BLDC GLUCOMTR-MCNC: 97 MG/DL — SIGNIFICANT CHANGE UP (ref 70–99)
GLUCOSE SERPL-MCNC: 187 MG/DL — HIGH (ref 70–99)
GLUCOSE SERPL-MCNC: 227 MG/DL — HIGH (ref 70–99)
HCO3 BLDA-SCNC: 33 MMOL/L — HIGH (ref 21–29)
HCT VFR BLD CALC: 31.4 % — LOW (ref 42–52)
HGB BLD-MCNC: 9.6 G/DL — LOW (ref 14–18)
HOROWITZ INDEX BLDA+IHG-RTO: 21 — SIGNIFICANT CHANGE UP
IMM GRANULOCYTES NFR BLD AUTO: 1.6 % — HIGH (ref 0.1–0.3)
INR BLD: 1.29 RATIO — SIGNIFICANT CHANGE UP (ref 0.65–1.3)
LACTATE SERPL-SCNC: 2.6 MMOL/L — HIGH (ref 0.7–2)
LDH SERPL L TO P-CCNC: 410 U/L — HIGH (ref 50–242)
LYMPHOCYTES # BLD AUTO: 0.95 K/UL — LOW (ref 1.2–3.4)
LYMPHOCYTES # BLD AUTO: 8.9 % — LOW (ref 20.5–51.1)
MAGNESIUM SERPL-MCNC: 2.1 MG/DL — SIGNIFICANT CHANGE UP (ref 1.8–2.4)
MAGNESIUM SERPL-MCNC: 2.6 MG/DL — HIGH (ref 1.8–2.4)
MCHC RBC-ENTMCNC: 28.8 PG — SIGNIFICANT CHANGE UP (ref 27–31)
MCHC RBC-ENTMCNC: 30.6 G/DL — LOW (ref 32–37)
MCV RBC AUTO: 94.3 FL — HIGH (ref 80–94)
MONOCYTES # BLD AUTO: 0.75 K/UL — HIGH (ref 0.1–0.6)
MONOCYTES NFR BLD AUTO: 7 % — SIGNIFICANT CHANGE UP (ref 1.7–9.3)
NEUTROPHILS # BLD AUTO: 8.7 K/UL — HIGH (ref 1.4–6.5)
NEUTROPHILS NFR BLD AUTO: 81.6 % — HIGH (ref 42.2–75.2)
NRBC # BLD: 0 /100 WBCS — SIGNIFICANT CHANGE UP (ref 0–0)
PCO2 BLDA: 45 MMHG — HIGH (ref 38–42)
PH BLDA: 7.47 — HIGH (ref 7.38–7.42)
PHOSPHATE SERPL-MCNC: 3.5 MG/DL — SIGNIFICANT CHANGE UP (ref 2.1–4.9)
PHOSPHATE SERPL-MCNC: 3.7 MG/DL — SIGNIFICANT CHANGE UP (ref 2.1–4.9)
PLATELET # BLD AUTO: 200 K/UL — SIGNIFICANT CHANGE UP (ref 130–400)
PO2 BLDA: 62 MMHG — LOW (ref 78–95)
POTASSIUM SERPL-MCNC: 4.5 MMOL/L — SIGNIFICANT CHANGE UP (ref 3.5–5)
POTASSIUM SERPL-MCNC: 4.6 MMOL/L — SIGNIFICANT CHANGE UP (ref 3.5–5)
POTASSIUM SERPL-SCNC: 4.5 MMOL/L — SIGNIFICANT CHANGE UP (ref 3.5–5)
POTASSIUM SERPL-SCNC: 4.6 MMOL/L — SIGNIFICANT CHANGE UP (ref 3.5–5)
PROT SERPL-MCNC: 5.4 G/DL — LOW (ref 6–8)
PROTHROM AB SERPL-ACNC: 14.8 SEC — HIGH (ref 9.95–12.87)
RBC # BLD: 3.33 M/UL — LOW (ref 4.7–6.1)
RBC # FLD: 15.3 % — HIGH (ref 11.5–14.5)
SAO2 % BLDA: 92 % — SIGNIFICANT CHANGE UP (ref 92–96)
SODIUM SERPL-SCNC: 140 MMOL/L — SIGNIFICANT CHANGE UP (ref 135–146)
SODIUM SERPL-SCNC: 148 MMOL/L — HIGH (ref 135–146)
TRIGL SERPL-MCNC: 102 MG/DL — SIGNIFICANT CHANGE UP (ref 10–149)
WBC # BLD: 10.66 K/UL — SIGNIFICANT CHANGE UP (ref 4.8–10.8)
WBC # FLD AUTO: 10.66 K/UL — SIGNIFICANT CHANGE UP (ref 4.8–10.8)

## 2020-04-14 PROCEDURE — 71045 X-RAY EXAM CHEST 1 VIEW: CPT | Mod: 26

## 2020-04-14 PROCEDURE — 99291 CRITICAL CARE FIRST HOUR: CPT | Mod: CS

## 2020-04-14 RX ORDER — INSULIN HUMAN 100 [IU]/ML
INJECTION, SOLUTION SUBCUTANEOUS EVERY 6 HOURS
Refills: 0 | Status: DISCONTINUED | OUTPATIENT
Start: 2020-04-14 | End: 2020-04-14

## 2020-04-14 RX ORDER — AMLODIPINE BESYLATE 2.5 MG/1
5 TABLET ORAL ONCE
Refills: 0 | Status: COMPLETED | OUTPATIENT
Start: 2020-04-14 | End: 2020-04-14

## 2020-04-14 RX ORDER — INSULIN HUMAN 100 [IU]/ML
INJECTION, SOLUTION SUBCUTANEOUS
Refills: 0 | Status: DISCONTINUED | OUTPATIENT
Start: 2020-04-14 | End: 2020-04-16

## 2020-04-14 RX ORDER — AMLODIPINE BESYLATE 2.5 MG/1
10 TABLET ORAL DAILY
Refills: 0 | Status: DISCONTINUED | OUTPATIENT
Start: 2020-04-15 | End: 2020-04-15

## 2020-04-14 RX ADMIN — APIXABAN 5 MILLIGRAM(S): 2.5 TABLET, FILM COATED ORAL at 14:17

## 2020-04-14 RX ADMIN — Medication 25 MILLIGRAM(S): at 05:59

## 2020-04-14 RX ADMIN — Medication 25 MILLIGRAM(S): at 14:18

## 2020-04-14 RX ADMIN — LACTULOSE 10 GRAM(S): 10 SOLUTION ORAL at 05:59

## 2020-04-14 RX ADMIN — CHLORHEXIDINE GLUCONATE 15 MILLILITER(S): 213 SOLUTION TOPICAL at 18:03

## 2020-04-14 RX ADMIN — INSULIN HUMAN 8: 100 INJECTION, SOLUTION SUBCUTANEOUS at 11:52

## 2020-04-14 RX ADMIN — FAMOTIDINE 20 MILLIGRAM(S): 10 INJECTION INTRAVENOUS at 11:36

## 2020-04-14 RX ADMIN — APIXABAN 5 MILLIGRAM(S): 2.5 TABLET, FILM COATED ORAL at 22:37

## 2020-04-14 RX ADMIN — Medication 40 MILLIGRAM(S): at 05:59

## 2020-04-14 RX ADMIN — ATORVASTATIN CALCIUM 20 MILLIGRAM(S): 80 TABLET, FILM COATED ORAL at 22:19

## 2020-04-14 RX ADMIN — AMLODIPINE BESYLATE 5 MILLIGRAM(S): 2.5 TABLET ORAL at 14:18

## 2020-04-14 RX ADMIN — Medication 120 MILLIGRAM(S): at 11:24

## 2020-04-14 RX ADMIN — Medication 2 MG/HR: at 22:19

## 2020-04-14 RX ADMIN — INSULIN HUMAN 4: 100 INJECTION, SOLUTION SUBCUTANEOUS at 22:18

## 2020-04-14 RX ADMIN — Medication 81 MILLIGRAM(S): at 11:38

## 2020-04-14 RX ADMIN — Medication 2 MG/HR: at 11:23

## 2020-04-14 RX ADMIN — CLOPIDOGREL BISULFATE 75 MILLIGRAM(S): 75 TABLET, FILM COATED ORAL at 11:36

## 2020-04-14 RX ADMIN — AMLODIPINE BESYLATE 5 MILLIGRAM(S): 2.5 TABLET ORAL at 05:59

## 2020-04-14 RX ADMIN — Medication 2 MG/HR: at 11:36

## 2020-04-14 RX ADMIN — Medication 325 MILLIGRAM(S): at 18:03

## 2020-04-14 RX ADMIN — APIXABAN 5 MILLIGRAM(S): 2.5 TABLET, FILM COATED ORAL at 01:33

## 2020-04-14 RX ADMIN — INSULIN HUMAN 2: 100 INJECTION, SOLUTION SUBCUTANEOUS at 08:28

## 2020-04-14 RX ADMIN — SENNA PLUS 2 TABLET(S): 8.6 TABLET ORAL at 22:19

## 2020-04-14 RX ADMIN — Medication 325 MILLIGRAM(S): at 06:00

## 2020-04-14 RX ADMIN — CHLORHEXIDINE GLUCONATE 1 APPLICATION(S): 213 SOLUTION TOPICAL at 06:01

## 2020-04-14 RX ADMIN — Medication 25 MILLIGRAM(S): at 22:19

## 2020-04-14 NOTE — PROGRESS NOTE ADULT - ASSESSMENT
Neurologic:   No sedation. Monitor for changes in mental status     Respiratory:   acute hypoxic respiratory failure due to COVID PNA s/p intubation 4/7  -Extubated 4/12  ABG BID     Cardiovascular:   Acute HTN- Cleviprex. SBP <150   -on Plavix/ASA for h/o PCI/TEMI  -Restarted Metoprolol 25 Q 8 and Cardizem PO   -Cardizem gtt DC'ed overnight   -Troponin 0.04, stable     Gastrointestinal/Nutrition:  Carb consistent diet   PPI and Senna, lactulose, ducloax. BM 4/13   Trend TG     Genitourinary/Renal:   Cr 1.8 -> 1.7   BUN 68 -> 52  No sosa. Pending TOV   Monitor lytes, replete as needed     Hematologic:   Elliquis x3 weeks for new onset A fib   monitor H/H   Monitor Platelets     Infectious Disease:   COVID-19 PNA - completed course of Plaquenil/Azithromycin and Anakinra x 3days  -procalcitonin 0.14-> 0.18 4/10   -Trend WBC   -Afebrile     Endocrine:   Hyperglycemia- on ISS  FS    HbA1C - 6.8      D/W Dr Rizvi

## 2020-04-14 NOTE — PROGRESS NOTE ADULT - SUBJECTIVE AND OBJECTIVE BOX
KEZIA MOYER  [id number]  74y Male    Indication for ICU admission: COVID PNA, intubated on , transferred from F9 to burn ICU for further treatment    Admit Date:20  ICU Date:       No Known Allergies    PAST MEDICAL & SURGICAL HISTORY:  CAD (coronary artery disease)  Kidney stones  High cholesterol  HTN (hypertension)  S/P drug eluting coronary stent placement: &gt; 10 years ago  CKD    Home Medications:  clopidogrel 75 mg oral tablet: 1 tab(s) orally once a day (30 Mar 2020 10:01)  FEBUXOSTAT  40 MG TABS:  (26 Mar 2020 01:49)  FUROSEMIDE  40 MG TABS:  (26 Mar 2020 01:49)  metoprolol tartrate 25 mg oral tablet: 1 tab(s) orally 2 times a day (30 Mar 2020 10:01)  PIOGLITAZONE HYDROCHLORIDE  30 MG TABS:  (26 Mar 2020 01:49)  ROSUVASTATIN CALCIUM  20 MG TABS:  (26 Mar 2020 01:48)      24HRS EVENT:  overnight: weaned NC to 2L, satting 95-96%, held cardizem gtt HR 50s @ 345am. Sosa DC @ [facility], pending TOV. Started insulin gtt, off since 4am.     Neurologic:   No sedation. Following commands. A&Ox3.     Respiratory:   acute hypoxic respiratory failure due to COVID PNA s/p intubation   -Extubated 4/12  AM AB.47/45/62/33 NC on 2L     Cardiovascular:   Acute HTN- on Cleviprex  @3    -on Plavix for h/o PCI/TEMI  -Metoprolol 25 Q 8  -Started Cardizem gtt DC @ 345am for HR 50s   -Lasix 40 PO daily   -Lasix 40 IV push and Lasix 40 PO yesterday  (- 1.1L )   -A fib: 's Lopressor 5 x1, Cardizem 10 x1, Cardizem gtt DC  -Troponin 0.04, stable     Gastrointestinal/Nutrition:  Passed speach/swallow  Carb consistent diet   PPI and Senna, lactulose, monitor for BM (none for 5 days); started on dulcolax [place]   Triglycerides 102>122 >148  1 BM     Genitourinary/Renal:   KEEGAN on CKD resolving, non-oliguric (peak Cr 4.2)  BUN/Cr  52/1.8 stable  DC sheila at [facility], pending TOV**  Na 148  K 4.5  Mg 2.6  Phos 3.7   Lasix 40 IVP  total UOP 1220, NET  - 1.1L    Hematologic:   Elliquis 5 BID x3 weeks.   Restarted ASA. Continue Plavix.   Hgb downtrending 11>10.4>9.6  Plts 200  dD 552    Infectious Disease:   COVID-19 PNA - completed course of Plaquenil/Azithromycin and Anakinra x 3days  -procalcitonin 0.14-> 0.18--> 0.27    -WBC 14>10.6  -Afebrile     Endocrine:   Hyperglycemia- on ISS (FS >200 2/2 to eating non carb consistent diet, ISS readjusted)   If continuin hyperglycemia -> started insulin gtt off @ 2am, back on tight ISS with meals  HbA1C - 6.8    DVT PTX: Elliquis   GI PTX: Pepcid    ***Tubes/Lines/Drains  ***  Peripheral IV  Central Venous Line   left IJ  	Date -> new R IJ TLC  as left not secured, migrated   Arterial Line    right radial		                Date     Urinary Catheter		Indication: Strict I&O    Date Placed:     REVIEW OF SYSTEMS    [ ] A ten-point review of systems was otherwise negative except as noted.  [X ] Due to altered mental status/intubation, subjective information were not able to be obtained from the patient. History was obtained, to the extent possible, from review of the chart and collateral sources of information.      Daily     Daily Weight in k.1 (2020 06:00)    Diet, Consistent Carbohydrate/No Snacks (20 @ 16:16)      CURRENT MEDS:  Neurologic Medications  acetaminophen   Tablet .. 650 milliGRAM(s) Oral every 6 hours PRN Temp greater or equal to 38C (100.4F)    Respiratory Medications    Cardiovascular Medications  amLODIPine   Tablet 5 milliGRAM(s) Oral daily  clevidipine Infusion 1 mG/Hr IV Continuous <Continuous>  diltiazem    milliGRAM(s) Oral daily  diltiazem Infusion 5 mG/Hr IV Continuous <Continuous>  furosemide    Tablet 40 milliGRAM(s) Oral daily  metoprolol tartrate 25 milliGRAM(s) Oral every 8 hours    Gastrointestinal Medications  bisacodyl Suppository 10 milliGRAM(s) Rectal daily  famotidine    Tablet 20 milliGRAM(s) Oral daily  lactulose Syrup 10 Gram(s) Oral three times a day  senna 2 Tablet(s) Oral at bedtime  sodium bicarbonate 325 milliGRAM(s) Oral two times a day    Genitourinary Medications    Hematologic/Oncologic Medications  apixaban 5 milliGRAM(s) Oral every 12 hours  aspirin  chewable 81 milliGRAM(s) Oral daily  clopidogrel Tablet 75 milliGRAM(s) Oral daily    Antimicrobial/Immunologic Medications    Endocrine/Metabolic Medications  atorvastatin 20 milliGRAM(s) Oral at bedtime  insulin regular  human corrective regimen sliding scale   IV Push four times a day with meals    Topical/Other Medications  chlorhexidine 0.12% Liquid 15 milliLiter(s) Oral Mucosa every 12 hours  chlorhexidine 4% Liquid 1 Application(s) Topical <User Schedule>      ICU Vital Signs Last 24 Hrs  T(F): 97.7 (2020 08:11), Max: 98.9   HR: 62   BP: 124/59   BP(mean): 85   ABP: 147/55   ABP(mean): 81   RR: 17  SpO2: 91%       ABG - ( 2020 04:24 )  pH, Arterial: 7.47  pH, Blood: x     /  pCO2: 45    /  pO2: 62    / HCO3: 33    / Base Excess: 8.5   /  SaO2: 92            I&O's Summary    2020 07:01  -  2020 07:00  --------------------------------------------------------  IN: 105 mL / OUT: 1220 mL / NET: -1115 mL      I&O's Detail    2020 07:01  -  2020 07:00  --------------------------------------------------------  IN:    clevidipine Infusion: 52 mL    diltiazem Infusion: 45 mL    insulin regular Infusion: 8 mL  Total IN: 105 mL    OUT:    Indwelling Catheter - Urethral: 1220 mL  Total OUT: 1220 mL    Total NET: -1115 mL          PHYSICAL EXAM:    General/Neuro: A&Ox3.     Lungs: NC 2L.  clear to auscultation, Normal expansion/effort.     Cardiovascular : S1, S2.  Regular rate and rhythm.    GI: Abdomen soft, Non-tender, Non-distended.      Extremities: Extremities warm, pink, well-perfused    Derm: Good skin turgor, no skin breakdown.      :  No sosa     LABS:  CAPILLARY BLOOD GLUCOSE      POCT Blood Glucose.: 156 mg/dL (2020 07:47)  POCT Blood Glucose.: 97 mg/dL (2020 04:01)  POCT Blood Glucose.: 95 mg/dL (2020 02:36)  POCT Blood Glucose.: 433 mg/dL (2020 02:18)  POCT Blood Glucose.: 135 mg/dL (2020 23:58)  POCT Blood Glucose.: 266 mg/dL (2020 22:53)  POCT Blood Glucose.: 281 mg/dL (2020 20:18)  POCT Blood Glucose.: 278 mg/dL (2020 18:12)  POCT Blood Glucose.: 290 mg/dL (2020 16:02)  POCT Blood Glucose.: 201 mg/dL (2020 11:42)                          9.6    10.66 )-----------( 200      ( :20 )             31.4       04-14    148<H>  |  105  |  52<H>  ----------------------------<  187<H>  4.5   |  28  |  1.8<H>    Ca    7.9<L>      :20  Phos  3.7     04-14  Mg     2.6     04-14    TPro  5.4<L>  /  Alb  2.7<L>  /  TBili  0.7  /  DBili  x   /  AST  31  /  ALT  44<H>  /  AlkPhos  112  04-14      PT/INR - ( :20 )   PT: 14.80 sec;   INR: 1.29 ratio         PTT - ( :20 )  PTT:30.8 sec  CARDIAC MARKERS ( :20 )  x     / x     / 104 U/L / x     / x      CARDIAC MARKERS ( 2020 18:00 )  x     / 0.05 ng/mL / 113 U/L / x     / x      CARDIAC MARKERS ( 2020 23:00 )  x     / x     / 90 U/L / x     / x

## 2020-04-14 NOTE — CHART NOTE - NSCHARTNOTEFT_GEN_A_CORE
Patient seen on rounds with [facility] ICU team  Spoke with his spouse Regine today at [phone] at 2:15 pm  Updated regarding care. Doing better and started to eat by himself plan to get him OOB and ambulate  Possible downgrade from ICU to floor in 24 hours.  Overall clinically stable

## 2020-04-14 NOTE — PROGRESS NOTE ADULT - ASSESSMENT
73 y/o M with KEEGAN on CKD 3/4 in hospital for hypoxemia, cough, SOB.  PMH CAD s/p PCI, Kidney stones, DLD, HTN, CKD 3/4  COVId +. s/p extubation now on nasal cannula.  # KEEGAN on CKD 3/4  #creatinine stable  # non oliguric  # BP at goal  # d/c sodium bicarbonate  # ph at goal, no binders  # will sign off recall as needed

## 2020-04-14 NOTE — PROGRESS NOTE ADULT - SUBJECTIVE AND OBJECTIVE BOX
24 h events noted  on o2         PAST HISTORY  --------------------------------------------------------------------------------  No significant changes to PMH, PSH, FHx, SHx, unless otherwise noted    ALLERGIES & MEDICATIONS  --------------------------------------------------------------------------------  Allergies    No Known Allergies    Intolerances      Standing Inpatient Medications  amLODIPine   Tablet 5 milliGRAM(s) Oral daily  apixaban 5 milliGRAM(s) Oral every 12 hours  aspirin  chewable 81 milliGRAM(s) Oral daily  atorvastatin 20 milliGRAM(s) Oral at bedtime  bisacodyl Suppository 10 milliGRAM(s) Rectal daily  chlorhexidine 0.12% Liquid 15 milliLiter(s) Oral Mucosa every 12 hours  chlorhexidine 4% Liquid 1 Application(s) Topical <User Schedule>  clevidipine Infusion 1 mG/Hr IV Continuous <Continuous>  clopidogrel Tablet 75 milliGRAM(s) Oral daily  diltiazem    milliGRAM(s) Oral daily  diltiazem Infusion 5 mG/Hr IV Continuous <Continuous>  famotidine    Tablet 20 milliGRAM(s) Oral daily  furosemide    Tablet 40 milliGRAM(s) Oral daily  insulin regular  human corrective regimen sliding scale   IV Push four times a day with meals  lactulose Syrup 10 Gram(s) Oral three times a day  metoprolol tartrate 25 milliGRAM(s) Oral every 8 hours  senna 2 Tablet(s) Oral at bedtime  sodium bicarbonate 325 milliGRAM(s) Oral two times a day    PRN Inpatient Medications  acetaminophen   Tablet .. 650 milliGRAM(s) Oral every 6 hours PRN        VITALS/PHYSICAL EXAM  --------------------------------------------------------------------------------  T(C): 36.5 (04-14-20 @ 08:11), Max: 37.2 (04-13-20 @ 16:00)  HR: 62 (04-14-20 @ 08:11) (59 - 158)  BP: 124/59 (04-13-20 @ 23:00) (124/59 - 124/59)  RR: 17 (04-14-20 @ 08:11) (7 - 34)  SpO2: 91% (04-14-20 @ 08:11) (91% - 99%)  Wt(kg): --        04-13-20 @ 07:01  -  04-14-20 @ 07:00  --------------------------------------------------------  IN: 105 mL / OUT: 1220 mL / NET: -1115 mL      Physical Exam:  	Gen: on o2   LABS/STUDIES  --------------------------------------------------------------------------------              9.6    10.66 >-----------<  200      [04-14-20 @ 01:20]              31.4     148  |  105  |  52  ----------------------------<  187      [04-14-20 @ 01:20]  4.5   |  28  |  1.8        Ca     7.9     [04-14-20 @ 01:20]      Mg     2.6     [04-14-20 @ 01:20]      Phos  3.7     [04-14-20 @ 01:20]    TPro  5.4  /  Alb  2.7  /  TBili  0.7  /  DBili  x   /  AST  31  /  ALT  44  /  AlkPhos  112  [04-14-20 @ 01:20]    PT/INR: PT 14.80, INR 1.29       [04-14-20 @ 01:20]  PTT: 30.8       [04-14-20 @ 01:20]    Troponin 0.05      [04-13-20 @ 18:00]        [04-14-20 @ 01:20]        [04-14-20 @ 01:20]    Creatinine Trend:  SCr 1.8 [04-14 @ 01:20]  SCr 1.8 [04-13 @ 18:00]  SCr 1.7 [04-12 @ 23:00]  SCr 1.8 [04-12 @ 01:10]  SCr 1.8 [04-11 @ 16:00]    Urinalysis - [03-26-20 @ 20:35]      Color Light Yellow / Appearance Clear / SG 1.018 / pH 6.0      Gluc Negative / Ketone Negative  / Bili Negative / Urobili <2 mg/dL       Blood Negative / Protein Negative / Leuk Est Negative / Nitrite Negative      RBC  / WBC  / Hyaline  / Gran  / Sq Epi  / Non Sq Epi  / Bacteria       Ferritin 1188      [04-13-20 @ 18:00]  HbA1c 6.8      [04-06-20 @ 05:42]  Lipid: chol --, , HDL --, LDL --      [04-14-20 @ 01:20]

## 2020-04-15 LAB
ALBUMIN SERPL ELPH-MCNC: 2.5 G/DL — LOW (ref 3.5–5.2)
ALP SERPL-CCNC: 105 U/L — SIGNIFICANT CHANGE UP (ref 30–115)
ALT FLD-CCNC: 47 U/L — HIGH (ref 0–41)
ANION GAP SERPL CALC-SCNC: 10 MMOL/L — SIGNIFICANT CHANGE UP (ref 7–14)
ANION GAP SERPL CALC-SCNC: 10 MMOL/L — SIGNIFICANT CHANGE UP (ref 7–14)
AST SERPL-CCNC: 38 U/L — SIGNIFICANT CHANGE UP (ref 0–41)
BASOPHILS # BLD AUTO: 0 K/UL — SIGNIFICANT CHANGE UP (ref 0–0.2)
BASOPHILS NFR BLD AUTO: 0 % — SIGNIFICANT CHANGE UP (ref 0–1)
BILIRUB SERPL-MCNC: 0.8 MG/DL — SIGNIFICANT CHANGE UP (ref 0.2–1.2)
BUN SERPL-MCNC: 34 MG/DL — HIGH (ref 10–20)
BUN SERPL-MCNC: 43 MG/DL — HIGH (ref 10–20)
CALCIUM SERPL-MCNC: 6.4 MG/DL — LOW (ref 8.5–10.1)
CALCIUM SERPL-MCNC: 7.6 MG/DL — LOW (ref 8.5–10.1)
CHLORIDE SERPL-SCNC: 101 MMOL/L — SIGNIFICANT CHANGE UP (ref 98–110)
CHLORIDE SERPL-SCNC: 106 MMOL/L — SIGNIFICANT CHANGE UP (ref 98–110)
CO2 SERPL-SCNC: 25 MMOL/L — SIGNIFICANT CHANGE UP (ref 17–32)
CO2 SERPL-SCNC: 29 MMOL/L — SIGNIFICANT CHANGE UP (ref 17–32)
CREAT SERPL-MCNC: 1.4 MG/DL — SIGNIFICANT CHANGE UP (ref 0.7–1.5)
CREAT SERPL-MCNC: 1.6 MG/DL — HIGH (ref 0.7–1.5)
EOSINOPHIL # BLD AUTO: 0.18 K/UL — SIGNIFICANT CHANGE UP (ref 0–0.7)
EOSINOPHIL NFR BLD AUTO: 1.9 % — SIGNIFICANT CHANGE UP (ref 0–8)
GAS PNL BLDA: SIGNIFICANT CHANGE UP
GLUCOSE BLDC GLUCOMTR-MCNC: 108 MG/DL — HIGH (ref 70–99)
GLUCOSE BLDC GLUCOMTR-MCNC: 153 MG/DL — HIGH (ref 70–99)
GLUCOSE BLDC GLUCOMTR-MCNC: 155 MG/DL — HIGH (ref 70–99)
GLUCOSE BLDC GLUCOMTR-MCNC: 200 MG/DL — HIGH (ref 70–99)
GLUCOSE SERPL-MCNC: 155 MG/DL — HIGH (ref 70–99)
GLUCOSE SERPL-MCNC: 184 MG/DL — HIGH (ref 70–99)
HCT VFR BLD CALC: 29.8 % — LOW (ref 42–52)
HGB BLD-MCNC: 9.4 G/DL — LOW (ref 14–18)
IMM GRANULOCYTES NFR BLD AUTO: 1.3 % — HIGH (ref 0.1–0.3)
LYMPHOCYTES # BLD AUTO: 1.27 K/UL — SIGNIFICANT CHANGE UP (ref 1.2–3.4)
LYMPHOCYTES # BLD AUTO: 13.6 % — LOW (ref 20.5–51.1)
MAGNESIUM SERPL-MCNC: 1.6 MG/DL — LOW (ref 1.8–2.4)
MAGNESIUM SERPL-MCNC: 2 MG/DL — SIGNIFICANT CHANGE UP (ref 1.8–2.4)
MCHC RBC-ENTMCNC: 29.1 PG — SIGNIFICANT CHANGE UP (ref 27–31)
MCHC RBC-ENTMCNC: 31.5 G/DL — LOW (ref 32–37)
MCV RBC AUTO: 92.3 FL — SIGNIFICANT CHANGE UP (ref 80–94)
MONOCYTES # BLD AUTO: 0.81 K/UL — HIGH (ref 0.1–0.6)
MONOCYTES NFR BLD AUTO: 8.7 % — SIGNIFICANT CHANGE UP (ref 1.7–9.3)
NEUTROPHILS # BLD AUTO: 6.98 K/UL — HIGH (ref 1.4–6.5)
NEUTROPHILS NFR BLD AUTO: 74.5 % — SIGNIFICANT CHANGE UP (ref 42.2–75.2)
NRBC # BLD: 0 /100 WBCS — SIGNIFICANT CHANGE UP (ref 0–0)
PHOSPHATE SERPL-MCNC: 3 MG/DL — SIGNIFICANT CHANGE UP (ref 2.1–4.9)
PHOSPHATE SERPL-MCNC: 3 MG/DL — SIGNIFICANT CHANGE UP (ref 2.1–4.9)
PLATELET # BLD AUTO: 178 K/UL — SIGNIFICANT CHANGE UP (ref 130–400)
POTASSIUM SERPL-MCNC: 3.7 MMOL/L — SIGNIFICANT CHANGE UP (ref 3.5–5)
POTASSIUM SERPL-MCNC: 4.3 MMOL/L — SIGNIFICANT CHANGE UP (ref 3.5–5)
POTASSIUM SERPL-SCNC: 3.7 MMOL/L — SIGNIFICANT CHANGE UP (ref 3.5–5)
POTASSIUM SERPL-SCNC: 4.3 MMOL/L — SIGNIFICANT CHANGE UP (ref 3.5–5)
PROCALCITONIN SERPL-MCNC: 0.31 NG/ML — HIGH (ref 0.02–0.1)
PROCALCITONIN SERPL-MCNC: 0.47 NG/ML — HIGH (ref 0.02–0.1)
PROT SERPL-MCNC: 5.2 G/DL — LOW (ref 6–8)
RBC # BLD: 3.23 M/UL — LOW (ref 4.7–6.1)
RBC # FLD: 14.7 % — HIGH (ref 11.5–14.5)
SODIUM SERPL-SCNC: 140 MMOL/L — SIGNIFICANT CHANGE UP (ref 135–146)
SODIUM SERPL-SCNC: 141 MMOL/L — SIGNIFICANT CHANGE UP (ref 135–146)
TRIGL SERPL-MCNC: 108 MG/DL — SIGNIFICANT CHANGE UP (ref 10–149)
WBC # BLD: 9.36 K/UL — SIGNIFICANT CHANGE UP (ref 4.8–10.8)
WBC # FLD AUTO: 9.36 K/UL — SIGNIFICANT CHANGE UP (ref 4.8–10.8)

## 2020-04-15 PROCEDURE — 99233 SBSQ HOSP IP/OBS HIGH 50: CPT | Mod: CS

## 2020-04-15 PROCEDURE — 71045 X-RAY EXAM CHEST 1 VIEW: CPT | Mod: 26

## 2020-04-15 RX ORDER — LABETALOL HCL 100 MG
100 TABLET ORAL EVERY 8 HOURS
Refills: 0 | Status: DISCONTINUED | OUTPATIENT
Start: 2020-04-15 | End: 2020-04-15

## 2020-04-15 RX ORDER — MAGNESIUM SULFATE 500 MG/ML
2 VIAL (ML) INJECTION ONCE
Refills: 0 | Status: DISCONTINUED | OUTPATIENT
Start: 2020-04-15 | End: 2020-04-15

## 2020-04-15 RX ORDER — NIFEDIPINE 30 MG
60 TABLET, EXTENDED RELEASE 24 HR ORAL DAILY
Refills: 0 | Status: DISCONTINUED | OUTPATIENT
Start: 2020-04-15 | End: 2020-04-16

## 2020-04-15 RX ORDER — MAGNESIUM SULFATE 500 MG/ML
2 VIAL (ML) INJECTION ONCE
Refills: 0 | Status: COMPLETED | OUTPATIENT
Start: 2020-04-15 | End: 2020-04-16

## 2020-04-15 RX ORDER — MAGNESIUM SULFATE 500 MG/ML
2 VIAL (ML) INJECTION ONCE
Refills: 0 | Status: COMPLETED | OUTPATIENT
Start: 2020-04-15 | End: 2020-04-15

## 2020-04-15 RX ORDER — LABETALOL HCL 100 MG
200 TABLET ORAL EVERY 8 HOURS
Refills: 0 | Status: DISCONTINUED | OUTPATIENT
Start: 2020-04-15 | End: 2020-04-16

## 2020-04-15 RX ADMIN — CHLORHEXIDINE GLUCONATE 15 MILLILITER(S): 213 SOLUTION TOPICAL at 05:52

## 2020-04-15 RX ADMIN — FAMOTIDINE 20 MILLIGRAM(S): 10 INJECTION INTRAVENOUS at 12:21

## 2020-04-15 RX ADMIN — AMLODIPINE BESYLATE 10 MILLIGRAM(S): 2.5 TABLET ORAL at 05:52

## 2020-04-15 RX ADMIN — CHLORHEXIDINE GLUCONATE 15 MILLILITER(S): 213 SOLUTION TOPICAL at 16:55

## 2020-04-15 RX ADMIN — Medication 81 MILLIGRAM(S): at 12:20

## 2020-04-15 RX ADMIN — SENNA PLUS 2 TABLET(S): 8.6 TABLET ORAL at 22:34

## 2020-04-15 RX ADMIN — Medication 100 MILLIGRAM(S): at 16:57

## 2020-04-15 RX ADMIN — Medication 2 MG/HR: at 18:00

## 2020-04-15 RX ADMIN — INSULIN HUMAN 2: 100 INJECTION, SOLUTION SUBCUTANEOUS at 08:25

## 2020-04-15 RX ADMIN — Medication 120 MILLIGRAM(S): at 05:51

## 2020-04-15 RX ADMIN — Medication 325 MILLIGRAM(S): at 05:51

## 2020-04-15 RX ADMIN — Medication 40 MILLIGRAM(S): at 05:51

## 2020-04-15 RX ADMIN — INSULIN HUMAN 4: 100 INJECTION, SOLUTION SUBCUTANEOUS at 13:04

## 2020-04-15 RX ADMIN — ATORVASTATIN CALCIUM 20 MILLIGRAM(S): 80 TABLET, FILM COATED ORAL at 22:33

## 2020-04-15 RX ADMIN — Medication 200 MILLIGRAM(S): at 22:33

## 2020-04-15 RX ADMIN — Medication 25 GRAM(S): at 22:01

## 2020-04-15 RX ADMIN — CLOPIDOGREL BISULFATE 75 MILLIGRAM(S): 75 TABLET, FILM COATED ORAL at 12:21

## 2020-04-15 RX ADMIN — CHLORHEXIDINE GLUCONATE 1 APPLICATION(S): 213 SOLUTION TOPICAL at 05:53

## 2020-04-15 RX ADMIN — APIXABAN 5 MILLIGRAM(S): 2.5 TABLET, FILM COATED ORAL at 12:22

## 2020-04-15 RX ADMIN — Medication 25 MILLIGRAM(S): at 05:51

## 2020-04-15 RX ADMIN — Medication 325 MILLIGRAM(S): at 16:54

## 2020-04-15 RX ADMIN — Medication 2 MG/HR: at 22:34

## 2020-04-15 RX ADMIN — INSULIN HUMAN 2: 100 INJECTION, SOLUTION SUBCUTANEOUS at 16:55

## 2020-04-15 RX ADMIN — Medication 2 MG/HR: at 22:01

## 2020-04-15 NOTE — PROGRESS NOTE ADULT - ASSESSMENT
Neurologic:   No sedation. Monitor for changes in mental status     Respiratory:   acute hypoxic respiratory failure due to COVID PNA s/p intubation 4/7  -Extubated 4/12  Monitor O2 sats. ABG if needed     Cardiovascular:   Acute HTN- Cleviprex. SBP <150   -on Plavix/ASA for h/o PCI/TEMI  -Metoprolol 25 Q 8   -Cardizem ER PO   -Amlodipine 10 PO   -Troponin 0.04, stable     Gastrointestinal/Nutrition:  Carb consistent diet   PPI and Senna, ducloax. BM 4/13     Genitourinary/Renal:   BUN/Cr 46/1.7   No sosa. Pending TOV   Monitor lytes, replete as needed     Hematologic:   Elliquis x3 weeks for new onset A fib   monitor H/H   Monitor Platelets     Infectious Disease:   COVID-19 PNA - completed course of Plaquenil/Azithromycin and Anakinra x 3days  -procalcitonin 0.14-> 0.18 4/10   -Trend WBC   -Afebrile     Endocrine:   Hyperglycemia- on ISS  FS    HbA1C - 6.8      D/W Dr Rizvi

## 2020-04-15 NOTE — CHART NOTE - NSCHARTNOTEFT_GEN_A_CORE
Patient seen on rounds with [facility] ICU team with attending Dr. Rizvi    Spoke with his spouse Regine today at [phone] at 1:30 pm  Updated regarding care. Doing better and started to eat by himself plan to get him OOB and ambulate  Patient remains in ICU as still requiring Claviprex IV  and may go to floor in 24 hours.  Overall clinically stable   Family gave another number to call and leave message  at [phone], For emergency can contact the original number because of holidays.

## 2020-04-15 NOTE — PROGRESS NOTE ADULT - SUBJECTIVE AND OBJECTIVE BOX
KEZIA MOYER  [id number]  74y Male    Indication for ICU admission: COVID PNA, intubated on , transferred from F9 to burn ICU for further treatment    Admit Date:20  ICU Date:       No Known Allergies    PAST MEDICAL & SURGICAL HISTORY:  CAD (coronary artery disease)  Kidney stones  High cholesterol  HTN (hypertension)  S/P drug eluting coronary stent placement: &gt; 10 years ago  CKD    Home Medications:  clopidogrel 75 mg oral tablet: 1 tab(s) orally once a day (30 Mar 2020 10:01)  FEBUXOSTAT  40 MG TABS:  (26 Mar 2020 01:49)  FUROSEMIDE  40 MG TABS:  (26 Mar 2020 01:49)  metoprolol tartrate 25 mg oral tablet: 1 tab(s) orally 2 times a day (30 Mar 2020 10:01)  PIOGLITAZONE HYDROCHLORIDE  30 MG TABS:  (26 Mar 2020 01:49)  ROSUVASTATIN CALCIUM  20 MG TABS:  (26 Mar 2020 01:48)      24HRS EVENT:    Overnight: converted back to afib overnight. Desaturated when patient layed flat and increase NC to 4L     Neurologic:   No sedation. Following commands. A&Ox3.     Respiratory:   acute hypoxic respiratory failure due to COVID PNA s/p intubation   -Extubated   satting 96% on 2L  -1L    Cardiovascular:   -Paroxsymal Afib overnight, no pushes needed  Acute HTN- on Cleviprex  @3mg/hr  -on Plavix for h/o PCI/TEMI  -Metoprolol 25 Q 8  -Lasix 40 PO daily   -Cardizem  qd  -Increased Amlodipine to 10   -Troponin 0.04, stable     Gastrointestinal/Nutrition:  Carb consistent diet   PPI and Senna, lactulose, monitor for BM (none for 5 days); started on dulcolax [place]   Triglycerides 102>122 >148>108  1 BM     Genitourinary/Renal:   KEEGAN on CKD resolving, non-oliguric (peak Cr 4.2)  BUN/Cr  52/1.8 stable > 43/1.6  Voiding   Na 140/ K 4.6/ Mg 2.0/ Phos 3.0     Hematologic:   Elliquis 5 BID x3 weeks for paroxsymal Afib  continue ASA/ Plavix   Hgb downtrending 11>10.4>9.6>9.4  Plts 178  dD 552    Infectious Disease:   COVID-19 PNA - completed course of Plaquenil/Azithromycin and Anakinra x 3days  -procalcitonin 0.14-> 0.18--> 0.27 > 0.47  -WBC 14>10.6 >9.4   -Afebrile     Endocrine:   Hyperglycemia- on ISS w/ meals, last 188  HbA1C - 6.8    DVT PTX: Elliquis   GI PTX: Pepcid    ***Tubes/Lines/Drains  ***  Peripheral IV  Central Venous Line   left IJ  	Date -> new R IJ TLC  as left not secured, migrated   Arterial Line    right radial		                Date     Urinary Catheter		Indication: Strict I&O    Date Placed:     REVIEW OF SYSTEMS    [ ] A ten-point review of systems was otherwise negative except as noted.  [X ] Due to altered mental status/intubation, subjective information were not able to be obtained from the patient. History was obtained, to the extent possible, from review of the chart and collateral sources of information.        Daily     Daily Weight in k.1 (15 Apr 2020 05:50)    Diet, Consistent Carbohydrate/No Snacks (20 @ 16:16)      CURRENT MEDS:  Neurologic Medications  acetaminophen   Tablet .. 650 milliGRAM(s) Oral every 6 hours PRN Temp greater or equal to 38C (100.4F)    Respiratory Medications    Cardiovascular Medications  amLODIPine   Tablet 10 milliGRAM(s) Oral daily  clevidipine Infusion 1 mG/Hr IV Continuous <Continuous>  diltiazem    milliGRAM(s) Oral daily  furosemide    Tablet 40 milliGRAM(s) Oral daily  metoprolol tartrate 25 milliGRAM(s) Oral every 8 hours    Gastrointestinal Medications  bisacodyl Suppository 10 milliGRAM(s) Rectal daily  famotidine    Tablet 20 milliGRAM(s) Oral daily  senna 2 Tablet(s) Oral at bedtime  sodium bicarbonate 325 milliGRAM(s) Oral two times a day    Genitourinary Medications    Hematologic/Oncologic Medications  apixaban 5 milliGRAM(s) Oral every 12 hours  aspirin  chewable 81 milliGRAM(s) Oral daily  clopidogrel Tablet 75 milliGRAM(s) Oral daily    Antimicrobial/Immunologic Medications    Endocrine/Metabolic Medications  atorvastatin 20 milliGRAM(s) Oral at bedtime  insulin regular  human corrective regimen sliding scale   IV Push four times a day with meals    Topical/Other Medications  chlorhexidine 0.12% Liquid 15 milliLiter(s) Oral Mucosa every 12 hours  chlorhexidine 4% Liquid 1 Application(s) Topical <User Schedule>      ICU Vital Signs Last 24 Hrs  T(F): 98.2 , Max: 98.2   HR: 111   ABP: 132/52   ABP(mean): 72   RR: 17  SpO2: 91%       ABG - ( 2020 04:24 )  pH, Arterial: 7.47  pH, Blood: x     /  pCO2: 45    /  pO2: 62    / HCO3: 33    / Base Excess: 8.5   /  SaO2: 92            I&O's Summary    2020 07:  -  15 Apr 2020 07:00  --------------------------------------------------------  IN: 520 mL / OUT: 1000 mL / NET: -480 mL      I&O's Detail    2020 07:01  -  15 Apr 2020 07:00  --------------------------------------------------------  IN:    Oral Fluid: 520 mL  Total IN: 520 mL    OUT:    Voided: 1000 mL  Total OUT: 1000 mL    Total NET: -480 mL          PHYSICAL EXAM:    General/Neuro: A&Ox3.     Lungs: NC 4L.     Cardiovascular : S1, S2.  Regular rate and rhythm.    GI: Abdomen soft, Non-tender, Non-distended.      Extremities: Extremities warm, pink, well-perfused    Derm: Good skin turgor, no skin breakdown.      :  No sosa. Voiding.     LABS:  CAPILLARY BLOOD GLUCOSE      POCT Blood Glucose.: 188 mg/dL (2020 22:05)  POCT Blood Glucose.: 142 mg/dL (2020 16:55)  POCT Blood Glucose.: 257 mg/dL (2020 11:48)                          9.4    9.36  )-----------( 178      ( 2020 23:30 )             29.8       04-14    140  |  101  |  43<H>  ----------------------------<  184<H>  4.3   |  29  |  1.6<H>    Ca    7.6<L>      2020 23:30  Phos  3.0     04-14  Mg     2.0     04-14    TPro  5.2<L>  /  Alb  2.5<L>  /  TBili  0.8  /  DBili  x   /  AST  38  /  ALT  47<H>  /  AlkPhos  105  04-14      PT/INR - ( 2020 01:20 )   PT: 14.80 sec;   INR: 1.29 ratio         PTT - ( 2020 01:20 )  PTT:30.8 sec  CARDIAC MARKERS ( 2020 01:20 )  x     / x     / 104 U/L / x     / x      CARDIAC MARKERS ( 2020 18:00 )  x     / 0.05 ng/mL / 113 U/L / x     / x

## 2020-04-16 LAB
ALBUMIN SERPL ELPH-MCNC: 2.6 G/DL — LOW (ref 3.5–5.2)
ALP SERPL-CCNC: 112 U/L — SIGNIFICANT CHANGE UP (ref 30–115)
ALT FLD-CCNC: 59 U/L — HIGH (ref 0–41)
ANION GAP SERPL CALC-SCNC: 10 MMOL/L — SIGNIFICANT CHANGE UP (ref 7–14)
APTT BLD: 30.1 SEC — SIGNIFICANT CHANGE UP (ref 27–39.2)
AST SERPL-CCNC: 50 U/L — HIGH (ref 0–41)
BASOPHILS # BLD AUTO: 0.01 K/UL — SIGNIFICANT CHANGE UP (ref 0–0.2)
BASOPHILS NFR BLD AUTO: 0.1 % — SIGNIFICANT CHANGE UP (ref 0–1)
BILIRUB SERPL-MCNC: 0.8 MG/DL — SIGNIFICANT CHANGE UP (ref 0.2–1.2)
BUN SERPL-MCNC: 37 MG/DL — HIGH (ref 10–20)
CALCIUM SERPL-MCNC: 7.7 MG/DL — LOW (ref 8.5–10.1)
CHLORIDE SERPL-SCNC: 99 MMOL/L — SIGNIFICANT CHANGE UP (ref 98–110)
CK SERPL-CCNC: 126 U/L — SIGNIFICANT CHANGE UP (ref 0–225)
CO2 SERPL-SCNC: 29 MMOL/L — SIGNIFICANT CHANGE UP (ref 17–32)
CREAT SERPL-MCNC: 1.8 MG/DL — HIGH (ref 0.7–1.5)
CRP SERPL-MCNC: 4.2 MG/DL — HIGH (ref 0–0.4)
D DIMER BLD IA.RAPID-MCNC: 516 NG/ML DDU — HIGH (ref 0–230)
EOSINOPHIL # BLD AUTO: 0.09 K/UL — SIGNIFICANT CHANGE UP (ref 0–0.7)
EOSINOPHIL NFR BLD AUTO: 1 % — SIGNIFICANT CHANGE UP (ref 0–8)
FERRITIN SERPL-MCNC: 967 NG/ML — HIGH (ref 30–400)
GAS PNL BLDA: SIGNIFICANT CHANGE UP
GLUCOSE BLDC GLUCOMTR-MCNC: 150 MG/DL — HIGH (ref 70–99)
GLUCOSE BLDC GLUCOMTR-MCNC: 171 MG/DL — HIGH (ref 70–99)
GLUCOSE BLDC GLUCOMTR-MCNC: 213 MG/DL — HIGH (ref 70–99)
GLUCOSE BLDC GLUCOMTR-MCNC: 236 MG/DL — HIGH (ref 70–99)
GLUCOSE SERPL-MCNC: 199 MG/DL — HIGH (ref 70–99)
HCT VFR BLD CALC: 29 % — LOW (ref 42–52)
HGB BLD-MCNC: 9.4 G/DL — LOW (ref 14–18)
IMM GRANULOCYTES NFR BLD AUTO: 1 % — HIGH (ref 0.1–0.3)
INR BLD: 1.28 RATIO — SIGNIFICANT CHANGE UP (ref 0.65–1.3)
LACTATE SERPL-SCNC: 0.9 MMOL/L — SIGNIFICANT CHANGE UP (ref 0.7–2)
LDH SERPL L TO P-CCNC: 392 U/L — HIGH (ref 50–242)
LYMPHOCYTES # BLD AUTO: 1.04 K/UL — LOW (ref 1.2–3.4)
LYMPHOCYTES # BLD AUTO: 11.7 % — LOW (ref 20.5–51.1)
MAGNESIUM SERPL-MCNC: 2.4 MG/DL — SIGNIFICANT CHANGE UP (ref 1.8–2.4)
MCHC RBC-ENTMCNC: 30.2 PG — SIGNIFICANT CHANGE UP (ref 27–31)
MCHC RBC-ENTMCNC: 32.4 G/DL — SIGNIFICANT CHANGE UP (ref 32–37)
MCV RBC AUTO: 93.2 FL — SIGNIFICANT CHANGE UP (ref 80–94)
MONOCYTES # BLD AUTO: 0.67 K/UL — HIGH (ref 0.1–0.6)
MONOCYTES NFR BLD AUTO: 7.5 % — SIGNIFICANT CHANGE UP (ref 1.7–9.3)
NEUTROPHILS # BLD AUTO: 7.01 K/UL — HIGH (ref 1.4–6.5)
NEUTROPHILS NFR BLD AUTO: 78.7 % — HIGH (ref 42.2–75.2)
NRBC # BLD: 0 /100 WBCS — SIGNIFICANT CHANGE UP (ref 0–0)
PHOSPHATE SERPL-MCNC: 3.9 MG/DL — SIGNIFICANT CHANGE UP (ref 2.1–4.9)
PLATELET # BLD AUTO: 171 K/UL — SIGNIFICANT CHANGE UP (ref 130–400)
POTASSIUM SERPL-MCNC: 4.2 MMOL/L — SIGNIFICANT CHANGE UP (ref 3.5–5)
POTASSIUM SERPL-SCNC: 4.2 MMOL/L — SIGNIFICANT CHANGE UP (ref 3.5–5)
PROCALCITONIN SERPL-MCNC: 0.23 NG/ML — HIGH (ref 0.02–0.1)
PROT SERPL-MCNC: 5.3 G/DL — LOW (ref 6–8)
PROTHROM AB SERPL-ACNC: 14.7 SEC — HIGH (ref 9.95–12.87)
RBC # BLD: 3.11 M/UL — LOW (ref 4.7–6.1)
RBC # FLD: 14.6 % — HIGH (ref 11.5–14.5)
SODIUM SERPL-SCNC: 138 MMOL/L — SIGNIFICANT CHANGE UP (ref 135–146)
TRIGL SERPL-MCNC: 93 MG/DL — SIGNIFICANT CHANGE UP (ref 10–149)
WBC # BLD: 8.91 K/UL — SIGNIFICANT CHANGE UP (ref 4.8–10.8)
WBC # FLD AUTO: 8.91 K/UL — SIGNIFICANT CHANGE UP (ref 4.8–10.8)

## 2020-04-16 PROCEDURE — 71045 X-RAY EXAM CHEST 1 VIEW: CPT | Mod: 26

## 2020-04-16 PROCEDURE — 99233 SBSQ HOSP IP/OBS HIGH 50: CPT | Mod: CS

## 2020-04-16 RX ORDER — NIFEDIPINE 30 MG
60 TABLET, EXTENDED RELEASE 24 HR ORAL ONCE
Refills: 0 | Status: COMPLETED | OUTPATIENT
Start: 2020-04-16 | End: 2020-04-16

## 2020-04-16 RX ORDER — INSULIN LISPRO 100/ML
VIAL (ML) SUBCUTANEOUS
Refills: 0 | Status: DISCONTINUED | OUTPATIENT
Start: 2020-04-16 | End: 2020-04-19

## 2020-04-16 RX ORDER — NIFEDIPINE 30 MG
60 TABLET, EXTENDED RELEASE 24 HR ORAL DAILY
Refills: 0 | Status: DISCONTINUED | OUTPATIENT
Start: 2020-04-17 | End: 2020-04-17

## 2020-04-16 RX ORDER — LABETALOL HCL 100 MG
100 TABLET ORAL EVERY 8 HOURS
Refills: 0 | Status: DISCONTINUED | OUTPATIENT
Start: 2020-04-16 | End: 2020-04-17

## 2020-04-16 RX ORDER — INSULIN HUMAN 100 [IU]/ML
6 INJECTION, SOLUTION SUBCUTANEOUS ONCE
Refills: 0 | Status: COMPLETED | OUTPATIENT
Start: 2020-04-16 | End: 2020-04-16

## 2020-04-16 RX ORDER — INSULIN HUMAN 100 [IU]/ML
INJECTION, SOLUTION SUBCUTANEOUS
Refills: 0 | Status: DISCONTINUED | OUTPATIENT
Start: 2020-04-16 | End: 2020-04-16

## 2020-04-16 RX ADMIN — Medication 2 MG/HR: at 05:19

## 2020-04-16 RX ADMIN — APIXABAN 5 MILLIGRAM(S): 2.5 TABLET, FILM COATED ORAL at 12:57

## 2020-04-16 RX ADMIN — CLOPIDOGREL BISULFATE 75 MILLIGRAM(S): 75 TABLET, FILM COATED ORAL at 12:24

## 2020-04-16 RX ADMIN — FAMOTIDINE 20 MILLIGRAM(S): 10 INJECTION INTRAVENOUS at 12:24

## 2020-04-16 RX ADMIN — CHLORHEXIDINE GLUCONATE 15 MILLILITER(S): 213 SOLUTION TOPICAL at 05:20

## 2020-04-16 RX ADMIN — INSULIN HUMAN 6 UNIT(S): 100 INJECTION, SOLUTION SUBCUTANEOUS at 12:55

## 2020-04-16 RX ADMIN — Medication 100 MILLIGRAM(S): at 23:21

## 2020-04-16 RX ADMIN — Medication 2: at 17:40

## 2020-04-16 RX ADMIN — Medication 200 MILLIGRAM(S): at 05:24

## 2020-04-16 RX ADMIN — ATORVASTATIN CALCIUM 20 MILLIGRAM(S): 80 TABLET, FILM COATED ORAL at 23:20

## 2020-04-16 RX ADMIN — Medication 60 MILLIGRAM(S): at 05:20

## 2020-04-16 RX ADMIN — APIXABAN 5 MILLIGRAM(S): 2.5 TABLET, FILM COATED ORAL at 01:36

## 2020-04-16 RX ADMIN — SENNA PLUS 2 TABLET(S): 8.6 TABLET ORAL at 23:21

## 2020-04-16 RX ADMIN — Medication 40 MILLIGRAM(S): at 05:20

## 2020-04-16 RX ADMIN — Medication 6: at 21:56

## 2020-04-16 RX ADMIN — Medication 120 MILLIGRAM(S): at 05:19

## 2020-04-16 RX ADMIN — Medication 325 MILLIGRAM(S): at 17:42

## 2020-04-16 RX ADMIN — Medication 325 MILLIGRAM(S): at 05:20

## 2020-04-16 RX ADMIN — Medication 50 GRAM(S): at 01:13

## 2020-04-16 RX ADMIN — Medication 81 MILLIGRAM(S): at 12:24

## 2020-04-16 NOTE — CHART NOTE - NSCHARTNOTEFT_GEN_A_CORE
Patient seen on rounds with [facility] ICU team with attending Dr. Rizvi and team    Spoke with his spouse Regine today at [phone] at 12:40 pm  Updated regarding care. Doing better and started to eat by himself and moving bowels plan to get him OOB and ambulate.  Patient off Claviprex IV now and  and downgrade to tele for monitoring his rhythm and blood pressure for next day or so prior to discharge .  Overall clinically stable.    On nasal canula 2l only with stable o2 saturation.  All questions answered

## 2020-04-16 NOTE — PROGRESS NOTE ADULT - SUBJECTIVE AND OBJECTIVE BOX
Daily     Daily     Diet, Consistent Carbohydrate/No Snacks (04-13-20 @ 16:16)      CURRENT MEDS:  Neurologic Medications  acetaminophen   Tablet .. 650 milliGRAM(s) Oral every 6 hours PRN Temp greater or equal to 38C (100.4F)    Respiratory Medications    Cardiovascular Medications  clevidipine Infusion 1 mG/Hr IV Continuous <Continuous>  diltiazem    milliGRAM(s) Oral daily  furosemide    Tablet 40 milliGRAM(s) Oral daily  labetalol 200 milliGRAM(s) Oral every 8 hours    Gastrointestinal Medications  bisacodyl Suppository 10 milliGRAM(s) Rectal daily  famotidine    Tablet 20 milliGRAM(s) Oral daily  senna 2 Tablet(s) Oral at bedtime  sodium bicarbonate 325 milliGRAM(s) Oral two times a day    Genitourinary Medications    Hematologic/Oncologic Medications  apixaban 5 milliGRAM(s) Oral every 12 hours  aspirin  chewable 81 milliGRAM(s) Oral daily  clopidogrel Tablet 75 milliGRAM(s) Oral daily    Antimicrobial/Immunologic Medications    Endocrine/Metabolic Medications  atorvastatin 20 milliGRAM(s) Oral at bedtime  insulin regular  human corrective regimen sliding scale   IV Push four times a day with meals    Topical/Other Medications  chlorhexidine 0.12% Liquid 15 milliLiter(s) Oral Mucosa every 12 hours  chlorhexidine 4% Liquid 1 Application(s) Topical <User Schedule>      ICU Vital Signs Last 24 Hrs  T(C): 35.8 (16 Apr 2020 08:00), Max: 36.9 (16 Apr 2020 00:26)  T(F): 96.5 (16 Apr 2020 08:00), Max: 98.4 (16 Apr 2020 00:26)  HR: 54 (16 Apr 2020 08:30) (54 - 94)  BP: 92/50 (16 Apr 2020 08:30) (92/50 - 131/60)  BP(mean): 67 (16 Apr 2020 08:30) (67 - 84)  ABP: 90/43 (16 Apr 2020 08:30) (90/43 - 165/58)  ABP(mean): 57 (16 Apr 2020 08:30) (57 - 94)  RR: 15 (16 Apr 2020 08:30) (15 - 26)  SpO2: 94% (16 Apr 2020 08:30) (94% - 98%)      Adult Advanced Hemodynamics Last 24 Hrs  CVP(mm Hg): --  CVP(cm H2O): --  CO: --  CI: --  PA: --  PA(mean): --  PCWP: --  SVR: --  SVRI: --  PVR: --  PVRI: --      ABG - ( 16 Apr 2020 05:04 )  pH, Arterial: 7.47  pH, Blood: x     /  pCO2: 43    /  pO2: 70    / HCO3: 31    / Base Excess: 7.0   /  SaO2: 95                  I&O's Summary    15 Apr 2020 07:01  -  16 Apr 2020 07:00  --------------------------------------------------------  IN: 238.5 mL / OUT: 0 mL / NET: 238.5 mL    16 Apr 2020 07:01  -  16 Apr 2020 09:00  --------------------------------------------------------  IN: 7 mL / OUT: 400 mL / NET: -393 mL      I&O's Detail    15 Apr 2020 07:01  -  16 Apr 2020 07:00  --------------------------------------------------------  IN:    clevidipine Infusion: 138.5 mL    IV PiggyBack: 100 mL  Total IN: 238.5 mL    OUT:  Total OUT: 0 mL    Total NET: 238.5 mL      16 Apr 2020 07:01  -  16 Apr 2020 09:00  --------------------------------------------------------  IN:    clevidipine Infusion: 7 mL  Total IN: 7 mL    OUT:    Voided: 400 mL  Total OUT: 400 mL    Total NET: -393 mL          PHYSICAL EXAM:    General/Neuro  RASS:             GCS:     = E   / V   / M      Deficits:                             alert & oriented x 3, no focal deficits  Pupils:    Lungs:      clear to auscultation, Normal expansion/effort.     Cardiovascular : S1, S2.  Regular rate and rhythm.  Peripheral edema   Cardiac Rhythm: Normal Sinus Rhythm    GI: Abdomen soft, Non-tender, Non-distended.    Gastrostomy / Jejunostomy tube in place.  Nasogastric tube in place.  Colostomy / Ileostomy.    Wound:    Extremities: Extremities warm, pink, well-perfused. Pulses:Rt     Lt    Derm: Good skin turgor, no skin breakdown.      :       Earl catheter in place.      CXR:     LABS:  CAPILLARY BLOOD GLUCOSE      POCT Blood Glucose.: 150 mg/dL (16 Apr 2020 08:27)  POCT Blood Glucose.: 108 mg/dL (15 Apr 2020 22:36)  POCT Blood Glucose.: 155 mg/dL (15 Apr 2020 16:39)  POCT Blood Glucose.: 200 mg/dL (15 Apr 2020 12:30)                          9.4    8.91  )-----------( 171      ( 16 Apr 2020 00:20 )             29.0       04-16    138  |  99  |  37<H>  ----------------------------<  199<H>  4.2   |  29  |  1.8<H>    Ca    7.7<L>      16 Apr 2020 00:20  Phos  3.9     04-16  Mg     2.4     04-16    TPro  5.3<L>  /  Alb  2.6<L>  /  TBili  0.8  /  DBili  x   /  AST  50<H>  /  ALT  59<H>  /  AlkPhos  112  04-16      PT/INR - ( 16 Apr 2020 00:20 )   PT: 14.70 sec;   INR: 1.28 ratio         PTT - ( 16 Apr 2020 00:20 )  PTT:30.1 sec  CARDIAC MARKERS ( 16 Apr 2020 00:20 )  x     / x     / 126 U/L / x     / x Daily     Daily     Diet, Consistent Carbohydrate/No Snacks (04-13-20 @ 16:16)      CURRENT MEDS:  Neurologic Medications  acetaminophen   Tablet .. 650 milliGRAM(s) Oral every 6 hours PRN Temp greater or equal to 38C (100.4F)    Respiratory Medications    Cardiovascular Medications  clevidipine Infusion 1 mG/Hr IV Continuous <Continuous>  diltiazem    milliGRAM(s) Oral daily  furosemide    Tablet 40 milliGRAM(s) Oral daily  labetalol 200 milliGRAM(s) Oral every 8 hours  KEZIA MOYER  [id number]  74y Male    Indication for ICU admission: COVID PNA, intubated on 4/7, transferred from  to burn ICU for further treatment    Admit Date:03-26-20  ICU Date: 4/7      No Known Allergies    PAST MEDICAL & SURGICAL HISTORY:  CAD (coronary artery disease)  Kidney stones  High cholesterol  HTN (hypertension)  S/P drug eluting coronary stent placement: &gt; 10 years ago  CKD    Home Medications:  clopidogrel 75 mg oral tablet: 1 tab(s) orally once a day (30 Mar 2020 10:01)  FEBUXOSTAT  40 MG TABS:  (26 Mar 2020 01:49)  FUROSEMIDE  40 MG TABS:  (26 Mar 2020 01:49)  metoprolol tartrate 25 mg oral tablet: 1 tab(s) orally 2 times a day (30 Mar 2020 10:01)  PIOGLITAZONE HYDROCHLORIDE  30 MG TABS:  (26 Mar 2020 01:49)  ROSUVASTATIN CALCIUM  20 MG TABS:  (26 Mar 2020 01:48)      24HRS EVENT:    Overnight:   HTN on Cleviprex, weaning down, gave stat dose of nifedipine 60 xl at 4am  BM+  Replaced Magnesium total 4gms mag sulfate    Neurologic:   No sedation. Following commands. A&Ox3.     Respiratory:   Acute hypoxic respiratory failure due to COVID PNA s/p intubation 4/7  Extubated 4/12  satting 96% on 4lpm 96% sat  -1L  PM ABG 7.49/42/67/32 94% lactate 0.9    Cardiovascular:    - Paroxsymal Afib overnight, no pushes needed   - Acute HTN- on Cleviprex  @4mg/hr (cuff pressure is 155mmhg lower than Alyson   - on Plavix for h/o PCI/TEMI   - Metoprolol 25 Q 8 changed to Labetolol q8    - Lasix 40 PO daily    - Cardizem  qd. Changed amplodipine to nifedipine    Gastrointestinal/Nutrition:  Carb consistent diet   PPI and bowel regimen, last BM 4/15  Triglycerides 102>122 >148>108>93    Genitourinary/Renal:   KEEGAN on CKD resolving, non-oliguric (peak Cr 4.2)  BUN/Cr  34/1.4 > 37/1.8 -encourage oral hydration  Voiding     Hematologic:   Elliquis 5 BID x3 weeks for paroxsymal Afib- reevaluate for long term anticoagluation given Paroxsymal A-fib  continue ASA/ Plavix   Hgb downtrending 11>10.4>9.6>9.4>9.4  dD 552>516    Infectious Disease:   COVID-19 PNA - completed course of Plaquenil/Azithromycin and Anakinra x 3days  -procalcitonin 0.14-> 0.18--> 0.27 > 0.47->0.31  -WBC 14>10.6 >9.4> 8.9  -Afebrile     Endocrine:   Hyperglycemia- on ISS w/ meals, last 188  HbA1C - 6.8    MSK: call PT consult per pt and family request    DVT PTX: Elliquis   GI PTX: Pepcid    ***Tubes/Lines/Drains  ***  Peripheral IV  Central Venous Line   left IJ  	Date 4/7-> new R IJ TLC 4/11   Arterial Line    right radial		                Date 4/7    Urinary Catheter	out 4/13 mn- voids:     REVIEW OF SYSTEMS    [ ] A ten-point review of systems was otherwise negative except as noted.  [X ] Due to altered mental status/intubation, subjective information were not able to be obtained from the patient. History was obtained, to the extent possible, from review of the chart and collateral sources of information.      Gastrointestinal Medications  bisacodyl Suppository 10 milliGRAM(s) Rectal daily  famotidine    Tablet 20 milliGRAM(s) Oral daily  senna 2 Tablet(s) Oral at bedtime  sodium bicarbonate 325 milliGRAM(s) Oral two times a day    Genitourinary Medications    Hematologic/Oncologic Medications  apixaban 5 milliGRAM(s) Oral every 12 hours  aspirin  chewable 81 milliGRAM(s) Oral daily  clopidogrel Tablet 75 milliGRAM(s) Oral daily    Antimicrobial/Immunologic Medications    Endocrine/Metabolic Medications  atorvastatin 20 milliGRAM(s) Oral at bedtime  insulin regular  human corrective regimen sliding scale   IV Push four times a day with meals    Topical/Other Medications  chlorhexidine 0.12% Liquid 15 milliLiter(s) Oral Mucosa every 12 hours  chlorhexidine 4% Liquid 1 Application(s) Topical <User Schedule>      ICU Vital Signs Last 24 Hrs  T(C): 35.8 (16 Apr 2020 08:00), Max: 36.9 (16 Apr 2020 00:26)  T(F): 96.5 (16 Apr 2020 08:00), Max: 98.4 (16 Apr 2020 00:26)  HR: 54 (16 Apr 2020 08:30) (54 - 94)  BP: 92/50 (16 Apr 2020 08:30) (92/50 - 131/60)  BP(mean): 67 (16 Apr 2020 08:30) (67 - 84)  ABP: 90/43 (16 Apr 2020 08:30) (90/43 - 165/58)  ABP(mean): 57 (16 Apr 2020 08:30) (57 - 94)  RR: 15 (16 Apr 2020 08:30) (15 - 26)  SpO2: 94% (16 Apr 2020 08:30) (94% - 98%)    ABG - ( 16 Apr 2020 05:04 )  pH, Arterial: 7.47  pH, Blood: x     /  pCO2: 43    /  pO2: 70    / HCO3: 31    / Base Excess: 7.0   /  SaO2: 95          I&O's Summary    15 Apr 2020 07:01  -  16 Apr 2020 07:00  --------------------------------------------------------  IN: 238.5 mL / OUT: 0 mL / NET: 238.5 mL    16 Apr 2020 07:01  -  16 Apr 2020 09:00  --------------------------------------------------------  IN: 7 mL / OUT: 400 mL / NET: -393 mL      I&O's Detail    15 Apr 2020 07:01  -  16 Apr 2020 07:00  --------------------------------------------------------  IN:    clevidipine Infusion: 138.5 mL    IV PiggyBack: 100 mL  Total IN: 238.5 mL    OUT:  Total OUT: 0 mL    Total NET: 238.5 mL      16 Apr 2020 07:01  -  16 Apr 2020 09:00  --------------------------------------------------------  IN:    clevidipine Infusion: 7 mL  Total IN: 7 mL    OUT:    Voided: 400 mL  Total OUT: 400 mL    Total NET: -393 mL          PHYSICAL EXAM:    General/Neuro    Lungs: On NC @ 2, breath sounds present bilaterally   Cardiovascular : S1, S2.  Regular rate and rhythm.    GI: Abdomen soft, Non-tender, Non-distended.    Extremities: Extremities warm, pink, well-perfused.  Derm: Good skin turgor, no skin breakdown.    : Earl catheter in place.      CXR: < from: Xray Chest 1 View- PORTABLE-Routine (04.16.20 @ 03:58) >  Impression:      No significant change in bilateral opacities.      < end of copied text >       LABS:  CAPILLARY BLOOD GLUCOSE      POCT Blood Glucose.: 150 mg/dL (16 Apr 2020 08:27)  POCT Blood Glucose.: 108 mg/dL (15 Apr 2020 22:36)  POCT Blood Glucose.: 155 mg/dL (15 Apr 2020 16:39)  POCT Blood Glucose.: 200 mg/dL (15 Apr 2020 12:30)                          9.4    8.91  )-----------( 171      ( 16 Apr 2020 00:20 )             29.0       04-16    138  |  99  |  37<H>  ----------------------------<  199<H>  4.2   |  29  |  1.8<H>    Ca    7.7<L>      16 Apr 2020 00:20  Phos  3.9     04-16  Mg     2.4     04-16    TPro  5.3<L>  /  Alb  2.6<L>  /  TBili  0.8  /  DBili  x   /  AST  50<H>  /  ALT  59<H>  /  AlkPhos  112  04-16      PT/INR - ( 16 Apr 2020 00:20 )   PT: 14.70 sec;   INR: 1.28 ratio         PTT - ( 16 Apr 2020 00:20 )  PTT:30.1 sec  CARDIAC MARKERS ( 16 Apr 2020 00:20 )  x     / x     / 126 U/L / x     / x

## 2020-04-16 NOTE — CHART NOTE - NSCHARTNOTEFT_GEN_A_CORE
Registered Dietitian Follow-Up     Patient Profile Reviewed                           Yes [x]   No []     Nutrition History Previously Obtained        Yes [x]  No []       Pertinent Subjective Information: Pt tolerating po diet since advancement, and consuming at least 50% of each meal. Pt still with constipation, with bowel regimen in place.     Pertinent Medical Interventions: Pt went into Afib overnight (4/14-15) and desaturated, NC increased to 4L. Supportive management of COVID19. Remaining in ICU d/t HTN + respiratory monitoring.      Diet order: Consistent CHO/no snacks     Anthropometrics:  - Ht. 67"  - Wt. dosing 95 kg/209 lbs; (4/15) 93.1 kg/205 lbs; (4/14) 91.1 kg/201 lbs  - %wt change ranging from 201-217 lbs throughout LOS; likely edema/fluid related wt fluctuations + pt on diuretics  - BMI 32.8 using dosing wt  - IBW  67.3 kg/148 lbs     Pertinent Lab Data: (4/16) RBC 3.11, H/H 9.4/29.0, POC glucose 150, BUN 37, Cr 1.8, glucose 199; (4/6) HbA1c 6.8     Pertinent Meds: eliquis, humulin, normodyne, lasix, lipitor, dulcolax, cleviprex, senna, pepcid, sodium bicarbonate     Physical Findings:  - Appearance: obese; (4/14) +1 generalized edema  - GI function: constipation, last BM 4/16; on bowel regimen  - Tubes: NA  - Oral/Mouth cavity: per SLP on 4/13 "+eyal-pharyngeal swallow is WFL for thin, puree and regular solids w/o overt s/s of penetration/aspiration" rec regular w/ thins  - Skin: WDL (4/15)     Nutrition Requirements  Weight Used: IBW 67.3 kg/148 lbs (needs continued from previous assessment on 4/13)     Estimated Energy Needs    Continue [x] 7284-4625 kcal/day (25-30 kcal/kg IBW)--obese BMI considered      Estimated Protein Needs    Continue [x] 67-80 gm/day (1-1.2 gm/kg IBW)--same as above       Estimated Fluid Needs        Continue [x] per LIP      Nutrient Intake: >50% (fair)        [x] Previous Nutrition Diagnosis: Inadequate protein/energy intake            [x] Ongoing               Nutrition Intervention meals and snacks, medical food supplements, coordination of care     Goal/Expected Outcome: pt to maintain po intake >75% over the next 4 days     Indicator/Monitoring: RD to monitor diet order, energy intake, body composition, glucose/renal profiles    Recommendation: Add DASH/TLC + kosher modifiers to current diet order, add Glucerna supplement BID, encourage po intake, provide assistance with meals PRN Registered Dietitian Follow-Up     Patient Profile Reviewed                           Yes [x]   No []     Nutrition History Previously Obtained        Yes [x]  No []       Pertinent Subjective Information: Pt tolerating po diet since advancement, and consuming at least 50% of each meal. Pt still with constipation, with bowel regimen in place.     Pertinent Medical Interventions: Pt went into Afib overnight (4/14-15) and desaturated, NC increased to 4L. Supportive management of COVID19. Remaining in ICU d/t HTN + respiratory monitoring.      Diet order: Consistent CHO/no snacks     Anthropometrics:  - Ht. 67"  - Wt. dosing 95 kg/209 lbs; (4/15) 93.1 kg/205 lbs; (4/14) 91.1 kg/201 lbs  - %wt change ranging from 201-217 lbs throughout LOS; likely edema/fluid related wt fluctuations + pt on diuretics  - BMI 32.8 using dosing wt  - IBW  67.3 kg/148 lbs     Pertinent Lab Data: (4/16) RBC 3.11, H/H 9.4/29.0, POC glucose 150, BUN 37, Cr 1.8, glucose 199; (4/6) HbA1c 6.8     Pertinent Meds: eliquis, humulin, normodyne, lasix, lipitor, dulcolax, cleviprex, senna, pepcid, sodium bicarbonate     Physical Findings:  - Appearance: obese; (4/14) +1 generalized edema  - GI function: constipation, last BM 4/16; on bowel regimen  - Tubes: NA  - Oral/Mouth cavity: per SLP on 4/13 "+eyal-pharyngeal swallow is WFL for thin, puree and regular solids w/o overt s/s of penetration/aspiration" rec regular w/ thins  - Skin: WDL (4/15)     Nutrition Requirements  Weight Used: IBW 67.3 kg/148 lbs (needs continued from previous assessment on 4/13)     Estimated Energy Needs    Continue [x] 0578-7925 kcal/day (25-30 kcal/kg IBW)--obese BMI considered      Estimated Protein Needs    Continue [x] 67-80 gm/day (1-1.2 gm/kg IBW)--same as above       Estimated Fluid Needs        Continue [x] per LIP      Nutrient Intake: >50% (fair)        [x] Previous Nutrition Diagnosis: Inadequate protein/energy intake            [x] Ongoing               Nutrition Intervention meals and snacks, medical food supplements, coordination of care     Goal/Expected Outcome: pt to maintain po intake >75% over the next 4 days     Indicator/Monitoring: RD to monitor diet order, energy intake, body composition, glucose/renal profiles    Recommendation: Add DASH/TLC + kosher modifiers to current diet order, add Glucerna supplement BID, encourage po intake, provide assistance with meals PRN. RD attempted to discuss recs with covering resident [phone], line was busy x2 attempts.

## 2020-04-16 NOTE — PROGRESS NOTE ADULT - ASSESSMENT
Neurologic:   No sedation.    Respiratory:   acute hypoxic respiratory failure due to COVID PNA s/p intubation 4/7  -Extubated 4/12  Monitor O2 sats. ABG if needed     Cardiovascular:   -on Plavix/ASA for h/o PCI/TEMI  -Nifedipine XL 60  - Labetalol 100 q8  -Cardizem ER   - Lasix 40 PO daily     Gastrointestinal/Nutrition:  Carb consistent diet   PPI and Senna, ducloax. BM 4/13     Genitourinary/Renal:   BUN/Cr 46/1.7   No sosa. Pending TOV   Monitor lytes, replete as needed   Encourage PO intake     Hematologic:   Eliquis x3 weeks for new onset A fib   monitor H/H   Monitor Platelets   Continue Eliquis upon DC     Infectious Disease:   COVID-19 PNA - completed course of Plaquenil/Azithromycin and Anakinra x 3days  -procalcitonin 0.14-> 0.18 4/10   -Trend WBC   -Afebrile     Endocrine:   Hyperglycemia- on ISS  FS    HbA1C - 6.8    Follow Up   PT Rehab   Make family aware of changes   Do Not restart metoprolol , continue Labetalol for better control of HTN

## 2020-04-16 NOTE — CHART NOTE - NSCHARTNOTEFT_GEN_A_CORE
SICU Transfer Note:    Transfer from: SICU  Transfer to:  (  ) Surgery    (  ) Telemetry    ( x ) Medicine    (  ) Palliative    (  ) Stroke Unit    (  ) _______________      SICU COURSE:  75 y/o male with PMHx HTN, HLD, CAD s/p PCI/TEMI 10 yrs ago, CKD, who presented to ED on 3/26/20 with c/o cough, low grade temp x 1 week, and new onset SOB, also was found to be in KEEGAN on CKD.  Pt is COVID +, with worsening CXR with b/l opacities.  Pt has been on 100% NRB with adequate saturation, however the sats were getting lower, on 4/7 pt desated to 80% on 100% NRB, required emergent intubation.  During this, admission, pt also went into new onset rapid Afib, was on BB and Cardizem gtt, as well as heparin gtt started.  Pt became bradycardic, BB and Ca-blockers held for now.  Pt was transferred to Burn ICU for further management.     ICU Course   Cleviprex gtt started for hypertension.  Sedation with Propofol 2/2 bradycardia on Precedex.  Lasix given with a total of 2415 out over 24 hrs.  Insulin drip discounted. He was extubated on 4/12 and tolerated well. Patient continued on Cleviprex for htn.  S&S c/s obtained and patient was started on diet, and was out of bed to [facility].    Cleviprex gtt was weaned off on 4/15, he is now stable on his current medication and prepared for downgrade.     PAST MEDICAL & SURGICAL HISTORY:  CAD (coronary artery disease)  Kidney stones  High cholesterol  HTN (hypertension)  S/P drug eluting coronary stent placement: &gt; 10 years ago    Allergies  No Known Allergies    MEDICATIONS  (STANDING):  apixaban 5 milliGRAM(s) Oral every 12 hours  aspirin  chewable 81 milliGRAM(s) Oral daily  atorvastatin 20 milliGRAM(s) Oral at bedtime  bisacodyl Suppository 10 milliGRAM(s) Rectal daily  chlorhexidine 4% Liquid 1 Application(s) Topical <User Schedule>  clopidogrel Tablet 75 milliGRAM(s) Oral daily  diltiazem    milliGRAM(s) Oral daily  famotidine    Tablet 20 milliGRAM(s) Oral daily  furosemide    Tablet 40 milliGRAM(s) Oral daily  insulin lispro (HumaLOG) corrective regimen sliding scale   SubCutaneous Before meals and at bedtime  labetalol 100 milliGRAM(s) Oral every 8 hours  senna 2 Tablet(s) Oral at bedtime  sodium bicarbonate 325 milliGRAM(s) Oral two times a day    MEDICATIONS  (PRN):  acetaminophen   Tablet .. 650 milliGRAM(s) Oral every 6 hours PRN Temp greater or equal to 38C (100.4F)    Vital Signs Last 24 Hrs  T(C): 35.8 (16 Apr 2020 08:00), Max: 36.9 (16 Apr 2020 00:26)  T(F): 96.5 (16 Apr 2020 08:00), Max: 98.4 (16 Apr 2020 00:26)  HR: 54 (16 Apr 2020 14:00) (53 - 70)  BP: 97/52 (16 Apr 2020 14:00) (92/50 - 131/60)  BP(mean): 71 (16 Apr 2020 14:00) (67 - 84)  RR: 18 (16 Apr 2020 14:00) (15 - 26)  SpO2: 97% (16 Apr 2020 14:00) (92% - 98%)  I&O's Summary    15 Apr 2020 07:01  -  16 Apr 2020 07:00  --------------------------------------------------------  IN: 238.5 mL / OUT: 0 mL / NET: 238.5 mL    16 Apr 2020 07:01  -  16 Apr 2020 14:25  --------------------------------------------------------  IN: 587 mL / OUT: 400 mL / NET: 187 mL        LABS                                            9.4                   Neurophils% (auto):   78.7   (04-16 @ 00:20):    8.91 )-----------(171          Lymphocytes% (auto):  11.7                                          29.0                   Eosinphils% (auto):   1.0      Manual%: Neutrophils x    ; Lymphocytes x    ; Eosinophils x    ; Bands%: x    ; Blasts x                                    138    |  99     |  37                  Calcium: 7.7   / iCa: x      (04-16 @ 00:20)    ----------------------------<  199       Magnesium: 2.4                              4.2     |  29     |  1.8              Phosphorous: 3.9      TPro  5.3    /  Alb  2.6    /  TBili  0.8    /  DBili  x      /  AST  50     /  ALT  59     /  AlkPhos  112    16 Apr 2020 00:20    ( 04-16 @ 00:20 )   PT: 14.70 sec;   INR: 1.28 ratio  aPTT: 30.1 sec      Neurologic:   No sedation.    Respiratory:   acute hypoxic respiratory failure due to COVID PNA s/p intubation 4/7  -Extubated 4/12  Monitor O2 sats. ABG if needed     Cardiovascular:   -on Plavix/ASA for h/o PCI/TEMI  -Nifedipine XL 60  - Labetalol 100 q8  -Cardizem ER   - Lasix 40 PO daily     Gastrointestinal/Nutrition:  Carb consistent diet   PPI and Senna, ducloax. BM 4/13     Genitourinary/Renal:   BUN/Cr 46/1.7   No sosa. Pending TOV   Monitor lytes, replete as needed   Encourage PO intake     Hematologic:   Eliquis x3 weeks for new onset A fib   monitor H/H   Monitor Platelets   Continue Eliquis upon DC     Infectious Disease:   COVID-19 PNA - completed course of Plaquenil/Azithromycin and Anakinra x 3days  -procalcitonin 0.14-> 0.18 4/10   -Trend WBC   -Afebrile     Endocrine:   Hyperglycemia- on ISS  FS    HbA1C - 6.8    Follow Up   PT Rehab   Make family aware of changes   Do Not restart metoprolol , continue Labetalol for better control of HTN     Signed out with : SICU Transfer Note:    Transfer from: SICU  Transfer to:  (  ) Surgery    (  ) Telemetry    ( x ) Medicine    (  ) Palliative    (  ) Stroke Unit    (  ) _______________      SICU COURSE:  73 y/o male with PMHx HTN, HLD, CAD s/p PCI/TEMI 10 yrs ago, CKD, who presented to ED on 3/26/20 with c/o cough, low grade temp x 1 week, and new onset SOB, also was found to be in KEEGAN on CKD.  Pt is COVID +, with worsening CXR with b/l opacities.  Pt has been on 100% NRB with adequate saturation, however the sats were getting lower, on 4/7 pt desated to 80% on 100% NRB, required emergent intubation.  During this, admission, pt also went into new onset rapid Afib, was on BB and Cardizem gtt, as well as heparin gtt started.  Pt became bradycardic, BB and Ca-blockers held for now.  Pt was transferred to Burn ICU for further management.     ICU Course   Cleviprex gtt started for hypertension.  Sedation with Propofol 2/2 bradycardia on Precedex.  Lasix given with a total of 2415 out over 24 hrs.  Insulin drip discounted. He was extubated on 4/12 and tolerated well. Patient continued on Cleviprex for htn.  S&S c/s obtained and patient was started on diet, and was out of bed to [facility].    Cleviprex gtt was weaned off on 4/15, he is now stable on his current medication and prepared for downgrade.     PAST MEDICAL & SURGICAL HISTORY:  CAD (coronary artery disease)  Kidney stones  High cholesterol  HTN (hypertension)  S/P drug eluting coronary stent placement: &gt; 10 years ago    Allergies  No Known Allergies    MEDICATIONS  (STANDING):  apixaban 5 milliGRAM(s) Oral every 12 hours  aspirin  chewable 81 milliGRAM(s) Oral daily  atorvastatin 20 milliGRAM(s) Oral at bedtime  bisacodyl Suppository 10 milliGRAM(s) Rectal daily  chlorhexidine 4% Liquid 1 Application(s) Topical <User Schedule>  clopidogrel Tablet 75 milliGRAM(s) Oral daily  diltiazem    milliGRAM(s) Oral daily  famotidine    Tablet 20 milliGRAM(s) Oral daily  furosemide    Tablet 40 milliGRAM(s) Oral daily  insulin lispro (HumaLOG) corrective regimen sliding scale   SubCutaneous Before meals and at bedtime  labetalol 100 milliGRAM(s) Oral every 8 hours  senna 2 Tablet(s) Oral at bedtime  sodium bicarbonate 325 milliGRAM(s) Oral two times a day    MEDICATIONS  (PRN):  acetaminophen   Tablet .. 650 milliGRAM(s) Oral every 6 hours PRN Temp greater or equal to 38C (100.4F)    Vital Signs Last 24 Hrs  T(C): 35.8 (16 Apr 2020 08:00), Max: 36.9 (16 Apr 2020 00:26)  T(F): 96.5 (16 Apr 2020 08:00), Max: 98.4 (16 Apr 2020 00:26)  HR: 54 (16 Apr 2020 14:00) (53 - 70)  BP: 97/52 (16 Apr 2020 14:00) (92/50 - 131/60)  BP(mean): 71 (16 Apr 2020 14:00) (67 - 84)  RR: 18 (16 Apr 2020 14:00) (15 - 26)  SpO2: 97% (16 Apr 2020 14:00) (92% - 98%)  I&O's Summary    15 Apr 2020 07:01  -  16 Apr 2020 07:00  --------------------------------------------------------  IN: 238.5 mL / OUT: 0 mL / NET: 238.5 mL    16 Apr 2020 07:01  -  16 Apr 2020 14:25  --------------------------------------------------------  IN: 587 mL / OUT: 400 mL / NET: 187 mL        LABS                                            9.4                   Neurophils% (auto):   78.7   (04-16 @ 00:20):    8.91 )-----------(171          Lymphocytes% (auto):  11.7                                          29.0                   Eosinphils% (auto):   1.0      Manual%: Neutrophils x    ; Lymphocytes x    ; Eosinophils x    ; Bands%: x    ; Blasts x                                    138    |  99     |  37                  Calcium: 7.7   / iCa: x      (04-16 @ 00:20)    ----------------------------<  199       Magnesium: 2.4                              4.2     |  29     |  1.8              Phosphorous: 3.9      TPro  5.3    /  Alb  2.6    /  TBili  0.8    /  DBili  x      /  AST  50     /  ALT  59     /  AlkPhos  112    16 Apr 2020 00:20    ( 04-16 @ 00:20 )   PT: 14.70 sec;   INR: 1.28 ratio  aPTT: 30.1 sec      Neurologic:   No sedation.    Respiratory:   acute hypoxic respiratory failure due to COVID PNA s/p intubation 4/7  -Extubated 4/12  Monitor O2 sats. ABG if needed     Cardiovascular:   -on Plavix/ASA for h/o PCI/TEMI  -Nifedipine XL 60  - Labetalol 100 q8  -Cardizem ER   - Lasix 40 PO daily     Gastrointestinal/Nutrition:  Carb consistent diet   PPI and Senna, ducloax. BM 4/13     Genitourinary/Renal:   BUN/Cr 46/1.7   No sosa. Pending TOV   Monitor lytes, replete as needed   Encourage PO intake     Hematologic:   Eliquis x3 weeks for new onset A fib   monitor H/H   Monitor Platelets   Continue Eliquis upon DC     Infectious Disease:   COVID-19 PNA - completed course of Plaquenil/Azithromycin and Anakinra x 3days  -procalcitonin 0.14-> 0.18 4/10   -Trend WBC   -Afebrile     Endocrine:   Hyperglycemia- on ISS  FS    HbA1C - 6.8    Follow Up   PT Rehab   Make family aware of changes   Do Not restart metoprolol , continue Labetalol for better control of HTN     *** Many attempts to call medicine residents @[phone] to sign out with no response @ [phone] on 4/16** SICU Transfer Note:    Transfer from: SICU  Transfer to:  (  ) Surgery    (  ) Telemetry    ( x ) Medicine    (  ) Palliative    (  ) Stroke Unit    (  ) _______________      SICU COURSE:  73 y/o male with PMHx HTN, HLD, CAD s/p PCI/TEMI 10 yrs ago, CKD, who presented to ED on 3/26/20 with c/o cough, low grade temp x 1 week, and new onset SOB, also was found to be in KEEGAN on CKD.  Pt is COVID +, with worsening CXR with b/l opacities.  Pt has been on 100% NRB with adequate saturation, however the sats were getting lower, on 4/7 pt desated to 80% on 100% NRB, required emergent intubation.  During this, admission, pt also went into new onset rapid Afib, was on BB and Cardizem gtt, as well as heparin gtt started.  Pt became bradycardic, BB and Ca-blockers held for now.  Pt was transferred to Burn ICU for further management.     ICU Course   Cleviprex gtt started for hypertension.  Sedation with Propofol 2/2 bradycardia on Precedex.  Lasix given with a total of 2415 out over 24 hrs.  Insulin drip discounted. He was extubated on 4/12 and tolerated well. Patient continued on Cleviprex for htn.  S&S c/s obtained and patient was started on diet, and was out of bed to [facility].    Cleviprex gtt was weaned off on 4/15, he is now stable on his current medication and prepared for downgrade.     PAST MEDICAL & SURGICAL HISTORY:  CAD (coronary artery disease)  Kidney stones  High cholesterol  HTN (hypertension)  S/P drug eluting coronary stent placement: &gt; 10 years ago    Allergies  No Known Allergies    MEDICATIONS  (STANDING):  apixaban 5 milliGRAM(s) Oral every 12 hours  aspirin  chewable 81 milliGRAM(s) Oral daily  atorvastatin 20 milliGRAM(s) Oral at bedtime  bisacodyl Suppository 10 milliGRAM(s) Rectal daily  chlorhexidine 4% Liquid 1 Application(s) Topical <User Schedule>  clopidogrel Tablet 75 milliGRAM(s) Oral daily  diltiazem    milliGRAM(s) Oral daily  famotidine    Tablet 20 milliGRAM(s) Oral daily  furosemide    Tablet 40 milliGRAM(s) Oral daily  insulin lispro (HumaLOG) corrective regimen sliding scale   SubCutaneous Before meals and at bedtime  labetalol 100 milliGRAM(s) Oral every 8 hours  senna 2 Tablet(s) Oral at bedtime  sodium bicarbonate 325 milliGRAM(s) Oral two times a day    MEDICATIONS  (PRN):  acetaminophen   Tablet .. 650 milliGRAM(s) Oral every 6 hours PRN Temp greater or equal to 38C (100.4F)    Vital Signs Last 24 Hrs  T(C): 35.8 (16 Apr 2020 08:00), Max: 36.9 (16 Apr 2020 00:26)  T(F): 96.5 (16 Apr 2020 08:00), Max: 98.4 (16 Apr 2020 00:26)  HR: 54 (16 Apr 2020 14:00) (53 - 70)  BP: 97/52 (16 Apr 2020 14:00) (92/50 - 131/60)  BP(mean): 71 (16 Apr 2020 14:00) (67 - 84)  RR: 18 (16 Apr 2020 14:00) (15 - 26)  SpO2: 97% (16 Apr 2020 14:00) (92% - 98%)  I&O's Summary    15 Apr 2020 07:01  -  16 Apr 2020 07:00  --------------------------------------------------------  IN: 238.5 mL / OUT: 0 mL / NET: 238.5 mL    16 Apr 2020 07:01  -  16 Apr 2020 14:25  --------------------------------------------------------  IN: 587 mL / OUT: 400 mL / NET: 187 mL        LABS                                            9.4                   Neurophils% (auto):   78.7   (04-16 @ 00:20):    8.91 )-----------(171          Lymphocytes% (auto):  11.7                                          29.0                   Eosinphils% (auto):   1.0      Manual%: Neutrophils x    ; Lymphocytes x    ; Eosinophils x    ; Bands%: x    ; Blasts x                                    138    |  99     |  37                  Calcium: 7.7   / iCa: x      (04-16 @ 00:20)    ----------------------------<  199       Magnesium: 2.4                              4.2     |  29     |  1.8              Phosphorous: 3.9      TPro  5.3    /  Alb  2.6    /  TBili  0.8    /  DBili  x      /  AST  50     /  ALT  59     /  AlkPhos  112    16 Apr 2020 00:20    ( 04-16 @ 00:20 )   PT: 14.70 sec;   INR: 1.28 ratio  aPTT: 30.1 sec      Neurologic:   No sedation.    Respiratory:   acute hypoxic respiratory failure due to COVID PNA s/p intubation 4/7  -Extubated 4/12  Monitor O2 sats. ABG if needed     Cardiovascular:   -on Plavix/ASA for h/o PCI/TEMI  -Nifedipine XL 60  - Labetalol 100 q8  -Cardizem ER   - Lasix 40 PO daily     Gastrointestinal/Nutrition:  Carb consistent diet   PPI and Senna, ducloax. BM 4/13     Genitourinary/Renal:   BUN/Cr 46/1.7   No sosa. Pending TOV   Monitor lytes, replete as needed   Encourage PO intake     Hematologic:   Eliquis x3 weeks for new onset A fib   monitor H/H   Monitor Platelets   Continue Eliquis upon DC     Infectious Disease:   COVID-19 PNA - completed course of Plaquenil/Azithromycin and Anakinra x 3days  -procalcitonin 0.14-> 0.18 4/10   -Trend WBC   -Afebrile     Endocrine:   Hyperglycemia- on ISS  FS    HbA1C - 6.8    Follow Up   PT Rehab   Make family aware of changes   Do Not restart metoprolol , continue Labetalol for better control of HTN     Signed out to : Dylan ([id number]) @[phone] on 4/16

## 2020-04-17 LAB
ALBUMIN SERPL ELPH-MCNC: 2.8 G/DL — LOW (ref 3.5–5.2)
ALP SERPL-CCNC: 118 U/L — HIGH (ref 30–115)
ALT FLD-CCNC: 61 U/L — HIGH (ref 0–41)
ANION GAP SERPL CALC-SCNC: 12 MMOL/L — SIGNIFICANT CHANGE UP (ref 7–14)
APTT BLD: 34.1 SEC — SIGNIFICANT CHANGE UP (ref 27–39.2)
AST SERPL-CCNC: 49 U/L — HIGH (ref 0–41)
BASOPHILS # BLD AUTO: 0.01 K/UL — SIGNIFICANT CHANGE UP (ref 0–0.2)
BASOPHILS NFR BLD AUTO: 0.1 % — SIGNIFICANT CHANGE UP (ref 0–1)
BILIRUB DIRECT SERPL-MCNC: 0.2 MG/DL — SIGNIFICANT CHANGE UP (ref 0–0.2)
BILIRUB INDIRECT FLD-MCNC: 0.6 MG/DL — SIGNIFICANT CHANGE UP (ref 0.2–1.2)
BILIRUB SERPL-MCNC: 0.8 MG/DL — SIGNIFICANT CHANGE UP (ref 0.2–1.2)
BUN SERPL-MCNC: 48 MG/DL — HIGH (ref 10–20)
CALCIUM SERPL-MCNC: 7.7 MG/DL — LOW (ref 8.5–10.1)
CHLORIDE SERPL-SCNC: 98 MMOL/L — SIGNIFICANT CHANGE UP (ref 98–110)
CO2 SERPL-SCNC: 28 MMOL/L — SIGNIFICANT CHANGE UP (ref 17–32)
CREAT SERPL-MCNC: 2.7 MG/DL — HIGH (ref 0.7–1.5)
EOSINOPHIL # BLD AUTO: 0.09 K/UL — SIGNIFICANT CHANGE UP (ref 0–0.7)
EOSINOPHIL NFR BLD AUTO: 0.9 % — SIGNIFICANT CHANGE UP (ref 0–8)
GLUCOSE BLDC GLUCOMTR-MCNC: 135 MG/DL — HIGH (ref 70–99)
GLUCOSE BLDC GLUCOMTR-MCNC: 144 MG/DL — HIGH (ref 70–99)
GLUCOSE BLDC GLUCOMTR-MCNC: 160 MG/DL — HIGH (ref 70–99)
GLUCOSE BLDC GLUCOMTR-MCNC: 184 MG/DL — HIGH (ref 70–99)
GLUCOSE SERPL-MCNC: 121 MG/DL — HIGH (ref 70–99)
HCT VFR BLD CALC: 29.1 % — LOW (ref 42–52)
HGB BLD-MCNC: 9.3 G/DL — LOW (ref 14–18)
IMM GRANULOCYTES NFR BLD AUTO: 1 % — HIGH (ref 0.1–0.3)
INR BLD: 1.59 RATIO — HIGH (ref 0.65–1.3)
LYMPHOCYTES # BLD AUTO: 1.03 K/UL — LOW (ref 1.2–3.4)
LYMPHOCYTES # BLD AUTO: 9.7 % — LOW (ref 20.5–51.1)
MAGNESIUM SERPL-MCNC: 2.8 MG/DL — HIGH (ref 1.8–2.4)
MCHC RBC-ENTMCNC: 29.1 PG — SIGNIFICANT CHANGE UP (ref 27–31)
MCHC RBC-ENTMCNC: 32 G/DL — SIGNIFICANT CHANGE UP (ref 32–37)
MCV RBC AUTO: 90.9 FL — SIGNIFICANT CHANGE UP (ref 80–94)
MONOCYTES # BLD AUTO: 0.78 K/UL — HIGH (ref 0.1–0.6)
MONOCYTES NFR BLD AUTO: 7.4 % — SIGNIFICANT CHANGE UP (ref 1.7–9.3)
NEUTROPHILS # BLD AUTO: 8.56 K/UL — HIGH (ref 1.4–6.5)
NEUTROPHILS NFR BLD AUTO: 80.9 % — HIGH (ref 42.2–75.2)
NRBC # BLD: 0 /100 WBCS — SIGNIFICANT CHANGE UP (ref 0–0)
PHOSPHATE SERPL-MCNC: 4.7 MG/DL — SIGNIFICANT CHANGE UP (ref 2.1–4.9)
PLATELET # BLD AUTO: 170 K/UL — SIGNIFICANT CHANGE UP (ref 130–400)
POTASSIUM SERPL-MCNC: 4.5 MMOL/L — SIGNIFICANT CHANGE UP (ref 3.5–5)
POTASSIUM SERPL-SCNC: 4.5 MMOL/L — SIGNIFICANT CHANGE UP (ref 3.5–5)
PROCALCITONIN SERPL-MCNC: 0.32 NG/ML — HIGH (ref 0.02–0.1)
PROT SERPL-MCNC: 5.6 G/DL — LOW (ref 6–8)
PROTHROM AB SERPL-ACNC: 18.3 SEC — HIGH (ref 9.95–12.87)
RBC # BLD: 3.2 M/UL — LOW (ref 4.7–6.1)
RBC # FLD: 14.3 % — SIGNIFICANT CHANGE UP (ref 11.5–14.5)
SODIUM SERPL-SCNC: 138 MMOL/L — SIGNIFICANT CHANGE UP (ref 135–146)
WBC # BLD: 10.58 K/UL — SIGNIFICANT CHANGE UP (ref 4.8–10.8)
WBC # FLD AUTO: 10.58 K/UL — SIGNIFICANT CHANGE UP (ref 4.8–10.8)

## 2020-04-17 PROCEDURE — 99233 SBSQ HOSP IP/OBS HIGH 50: CPT

## 2020-04-17 RX ORDER — LABETALOL HCL 100 MG
100 TABLET ORAL EVERY 12 HOURS
Refills: 0 | Status: DISCONTINUED | OUTPATIENT
Start: 2020-04-17 | End: 2020-04-18

## 2020-04-17 RX ORDER — NIFEDIPINE 30 MG
30 TABLET, EXTENDED RELEASE 24 HR ORAL DAILY
Refills: 0 | Status: DISCONTINUED | OUTPATIENT
Start: 2020-04-18 | End: 2020-04-18

## 2020-04-17 RX ORDER — NYSTATIN CREAM 100000 [USP'U]/G
1 CREAM TOPICAL
Refills: 0 | Status: DISCONTINUED | OUTPATIENT
Start: 2020-04-17 | End: 2020-04-19

## 2020-04-17 RX ADMIN — FAMOTIDINE 20 MILLIGRAM(S): 10 INJECTION INTRAVENOUS at 11:25

## 2020-04-17 RX ADMIN — Medication 81 MILLIGRAM(S): at 11:25

## 2020-04-17 RX ADMIN — Medication 100 MILLIGRAM(S): at 08:18

## 2020-04-17 RX ADMIN — CLOPIDOGREL BISULFATE 75 MILLIGRAM(S): 75 TABLET, FILM COATED ORAL at 11:25

## 2020-04-17 RX ADMIN — Medication 100 MILLIGRAM(S): at 16:57

## 2020-04-17 RX ADMIN — NYSTATIN CREAM 1 APPLICATION(S): 100000 CREAM TOPICAL at 06:33

## 2020-04-17 RX ADMIN — Medication 40 MILLIGRAM(S): at 08:18

## 2020-04-17 RX ADMIN — Medication 120 MILLIGRAM(S): at 08:18

## 2020-04-17 RX ADMIN — NYSTATIN CREAM 1 APPLICATION(S): 100000 CREAM TOPICAL at 17:52

## 2020-04-17 RX ADMIN — ATORVASTATIN CALCIUM 20 MILLIGRAM(S): 80 TABLET, FILM COATED ORAL at 21:13

## 2020-04-17 RX ADMIN — Medication 60 MILLIGRAM(S): at 08:18

## 2020-04-17 RX ADMIN — APIXABAN 5 MILLIGRAM(S): 2.5 TABLET, FILM COATED ORAL at 02:38

## 2020-04-17 RX ADMIN — Medication 2: at 16:56

## 2020-04-17 RX ADMIN — Medication 325 MILLIGRAM(S): at 16:57

## 2020-04-17 RX ADMIN — Medication 4: at 21:12

## 2020-04-17 RX ADMIN — SENNA PLUS 2 TABLET(S): 8.6 TABLET ORAL at 21:13

## 2020-04-17 RX ADMIN — APIXABAN 5 MILLIGRAM(S): 2.5 TABLET, FILM COATED ORAL at 12:40

## 2020-04-17 RX ADMIN — Medication 325 MILLIGRAM(S): at 06:33

## 2020-04-17 NOTE — PROGRESS NOTE ADULT - ATTENDING COMMENTS
I personally examined this patient with the PA and resident and discussed my plan with them.    off cleviprex, tolerating nc with sat in 90s  7.47/43/70/94  continue home meds  made change to labetolol yeseterday to provide stronger treatment of bp, went in to self-limited run of afib yesterday but converted, may need replacement of metoprolol if recurs in afib  crt fluctuating, renal following  downgrade today to floor, medicine
I personally provided over 30 minutes of direct critical care to this patient.  I examined the patient with the PA and resident and discussed my plan with them.    precedex, tolerating off propofol  heparin gtt for afib, increasing for subtheraputic ptt  cleviprex, low dose  7.45/61/101/29/  400/23/40/10, plan to continue to decrease peep today to 8  cont plavix  bernard on ckd, crt improving
I personally provided over 30 minutes of direct critical care to this patient.  I examined the patient with the PA and resident and discussed my plan with them.    pt remains on cleviprex  continues to require nc for saturation over 88  abg today  start labetolol  cont eliquis  requires icu for htn and resp monitoring
I personally provided over 30 minutes of direct critical care to this patient.  I examined the patient with the PA and resident and discussed my plan with them.  7.47/45/62 on 4l nc  cleviprex gtt, cardizem and metoprolol po, lasix 40 po  lasix 40 given yesterday in addition to po, 1L neg  plan fo rpo only today try to keep even, he is net NEG for stay and net DOWEN in weight for stay  bun crt stable baseline  on po ac, restart asa  oob to chair today  possible downgrade tomorrow
Patient seen and examined independently. Agree with resident note.  # COVID positive-- worsening infiltrates with fever-- repeat CXR today is pending and oxygen saturation is low-- patient has been refusing oxygen.    I spoke with son and patient was febrile in AM-- cannot discharge him today at all. on Azithromycin. completed chloroquine.  # hx of CAD s/p stent  #Got one dose of lasix yesterday-- KEEGAN worsenend.  prognosis is guarded. need to keep close watch-- can desaturate suddenly
extubated   saturating well on NC   clears   IS   Lasix   d/c TLC   OOBTC   possible transfer to floor later today
sen with fellow. covid patient. extubated. pulmonary function improved. lethargic. possibly from sedation. on 3L O2. needs ambulation and discharge when more stable.
I was Physically Present for the key portions of the evaluation   I agree with the above History  , Physical examination Assessment and plan   I have Reviewed , Modified or appended where appropriate.  Please check A and P as above   1- KEEGAN on CKD 3/ HTN with episodes of hypotension  hold lasix   decrease nifedipine dose   follow BP  cardio eval  will follow
patient seen examined   agree above note   increase peep 12 fio2 lower 80%    follow ptt on heparin for Afib
Patient seen and examined independently. Agree with resident note.  # COVID positive-- CXR is worsening but patient is still on nasal cannula-- CRP is increasing-- he is so far only on nasal cannula and if worsens -- may need NRV-- and then likely intubation.  # Hypotension--hold antihypertensives and gentle hydration.  I spoke with son and critical CXR findings were related to him.
patient seen examined   agree above note   d/c morphine   start precedex   follow K level   insulin drip follow FS keep 110-180  free water NG Q 4 hrs
patient seen examined feel better   pox 99%   taper oxygen to keep pox > 92 %  prone position   cxr decrease b/l opacity
I personally provided over 30 minutes of direct critical care to this patient.  I examined the patient with the PA and resident and discussed my plan with them.    versed, plan for sat today, will consider transition to precedex  7.43/41/110/27  400/23/40/12  drop rr to 21, peep 12 to 11 to 10 over 4h inc if tolerates  cad s/p stents, cintinuing plavix  htn with relative magalie, continuing cardizem  trop stable  continue tube feeds  bernard on ckd, slightly above baseline, will maintain even fb  heparin gtt for afib, theraputic  stress hyperglycemia in insulin gtt
patient seen examined feel better   worsening hypoxia on 100 % NRB   cxr worsening b/l opacity   anesthesia for intubation   start sedation and nimbex for now   ABG in 1 hrs   cxr postintubation

## 2020-04-17 NOTE — PROGRESS NOTE ADULT - ASSESSMENT
75 yo male with PMH HTN, HLD, CAD s/p stent presents c/o cough 1 week with low grade fevers, found to be hypoxic despite %, admitted to  ICU diagnosed with  Sepsis POA,, Acute Hypoxic, respiratory failure  sec to viral pneumonia, sec to COVID-19, requiring mechanical ventillation. ICU course complicated by  Rapid afib S/p cardizem drip and BB, Hypertensive emergency S/p cleviprex drip, Acute Kidney injury on CKD stage 3, currently rate controlled, on 3L NC, downgraded to medicine.    Acute Hypoxic Respiratory Failure secondary to SARS COV 2   Sepsis on Admission, resolved  - Patient currently on 3L NC SPO2 96%, will titrate O2 down, currently denies any dyspnea, afebrile  - S/p mechanical ventilation extubated 4/12, downgraded from ICU  - s/p Plaquenil azithromycin, and Anakinra (3 day dose)  - Procal: 0.23, D-Dimer 516, CRP: 4.2, trend  - Maintain SPO2>92%  - Tylenol PRN for fever  - PT/OT follow up today    New Onset Atrial Fibrillation  - s/p cardizem gtt  - currently rate controlled on Cardizem  - CHADVASC 5, on Eliquis  - cardio f/u requested by family    Acute Kidney Injury on CKD 3  - creatinine increased from 1.7->2.7  - Patient with TOV today  - Likely due to hypoperfusion BP low over night  - Will hold lasix  - Nifedapine decreased to 30 daily  - trend BMP  - monitor urine output, Strict Is and Os    Hypertension  - currently controlled   - continue Nifedipine, labetalol   - will monitor Bp for, optimal mediation adjustment     H/o CAD S/p PCI  - c/w Aspirin, plavix  - c/w statin    DM II  - FS controlled  - HBAIC 6.8  - monitor FS  - c/w insulin     Full code   DietL dash carb consistent  Dispo: OT/PT f/u

## 2020-04-17 NOTE — PROGRESS NOTE ADULT - SUBJECTIVE AND OBJECTIVE BOX
KEZIA MOYER 74y Male  MRN#: [id number]   CODE STATUS:________      SUBJECTIVE  Patient is a 74y old Male who presents with a chief complaint of COVID-19 Pneumonia (13 Apr 2020 10:06)  Currently admitted to medicine with the primary diagnosis of Pneumonia due to SARS-associated coronavirus    Today is hospital day 22d, and this morning he is resting in  bed, alert and oriented x3, patient currently on 3L NC 96%, he admits to reduced PO fluid intake.  Will work with PT this afternoon. Currently denies any acute sob, cp, abominable discomfort, or palpitations        OBJECTIVE  PAST MEDICAL & SURGICAL HISTORY  CAD (coronary artery disease)  Kidney stones  High cholesterol  HTN (hypertension)  S/P drug eluting coronary stent placement: &gt; 10 years ago    ALLERGIES:  No Known Allergies    MEDICATIONS:  STANDING MEDICATIONS  apixaban 5 milliGRAM(s) Oral every 12 hours  aspirin  chewable 81 milliGRAM(s) Oral daily  atorvastatin 20 milliGRAM(s) Oral at bedtime  bisacodyl Suppository 10 milliGRAM(s) Rectal daily  chlorhexidine 4% Liquid 1 Application(s) Topical <User Schedule>  clopidogrel Tablet 75 milliGRAM(s) Oral daily  diltiazem    milliGRAM(s) Oral daily  famotidine    Tablet 20 milliGRAM(s) Oral daily  insulin lispro (HumaLOG) corrective regimen sliding scale   SubCutaneous Before meals and at bedtime  labetalol 100 milliGRAM(s) Oral every 8 hours  nystatin Powder 1 Application(s) Topical two times a day  senna 2 Tablet(s) Oral at bedtime  sodium bicarbonate 325 milliGRAM(s) Oral two times a day    PRN MEDICATIONS  acetaminophen   Tablet .. 650 milliGRAM(s) Oral every 6 hours PRN      VITAL SIGNS: Last 24 Hours  T(C): 36.8 (17 Apr 2020 06:00), Max: 36.8 (17 Apr 2020 06:00)  T(F): 98.2 (17 Apr 2020 06:00), Max: 98.2 (17 Apr 2020 06:00)  HR: 101 (17 Apr 2020 06:00) (53 - 101)  BP: 100/56 (17 Apr 2020 06:00) (97/52 - 114/56)  BP(mean): 78 (16 Apr 2020 16:00) (71 - 79)  RR: 18 (17 Apr 2020 06:00) (18 - 22)  SpO2: 96% (17 Apr 2020 07:48) (95% - 97%)    LABS:                        9.3    10.58 )-----------( 170      ( 17 Apr 2020 01:16 )             29.1     04-17    138  |  98  |  48<H>  ----------------------------<  121<H>  4.5   |  28  |  2.7<H>    Ca    7.7<L>      17 Apr 2020 01:16  Phos  4.7     04-17  Mg     2.8     04-17    TPro  5.6<L>  /  Alb  2.8<L>  /  TBili  0.8  /  DBili  0.2  /  AST  49<H>  /  ALT  61<H>  /  AlkPhos  118<H>  04-17    PT/INR - ( 17 Apr 2020 01:16 )   PT: 18.30 sec;   INR: 1.59 ratio         PTT - ( 17 Apr 2020 01:16 )  PTT:34.1 sec    ABG - ( 16 Apr 2020 05:04 )  pH, Arterial: 7.47  pH, Blood: x     /  pCO2: 43    /  pO2: 70    / HCO3: 31    / Base Excess: 7.0   /  SaO2: 95                    CARDIAC MARKERS ( 16 Apr 2020 00:20 )  x     / x     / 126 U/L / x     / x          RADIOLOGY:      PHYSICAL EXAM:    GENERAL: NAD, obese  HEENT:  Atraumatic, Normocephalic.   PULMONARY: decreased bibasilar bs, no wheezing  CARDIOVASCULAR: Regular rate, no murmer  GASTROINTESTINAL: Soft, Nontender, Nondistended;  MUSCULOSKELETAL:  2+ Peripheral Pulses, No edema

## 2020-04-17 NOTE — PROGRESS NOTE ADULT - SUBJECTIVE AND OBJECTIVE BOX
Patient is a 74y old  Male who presents with a chief complaint of COVID-19 Pneumonia (13 Apr 2020 10:06)    Patient was seen and examined.  Pt is a transfer from ICU  COVID-19- positive  Afebrile  oxygen sat 96 on 3 lit  no new events    PAST MEDICAL & SURGICAL HISTORY:  CAD (coronary artery disease)  Kidney stones  High cholesterol  HTN (hypertension)  S/P drug eluting coronary stent placement: &gt; 10 years ago    Allergies  No Known Allergies    MEDICATIONS  (STANDING):  apixaban 5 milliGRAM(s) Oral every 12 hours  aspirin  chewable 81 milliGRAM(s) Oral daily  atorvastatin 20 milliGRAM(s) Oral at bedtime  bisacodyl Suppository 10 milliGRAM(s) Rectal daily  chlorhexidine 4% Liquid 1 Application(s) Topical <User Schedule>  clopidogrel Tablet 75 milliGRAM(s) Oral daily  diltiazem    milliGRAM(s) Oral daily  famotidine    Tablet 20 milliGRAM(s) Oral daily  insulin lispro (HumaLOG) corrective regimen sliding scale   SubCutaneous Before meals and at bedtime  labetalol 100 milliGRAM(s) Oral every 8 hours  NIFEdipine XL 60 milliGRAM(s) Oral daily  nystatin Powder 1 Application(s) Topical two times a day  senna 2 Tablet(s) Oral at bedtime  sodium bicarbonate 325 milliGRAM(s) Oral two times a day    MEDICATIONS  (PRN):  acetaminophen   Tablet .. 650 milliGRAM(s) Oral every 6 hours PRN Temp greater or equal to 38C (100.4F)    Vital Signs Last 24 Hrs  T(C): 36.8  T(F): 98.2  HR: 101  BP: 100/56  BP(mean): 78  RR: 18  SpO2: 96%    O/E:  Awake, alert, not in distress.  HEENT: atraumatic, EOMI.  Chest: decreased breath sounds at bases  CVS: SIS2 +, no murmur.  P/A: Soft, BS+  CNS: non focal.  Ext: no edema feet.  Skin: no rash, no ulcers.  All systems reviewed positive findings as above.      POCT Blood Glucose.: 135 mg/dL (17 Apr 2020 08:13)  POCT Blood Glucose.: 236 mg/dL (16 Apr 2020 21:12)  POCT Blood Glucose.: 171 mg/dL (16 Apr 2020 17:19)  POCT Blood Glucose.: 213 mg/dL (16 Apr 2020 11:47)                        9.3<L>  10.58 )-----------( 170      ( 17 Apr 2020 01:16 )             29.1<L>                        9.4<L>  8.91  )-----------( 171      ( 16 Apr 2020 00:20 )             29.0<L>    04-17    138  |  98  |  48<H>  ----------------------------<  121<H>  4.5   |  28  |  2.7<H>  04-16    138  |  99  |  37<H>  ----------------------------<  199<H>  4.2   |  29  |  1.8<H>    Ca    7.7<L>      17 Apr 2020 01:16  Ca    7.7<L>      16 Apr 2020 00:20  Ca    6.4<L>      15 Apr 2020 16:00  Phos  4.7     04-17  Mg     2.8     04-17    TPro  5.6<L>  /  Alb  2.8<L>  /  TBili  0.8  /  DBili  0.2  /  AST  49<H>  /  ALT  61<H>  /  AlkPhos  118<H>  04-17  TPro  5.3<L>  /  Alb  2.6<L>  /  TBili  0.8  /  DBili  x   /  AST  50<H>  /  ALT  59<H>  /  AlkPhos  112  04-16      CARDIAC MARKERS ( 16 Apr 2020 00:20 )  x     / x     / 126 U/L / x     / x          PT/INR - ( 17 Apr 2020 01:16 )   PT: 18.30 sec;   INR: 1.59 ratio         PTT - ( 17 Apr 2020 01:16 )  PTT:34.1 sec

## 2020-04-17 NOTE — PROGRESS NOTE ADULT - SUBJECTIVE AND OBJECTIVE BOX
Nephrology progress note    Patient is seen, events over the last 24 h noted .  labs reviewed    Allergies:  No Known Allergies    Hospital Medications:   MEDICATIONS  (STANDING):  apixaban 5 milliGRAM(s) Oral every 12 hours  aspirin  chewable 81 milliGRAM(s) Oral daily  atorvastatin 20 milliGRAM(s) Oral at bedtime  bisacodyl Suppository 10 milliGRAM(s) Rectal daily  chlorhexidine 4% Liquid 1 Application(s) Topical <User Schedule>  clopidogrel Tablet 75 milliGRAM(s) Oral daily  diltiazem    milliGRAM(s) Oral daily  famotidine    Tablet 20 milliGRAM(s) Oral daily  insulin lispro (HumaLOG) corrective regimen sliding scale   SubCutaneous Before meals and at bedtime  labetalol 100 milliGRAM(s) Oral every 8 hours  NIFEdipine XL 60 milliGRAM(s) Oral daily  nystatin Powder 1 Application(s) Topical two times a day  senna 2 Tablet(s) Oral at bedtime  sodium bicarbonate 325 milliGRAM(s) Oral two times a day        VITALS:  T(F): 98.2 (04-17-20 @ 06:00), Max: 98.2 (04-17-20 @ 06:00)  HR: 101 (04-17-20 @ 06:00)  BP: 100/56 (04-17-20 @ 06:00)  RR: 18 (04-17-20 @ 06:00)  SpO2: 96% (04-17-20 @ 07:48)      04-15 @ 07:01  -  04-16 @ 07:00  --------------------------------------------------------  IN: 238.5 mL / OUT: 0 mL / NET: 238.5 mL    04-16 @ 07:01  -  04-17 @ 07:00  --------------------------------------------------------  IN: 767 mL / OUT: 400 mL / NET: 367 mL          PHYSICAL EXAM:  Constitutional: NAD  Respiratory: CTAB, no wheezes, rales or rhonchi  Cardiovascular: S1, S2, RRR  Gastrointestinal: BS+, soft, NT/ND  Extremities: No peripheral edema  :  No sosa.       LABS:  04-17    138  |  98  |  48<H>  ----------------------------<  121<H>  4.5   |  28  |  2.7<H>    Ca    7.7<L>      17 Apr 2020 01:16  Phos  4.7     04-17  Mg     2.8     04-17    TPro  5.6<L>  /  Alb  2.8<L>  /  TBili  0.8  /  DBili  0.2  /  AST  49<H>  /  ALT  61<H>  /  AlkPhos  118<H>  04-17                          9.3    10.58 )-----------( 170      ( 17 Apr 2020 01:16 )             29.1     Blood Gas Profile - Arterial (04.16.20 @ 05:04)    pH, Arterial: 7.47    pCO2, Arterial: 43 mmHg    pO2, Arterial: 70 mmHg    HCO3, Arterial: 31 mmoL/L    Base Excess, Arterial: 7.0 mmoL/L    Oxygen Saturation, Arterial: 95 %      Urine Studies:      RADIOLOGY & ADDITIONAL STUDIES:  < from: Xray Chest 1 View- PORTABLE-Routine (04.16.20 @ 03:58) >  Impression:      No significant change in bilateral opacities.    < end of copied text > Nephrology progress note    Patient is seen, events over the last 24 h noted .  labs reviewed    Allergies:  No Known Allergies    Hospital Medications:   MEDICATIONS  (STANDING):    apixaban 5 milliGRAM(s) Oral every 12 hours  aspirin  chewable 81 milliGRAM(s) Oral daily  atorvastatin 20 milliGRAM(s) Oral at bedtime  bisacodyl Suppository 10 milliGRAM(s) Rectal daily  chlorhexidine 4% Liquid 1 Application(s) Topical <User Schedule>  clopidogrel Tablet 75 milliGRAM(s) Oral daily  diltiazem    milliGRAM(s) Oral daily  famotidine    Tablet 20 milliGRAM(s) Oral daily  insulin lispro (HumaLOG) corrective regimen sliding scale   SubCutaneous Before meals and at bedtime  labetalol 100 milliGRAM(s) Oral every 8 hours  NIFEdipine XL 60 milliGRAM(s) Oral daily  nystatin Powder 1 Application(s) Topical two times a day  senna 2 Tablet(s) Oral at bedtime  sodium bicarbonate 325 milliGRAM(s) Oral two times a day        VITALS:  T(F): 98.2 (04-17-20 @ 06:00), Max: 98.2 (04-17-20 @ 06:00)  HR: 101 (04-17-20 @ 06:00)  BP: 100/56 (04-17-20 @ 06:00)  RR: 18 (04-17-20 @ 06:00)  SpO2: 96% (04-17-20 @ 07:48)      04-15 @ 07:01  -  04-16 @ 07:00  --------------------------------------------------------  IN: 238.5 mL / OUT: 0 mL / NET: 238.5 mL    04-16 @ 07:01  -  04-17 @ 07:00  --------------------------------------------------------  IN: 767 mL / OUT: 400 mL / NET: 367 mL          PHYSICAL EXAM:  Constitutional: NAD  Respiratory: CTAB, no wheezes, rales or rhonchi  Cardiovascular: S1, S2, RRR  Gastrointestinal: BS+, soft, NT/ND  Extremities: No peripheral edema  :  No sosa.       LABS:  04-17    138  |  98  |  48<H>  ----------------------------<  121<H>  4.5   |  28  |  2.7<H>    Creatinine Trend: 2.7<--, 1.8<--, 1.4<--, 1.6<--, 1.7<--, 1.8<--    Ca    7.7<L>      17 Apr 2020 01:16  Phos  4.7     04-17  Mg     2.8     04-17    TPro  5.6<L>  /  Alb  2.8<L>  /  TBili  0.8  /  DBili  0.2  /  AST  49<H>  /  ALT  61<H>  /  AlkPhos  118<H>  04-17                          9.3    10.58 )-----------( 170      ( 17 Apr 2020 01:16 )             29.1     Blood Gas Profile - Arterial (04.16.20 @ 05:04)    pH, Arterial: 7.47    pCO2, Arterial: 43 mmHg    pO2, Arterial: 70 mmHg    HCO3, Arterial: 31 mmoL/L    Base Excess, Arterial: 7.0 mmoL/L    Oxygen Saturation, Arterial: 95 %      Urine Studies:      RADIOLOGY & ADDITIONAL STUDIES:  < from: Xray Chest 1 View- PORTABLE-Routine (04.16.20 @ 03:58) >  Impression:      No significant change in bilateral opacities.    < end of copied text >

## 2020-04-17 NOTE — PROGRESS NOTE ADULT - ASSESSMENT
73 y/o M with KEEGAN on CKD 3/4 in hospital for hypoxemia, cough, SOB.  PMH CAD s/p PCI, Kidney stones, DLD, HTN, CKD 3/4  COVId +. s/p extubation now on nasal cannula.  nephrology recalled for elevated serum creatinine.    # KEEGAN on CKD 3/4  # creatinine up from baseline. likely ATN due to transient drop in BP to 80s  # please document uop.  # hold lasix for 1 day. encourage po intake  # strict I/O  # BP on lower side. decrease nifedipine xl to 30 mg daily and labetalol to 100 mg q 12 hr.  # corrected Ca, Mg, ph at goal, no binders  # trend creatinine.  # maintain blood sugar levels.  # HR fluctuating between 50s to 100s. needs cardio eval. Family requesting Dr. Francis (Cardiologist)

## 2020-04-18 LAB
GLUCOSE BLDC GLUCOMTR-MCNC: 124 MG/DL — HIGH (ref 70–99)
GLUCOSE BLDC GLUCOMTR-MCNC: 168 MG/DL — HIGH (ref 70–99)
GLUCOSE BLDC GLUCOMTR-MCNC: 174 MG/DL — HIGH (ref 70–99)

## 2020-04-18 PROCEDURE — 99233 SBSQ HOSP IP/OBS HIGH 50: CPT | Mod: CS

## 2020-04-18 RX ADMIN — Medication 2: at 15:46

## 2020-04-18 RX ADMIN — APIXABAN 5 MILLIGRAM(S): 2.5 TABLET, FILM COATED ORAL at 01:36

## 2020-04-18 RX ADMIN — Medication 81 MILLIGRAM(S): at 13:31

## 2020-04-18 RX ADMIN — Medication 100 MILLIGRAM(S): at 05:49

## 2020-04-18 RX ADMIN — NYSTATIN CREAM 1 APPLICATION(S): 100000 CREAM TOPICAL at 05:46

## 2020-04-18 RX ADMIN — APIXABAN 5 MILLIGRAM(S): 2.5 TABLET, FILM COATED ORAL at 13:31

## 2020-04-18 RX ADMIN — Medication 120 MILLIGRAM(S): at 05:49

## 2020-04-18 RX ADMIN — Medication 2: at 08:55

## 2020-04-18 RX ADMIN — CHLORHEXIDINE GLUCONATE 1 APPLICATION(S): 213 SOLUTION TOPICAL at 05:49

## 2020-04-18 RX ADMIN — FAMOTIDINE 20 MILLIGRAM(S): 10 INJECTION INTRAVENOUS at 13:31

## 2020-04-18 RX ADMIN — Medication 325 MILLIGRAM(S): at 05:49

## 2020-04-18 NOTE — CONSULT NOTE ADULT - SUBJECTIVE AND OBJECTIVE BOX
KEZIA MOYER 74y (1945) Male    Patient is a 74y old  Male who presents with a chief complaint of COVID-19 Pneumonia (13 Apr 2020 10:06)      HPI:  73 yo male with PMH HTN, HLD, CAD s/p stent presents c/o cough x 1 week. Pt states he had low grade temp and has had slight decreased appetite, was started on an oral antibiotic by his PMD. Today was having some SOB and Jarrett was called and noted him to be hypoxic. Pt denies any chest pain, palpitations, N/V/D or abdominal pain. Wife also with URI sx. No recent travel.  Pt also found to have elevated Creatinine (26 Mar 2020 01:46)    Cardiology addendum   above reviewed. pt interviewed and examined. in 3A-31A.  Pt currently feels better. no cp no palpitations, no dizziness. no lighheadedness. only feels weak and tired.   is now day 23. was intubated and in ICU.   no h/o afib.     PAST MEDICAL & SURGICAL HISTORY:  CAD (coronary artery disease)  Kidney stones  High cholesterol  HTN (hypertension)  S/P drug eluting coronary stent placement: &gt; 10 years ago      FAMILY HISTORY:      Home Medications:  clopidogrel 75 mg oral tablet: 1 tab(s) orally once a day (30 Mar 2020 10:01)  FEBUXOSTAT  40 MG TABS:  (26 Mar 2020 01:49)  FUROSEMIDE  40 MG TABS:  (26 Mar 2020 01:49)  metoprolol tartrate 25 mg oral tablet: 1 tab(s) orally 2 times a day (30 Mar 2020 10:01)  PIOGLITAZONE HYDROCHLORIDE  30 MG TABS:  (26 Mar 2020 01:49)  ROSUVASTATIN CALCIUM  20 MG TABS:  (26 Mar 2020 01:48)      REVIEW OF SYSTEMS:    CONSTITUTIONAL: ++ weakness, fevers or chills  EYES/ENT: No visual changes;  No vertigo or throat pain   NECK: No pain or stiffness  RESPIRATORY: see HPI  CARDIOVASCULAR: see HPI  GASTROINTESTINAL: No abdominal or epigastric pain. No nausea, vomiting, or hematemesis; No diarrhea or constipation. No melena or hematochezia.  GENITOURINARY: No dysuria, frequency or hematuria  NEUROLOGICAL: No numbness or weakness  SKIN: No itching, rashes    ON EXAM:  Vital Signs Last 24 Hrs  T(C): 36.9 (18 Apr 2020 06:18), Max: 37.1 (17 Apr 2020 19:35)  T(F): 98.4 (18 Apr 2020 06:18), Max: 98.7 (17 Apr 2020 19:35)  HR: 84 (18 Apr 2020 06:18) (63 - 84)  BP: 112/53 (18 Apr 2020 06:18) (106/56 - 117/57)  RR: 18 (18 Apr 2020 06:18) (18 - 18)  SpO2: 93% (18 Apr 2020 06:18) (93% - 96%)    GENERAL: obese. NAD  SKIN: No rashes or lesions  HEAD:  Atraumatic, Normocephalic  EYES:  conjunctiva and sclera clear  ENMT: Moist mucous membranes  NECK: Supple, No JVD  CHEST/LUNG: Decreased air entry b/l. b/l faint crackes at bases..  HEART: S1, S2 appreciated. Rate and Rythm are regular.  ABDOMEN/GI: Soft, Nontender, Nondistended; Bowel sounds present  EXTREMITIES:  no edema  NERVOUS SYSTEM:  Alert & Oriented X3, Good concentration    EKGs    LABS:                            9.3    10.58 )-----------( 170      ( 17 Apr 2020 01:16 )             29.1       CBC Full  -  ( 17 Apr 2020 01:16 )  WBC Count : 10.58 K/uL  RBC Count : 3.20 M/uL  Hemoglobin : 9.3 g/dL  Hematocrit : 29.1 %  Platelet Count - Automated : 170 K/uL  Mean Cell Volume : 90.9 fL  Mean Cell Hemoglobin : 29.1 pg  Mean Cell Hemoglobin Concentration : 32.0 g/dL  Auto Neutrophil # : 8.56 K/uL  Auto Lymphocyte # : 1.03 K/uL  Auto Monocyte # : 0.78 K/uL  Auto Eosinophil # : 0.09 K/uL  Auto Basophil # : 0.01 K/uL  Auto Neutrophil % : 80.9 %  Auto Lymphocyte % : 9.7 %  Auto Monocyte % : 7.4 %  Auto Eosinophil % : 0.9 %  Auto Basophil % : 0.1 %      04-17    138  |  98  |  48<H>  ----------------------------<  121<H>  4.5   |  28  |  2.7<H>    Ca    7.7<L>      17 Apr 2020 01:16  Phos  4.7     04-17  Mg     2.8     04-17    TPro  5.6<L>  /  Alb  2.8<L>  /  TBili  0.8  /  DBili  0.2  /  AST  49<H>  /  ALT  61<H>  /  AlkPhos  118<H>  04-17      MEDICATIONS  (STANDING):  apixaban 5 milliGRAM(s) Oral every 12 hours  aspirin  chewable 81 milliGRAM(s) Oral daily  atorvastatin 20 milliGRAM(s) Oral at bedtime  bisacodyl Suppository 10 milliGRAM(s) Rectal daily  chlorhexidine 4% Liquid 1 Application(s) Topical <User Schedule>  clopidogrel Tablet 75 milliGRAM(s) Oral daily  diltiazem    milliGRAM(s) Oral daily  famotidine    Tablet 20 milliGRAM(s) Oral daily  insulin lispro (HumaLOG) corrective regimen sliding scale   SubCutaneous Before meals and at bedtime  labetalol 100 milliGRAM(s) Oral every 12 hours  NIFEdipine XL 30 milliGRAM(s) Oral daily  nystatin Powder 1 Application(s) Topical two times a day  senna 2 Tablet(s) Oral at bedtime  sodium bicarbonate 325 milliGRAM(s) Oral two times a day    MEDICATIONS  (PRN):  acetaminophen   Tablet .. 650 milliGRAM(s) Oral every 6 hours PRN Temp greater or equal to 38C (100.4F)      04-18-20 @ 08:30

## 2020-04-18 NOTE — CONSULT NOTE ADULT - ASSESSMENT
Admitted 23 days ago approximately, and Treated for respiratory failure secondary to COVID 19 pna  	Continue RX for same. pt on plaquenil, azithromycin and Anakinar              Monitor qtc since it is high.     Found to have repeated episodes of paroxysmal atrial fibrillation, last one a few days ago. also had atrial flutter with variable block.             Currently in sinus rhythm            Suggest continue Eliquis 5 mg bid.             Discontinue Plavix and Only continue Aspirin 81 mg once a day.             Discontinue nifedipine since patient is on Diltiazem 120 mg once a day             Discontinue labetalol for now due to low bp.            Since patient has prolonged qtc would not recommend sotalol or amiodarone for now.             Also due to ckd, propafenone is an issue and hence will not give for now.             Eventual plan to put on monitor as out pt x 30 days and see frequency of afib and then decide on course of action with respect to anti arrhythmics.            If hr goes up then can add toprol xl 25 mg to start.      Anemia from 12.7 to 9.4 during hospitalization.             Watch h/h closely since on eliquis now.            	  KEEGAN on CKD stg 3 likely.             Follows with dr Kleiner as out pt.             Eliquis dose still is 5 mg bid since weight more than 60 kg and age < 81 .    HTN,            BP currently running low. dc labetalol and nifedipine.   	   HLD,            Continue crestor 20 mg     CAD s/p stents x 4 about 10-15 years ago at [facility].            dc plavix since on eliquis now.            continue aspirin and statins.     please recall us prn.,     thank you for the courtesy of your consultation

## 2020-04-18 NOTE — PROGRESS NOTE ADULT - SUBJECTIVE AND OBJECTIVE BOX
KEZIA MOYER  74y  Male      Patient is a 74y old  Male who presents with a chief complaint of covid 19 pna (18 Apr 2020 08:29)      INTERVAL HPI/OVERNIGHT EVENTS:      ******************************* REVIEW OF SYSTEMS:**********************************************    resting in bed. No acute distress.   All other review of systems negative    *********************** VITALS ******************************************    T(F): 98.4 (04-18-20 @ 06:18)  HR: 84 (04-18-20 @ 06:18) (63 - 84)  BP: 112/53 (04-18-20 @ 06:18) (106/56 - 117/57)  RR: 18 (04-18-20 @ 06:18) (18 - 18)  SpO2: 93% (04-18-20 @ 06:18) (93% - 96%)    04-17-20 @ 07:01  -  04-18-20 @ 07:00  --------------------------------------------------------  IN: 0 mL / OUT: 545 mL / NET: -545 mL            04-17-20 @ 07:01  -  04-18-20 @ 07:00  --------------------------------------------------------  IN: 0 mL / OUT: 545 mL / NET: -545 mL        ******************************** PHYSICAL EXAM:**************************************************  GENERAL: NAD    PSYCH: no agitation, baseline mentation  HEENT:     NERVOUS SYSTEM:  Alert & Oriented X3,     PULMONARY: diminished  MCKENZIE,     CARDIOVASCULAR: S1S2 RRR    GI: Soft, NT, ND; BS present.    EXTREMITIES:  2+ Peripheral  edema B/L     LYMPH: No lymphadenopathy noted    SKIN: No rashes or lesions    ******************************************************************************************    **************************** LABS *******************************************************                          9.3    10.58 )-----------( 170      ( 17 Apr 2020 01:16 )             29.1     04-17    138  |  98  |  48<H>  ----------------------------<  121<H>  4.5   |  28  |  2.7<H>    Ca    7.7<L>      17 Apr 2020 01:16  Phos  4.7     04-17  Mg     2.8     04-17    TPro  5.6<L>  /  Alb  2.8<L>  /  TBili  0.8  /  DBili  0.2  /  AST  49<H>  /  ALT  61<H>  /  AlkPhos  118<H>  04-17        PT/INR - ( 17 Apr 2020 01:16 )   PT: 18.30 sec;   INR: 1.59 ratio         PTT - ( 17 Apr 2020 01:16 )  PTT:34.1 sec  Lactate Trend  04-16 @ 00:20 Lactate:0.9   04-14 @ 01:20 Lactate:2.6         CAPILLARY BLOOD GLUCOSE      POCT Blood Glucose.: 124 mg/dL (18 Apr 2020 11:50)          **************************Active Medications *******************************************  No Known Allergies      acetaminophen   Tablet .. 650 milliGRAM(s) Oral every 6 hours PRN  apixaban 5 milliGRAM(s) Oral every 12 hours  aspirin  chewable 81 milliGRAM(s) Oral daily  atorvastatin 20 milliGRAM(s) Oral at bedtime  bisacodyl Suppository 10 milliGRAM(s) Rectal daily  chlorhexidine 4% Liquid 1 Application(s) Topical <User Schedule>  clopidogrel Tablet 75 milliGRAM(s) Oral daily  diltiazem    milliGRAM(s) Oral daily  famotidine    Tablet 20 milliGRAM(s) Oral daily  insulin lispro (HumaLOG) corrective regimen sliding scale   SubCutaneous Before meals and at bedtime  labetalol 100 milliGRAM(s) Oral every 12 hours  nystatin Powder 1 Application(s) Topical two times a day  senna 2 Tablet(s) Oral at bedtime  sodium bicarbonate 325 milliGRAM(s) Oral two times a day      ***************************************************  RADIOLOGY & ADDITIONAL TESTS:    Imaging Personally Reviewed:  [ ] YES  [ ] NO    HEALTH ISSUES - PROBLEM Dx:

## 2020-04-18 NOTE — CHART NOTE - NSCHARTNOTEFT_GEN_A_CORE
Transfer Received.  PMH of CAD, HTN, HLD  Patient denies any complaints. Denies any  pain/SOB  completed course of plaquenil, azithromycin, and anakinra  Extubated 4/12/2020.   Sitting comfortable in supine position in the bed on 3 L of O2 at 94%. Transfer Received.  PMH of CAD, HTN, HLD  Patient denies any complaints. Denies any  pain/SOB  completed course of plaquenil, azithromycin, and anakinra  D-Dimer 516  Extubated 4/12/2020.   Sitting comfortable in supine position in the bed on 3 L of O2 at 94%.

## 2020-04-18 NOTE — PROGRESS NOTE ADULT - ASSESSMENT
73 yo male with PMH HTN, HLD, CAD s/p stent presents c/o cough 1 week with low grade fevers, found to be hypoxic despite %, admitted to  ICU diagnosed with  Sepsis POA,, Acute Hypoxic, respiratory failure  sec to viral pneumonia, sec to COVID-19, requiring mechanical ventillation. ICU course complicated by  Rapid afib S/p cardizem drip and BB, Hypertensive emergency S/p cleviprex drip, Acute Kidney injury on CKD stage 3, currently rate controlled, on 3L NC, downgraded to medicine.    Acute Hypoxic Respiratory Failure secondary to SARS COV 2   Sepsis on Admission, resolved  - Patient currently on 3L NC SPO2 96%, will titrate O2 down,  - S/p mechanical ventilation extubated 4/12, downgraded from ICU  - s/p Plaquenil azithromycin, and Anakinra (3 day dose)  - Procal: 0.23, D-Dimer 516, CRP: 4.2, trend  - Maintain SPO2>92%  - Tylenol PRN for fever  - PT/OT follow up today    New Onset Atrial Fibrillation  - s/p cardizem gtt  - currently rate controlled on Cardizem  - CHADVASC 5, on Eliquis  - cardio EVAL noted.     Acute Kidney Injury on CKD 3  - creatinine increased from 1.7->2.7  - Patient with TOV today  - Likely due to hypoperfusion BP low over night  - Will hold lasix  - Nifedapine decreased to 30 daily  - trend BMP  - monitor urine output, Strict Is and Os    Hypertension  - currently controlled   - continue Nifedipine, labetalol   - will monitor Bp for, optimal mediation adjustment     H/o CAD S/p PCI  - c/w Aspirin, plavix  - c/w statin    DM II  - FS controlled  - HBAIC 6.8  - monitor FS  - c/w insulin     Full code   DietL dash carb consistent  Dispo: OT/PT f/u    #Progress Note Handoff  Pending (specify): KEEGAN //  PT/OT   Family discussion: D/W the patient at bedside.   Disposition: [facility]- E  >> HOME vs STR

## 2020-04-19 ENCOUNTER — TRANSCRIPTION ENCOUNTER (OUTPATIENT)
Age: 75
End: 2020-04-19

## 2020-04-19 VITALS
DIASTOLIC BLOOD PRESSURE: 77 MMHG | HEART RATE: 76 BPM | OXYGEN SATURATION: 83 % | TEMPERATURE: 99 F | SYSTOLIC BLOOD PRESSURE: 129 MMHG | RESPIRATION RATE: 18 BRPM

## 2020-04-19 LAB
ALBUMIN SERPL ELPH-MCNC: 3.1 G/DL — LOW (ref 3.5–5.2)
ALP SERPL-CCNC: 124 U/L — HIGH (ref 30–115)
ALT FLD-CCNC: 65 U/L — HIGH (ref 0–41)
ANION GAP SERPL CALC-SCNC: 15 MMOL/L — HIGH (ref 7–14)
AST SERPL-CCNC: 49 U/L — HIGH (ref 0–41)
BASOPHILS # BLD AUTO: 0.01 K/UL — SIGNIFICANT CHANGE UP (ref 0–0.2)
BASOPHILS NFR BLD AUTO: 0.1 % — SIGNIFICANT CHANGE UP (ref 0–1)
BILIRUB DIRECT SERPL-MCNC: 0.4 MG/DL — HIGH (ref 0–0.2)
BILIRUB INDIRECT FLD-MCNC: 0.8 MG/DL — SIGNIFICANT CHANGE UP (ref 0.2–1.2)
BILIRUB SERPL-MCNC: 1.2 MG/DL — SIGNIFICANT CHANGE UP (ref 0.2–1.2)
BUN SERPL-MCNC: 37 MG/DL — HIGH (ref 10–20)
CALCIUM SERPL-MCNC: 7.8 MG/DL — LOW (ref 8.5–10.1)
CHLORIDE SERPL-SCNC: 96 MMOL/L — LOW (ref 98–110)
CO2 SERPL-SCNC: 28 MMOL/L — SIGNIFICANT CHANGE UP (ref 17–32)
CREAT SERPL-MCNC: 2 MG/DL — HIGH (ref 0.7–1.5)
EOSINOPHIL # BLD AUTO: 0.07 K/UL — SIGNIFICANT CHANGE UP (ref 0–0.7)
EOSINOPHIL NFR BLD AUTO: 0.7 % — SIGNIFICANT CHANGE UP (ref 0–8)
GLUCOSE BLDC GLUCOMTR-MCNC: 186 MG/DL — HIGH (ref 70–99)
GLUCOSE SERPL-MCNC: 120 MG/DL — HIGH (ref 70–99)
HCT VFR BLD CALC: 31.1 % — LOW (ref 42–52)
HGB BLD-MCNC: 9.7 G/DL — LOW (ref 14–18)
IMM GRANULOCYTES NFR BLD AUTO: 0.9 % — HIGH (ref 0.1–0.3)
LDH SERPL L TO P-CCNC: 438 U/L — HIGH (ref 50–242)
LYMPHOCYTES # BLD AUTO: 1.03 K/UL — LOW (ref 1.2–3.4)
LYMPHOCYTES # BLD AUTO: 9.9 % — LOW (ref 20.5–51.1)
MAGNESIUM SERPL-MCNC: 2.5 MG/DL — HIGH (ref 1.8–2.4)
MCHC RBC-ENTMCNC: 29.7 PG — SIGNIFICANT CHANGE UP (ref 27–31)
MCHC RBC-ENTMCNC: 31.2 G/DL — LOW (ref 32–37)
MCV RBC AUTO: 95.1 FL — HIGH (ref 80–94)
MONOCYTES # BLD AUTO: 0.64 K/UL — HIGH (ref 0.1–0.6)
MONOCYTES NFR BLD AUTO: 6.1 % — SIGNIFICANT CHANGE UP (ref 1.7–9.3)
NEUTROPHILS # BLD AUTO: 8.58 K/UL — HIGH (ref 1.4–6.5)
NEUTROPHILS NFR BLD AUTO: 82.3 % — HIGH (ref 42.2–75.2)
NRBC # BLD: 0 /100 WBCS — SIGNIFICANT CHANGE UP (ref 0–0)
PLATELET # BLD AUTO: 180 K/UL — SIGNIFICANT CHANGE UP (ref 130–400)
POTASSIUM SERPL-MCNC: 4.9 MMOL/L — SIGNIFICANT CHANGE UP (ref 3.5–5)
POTASSIUM SERPL-SCNC: 4.9 MMOL/L — SIGNIFICANT CHANGE UP (ref 3.5–5)
PROT SERPL-MCNC: 6.1 G/DL — SIGNIFICANT CHANGE UP (ref 6–8)
RBC # BLD: 3.27 M/UL — LOW (ref 4.7–6.1)
RBC # FLD: 15.2 % — HIGH (ref 11.5–14.5)
SODIUM SERPL-SCNC: 139 MMOL/L — SIGNIFICANT CHANGE UP (ref 135–146)
WBC # BLD: 10.42 K/UL — SIGNIFICANT CHANGE UP (ref 4.8–10.8)
WBC # FLD AUTO: 10.42 K/UL — SIGNIFICANT CHANGE UP (ref 4.8–10.8)

## 2020-04-19 RX ORDER — ASPIRIN/CALCIUM CARB/MAGNESIUM 324 MG
1 TABLET ORAL
Qty: 30 | Refills: 0
Start: 2020-04-19

## 2020-04-19 RX ORDER — APIXABAN 2.5 MG/1
1 TABLET, FILM COATED ORAL
Qty: 60 | Refills: 0
Start: 2020-04-19

## 2020-04-19 RX ORDER — NYSTATIN CREAM 100000 [USP'U]/G
1 CREAM TOPICAL
Qty: 0 | Refills: 0 | DISCHARGE
Start: 2020-04-19

## 2020-04-19 RX ORDER — FUROSEMIDE 40 MG
40 TABLET ORAL
Qty: 30 | Refills: 0
Start: 2020-04-19 | End: 2020-05-18

## 2020-04-19 RX ORDER — SODIUM BICARBONATE 1 MEQ/ML
325 SYRINGE (ML) INTRAVENOUS
Qty: 19500 | Refills: 0
Start: 2020-04-19 | End: 2020-05-18

## 2020-04-19 RX ORDER — ROSUVASTATIN CALCIUM 5 MG/1
0 TABLET ORAL
Qty: 90 | Refills: 0 | DISCHARGE

## 2020-04-19 RX ORDER — PIOGLITAZONE HYDROCHLORIDE 15 MG/1
0 TABLET ORAL
Qty: 90 | Refills: 0 | DISCHARGE

## 2020-04-19 RX ORDER — PIOGLITAZONE HYDROCHLORIDE 15 MG/1
30 TABLET ORAL
Qty: 1800 | Refills: 0
Start: 2020-04-19 | End: 2020-05-18

## 2020-04-19 RX ORDER — FEBUXOSTAT 40 MG/1
0 TABLET ORAL
Qty: 90 | Refills: 0 | DISCHARGE

## 2020-04-19 RX ORDER — FUROSEMIDE 40 MG
0 TABLET ORAL
Qty: 90 | Refills: 0 | DISCHARGE

## 2020-04-19 RX ORDER — FEBUXOSTAT 40 MG/1
40 TABLET ORAL
Qty: 30 | Refills: 0
Start: 2020-04-19 | End: 2020-05-18

## 2020-04-19 RX ORDER — ROSUVASTATIN CALCIUM 5 MG/1
20 TABLET ORAL
Qty: 30 | Refills: 0
Start: 2020-04-19 | End: 2020-05-18

## 2020-04-19 RX ADMIN — Medication 4: at 09:39

## 2020-04-19 NOTE — DISCHARGE NOTE NURSING/CASE MANAGEMENT/SOCIAL WORK - PATIENT PORTAL LINK FT
You can access the FollowMyHealth Patient Portal offered by [facility] by registering at the following website: [email or web address]. By joining [employer] [facility]’s FollowMyHealth portal, you will also be able to view your health information using other applications (apps) compatible with our system.

## 2020-04-20 LAB
FERRITIN SERPL-MCNC: 750 NG/ML — HIGH (ref 30–400)
GLUCOSE BLDC GLUCOMTR-MCNC: 162 MG/DL — HIGH (ref 70–99)

## 2020-04-28 DIAGNOSIS — I48.92 UNSPECIFIED ATRIAL FLUTTER: ICD-10-CM

## 2020-04-28 DIAGNOSIS — I25.10 ATHEROSCLEROTIC HEART DISEASE OF NATIVE CORONARY ARTERY WITHOUT ANGINA PECTORIS: ICD-10-CM

## 2020-04-28 DIAGNOSIS — E78.5 HYPERLIPIDEMIA, UNSPECIFIED: ICD-10-CM

## 2020-04-28 DIAGNOSIS — R06.02 SHORTNESS OF BREATH: ICD-10-CM

## 2020-04-28 DIAGNOSIS — N18.4 CHRONIC KIDNEY DISEASE, STAGE 4 (SEVERE): ICD-10-CM

## 2020-04-28 DIAGNOSIS — N17.0 ACUTE KIDNEY FAILURE WITH TUBULAR NECROSIS: ICD-10-CM

## 2020-04-28 DIAGNOSIS — I12.9 HYPERTENSIVE CHRONIC KIDNEY DISEASE WITH STAGE 1 THROUGH STAGE 4 CHRONIC KIDNEY DISEASE, OR UNSPECIFIED CHRONIC KIDNEY DISEASE: ICD-10-CM

## 2020-04-28 DIAGNOSIS — J12.89 OTHER VIRAL PNEUMONIA: ICD-10-CM

## 2020-04-28 DIAGNOSIS — N17.9 ACUTE KIDNEY FAILURE, UNSPECIFIED: ICD-10-CM

## 2020-04-28 DIAGNOSIS — R00.1 BRADYCARDIA, UNSPECIFIED: ICD-10-CM

## 2020-04-28 DIAGNOSIS — E11.65 TYPE 2 DIABETES MELLITUS WITH HYPERGLYCEMIA: ICD-10-CM

## 2020-04-28 DIAGNOSIS — Z79.84 LONG TERM (CURRENT) USE OF ORAL HYPOGLYCEMIC DRUGS: ICD-10-CM

## 2020-04-28 DIAGNOSIS — I95.9 HYPOTENSION, UNSPECIFIED: ICD-10-CM

## 2020-04-28 DIAGNOSIS — E87.2 ACIDOSIS: ICD-10-CM

## 2020-04-28 DIAGNOSIS — R63.8 OTHER SYMPTOMS AND SIGNS CONCERNING FOOD AND FLUID INTAKE: ICD-10-CM

## 2020-04-28 DIAGNOSIS — Z95.5 PRESENCE OF CORONARY ANGIOPLASTY IMPLANT AND GRAFT: ICD-10-CM

## 2020-04-28 DIAGNOSIS — E87.1 HYPO-OSMOLALITY AND HYPONATREMIA: ICD-10-CM

## 2020-04-28 DIAGNOSIS — R94.31 ABNORMAL ELECTROCARDIOGRAM [ECG] [EKG]: ICD-10-CM

## 2020-04-28 DIAGNOSIS — D64.9 ANEMIA, UNSPECIFIED: ICD-10-CM

## 2020-04-28 DIAGNOSIS — I48.0 PAROXYSMAL ATRIAL FIBRILLATION: ICD-10-CM

## 2020-04-28 DIAGNOSIS — U07.1 COVID-19: ICD-10-CM

## 2020-04-28 DIAGNOSIS — J96.21 ACUTE AND CHRONIC RESPIRATORY FAILURE WITH HYPOXIA: ICD-10-CM

## 2020-04-28 DIAGNOSIS — R74.0 NONSPECIFIC ELEVATION OF LEVELS OF TRANSAMINASE AND LACTIC ACID DEHYDROGENASE [LDH]: ICD-10-CM

## 2020-04-28 DIAGNOSIS — J96.01 ACUTE RESPIRATORY FAILURE WITH HYPOXIA: ICD-10-CM

## 2020-04-28 DIAGNOSIS — A41.89 OTHER SPECIFIED SEPSIS: ICD-10-CM

## 2020-04-28 DIAGNOSIS — E86.0 DEHYDRATION: ICD-10-CM

## 2020-08-27 NOTE — PROGRESS NOTE ADULT - SUBJECTIVE AND OBJECTIVE BOX
Occupational Therapy    [facility] ACUTE CARE OCCUPATIONAL THERAPY EVALUATION  Daphne Cruz, 1946, [id number], 8/27/2020    History  [place]:  The primary encounter diagnosis was Paroxysmal atrial fibrillation ([address] Diagnoses of Pre-syncope, Chest pain, unspecified type, and Neck pain were also pertinent to this visit. Patient  has a past medical history of Abnormal MRI, spine, Arthritis, Gastritis, H/O 24 hour EKG monitoring, H/O cardiovascular stress test, H/O complete electrocardiogram, H/O CT scan of chest, H/O Doppler ultrasound, H/O Doppler ultrasound, H/O echocardiogram, H/O tilt table evaluation, Hiatal hernia, Hx of cardiac cath, Hypertension, Prolonged emergence from general anesthesia, Syncope and collapse, Unspecified sleep apnea, and Wears glasses. Patient  has a past surgical history that includes hernia repair; Diagnostic Cardiac Cath Lab Procedure (03/17/1999,11/19/2008); Tonsillectomy; and back surgery. Subjective:  Patient states:  \"I've been getting up and walking\"  Pain:  No.    Communication with other providers:  Handoff to RN, co-eval with PT.    Restrictions: General Precautions, Fall Risk    Home Setup/Prior level of function  Social/Functional History  Lives With: Spouse  Type of Home: House  Home Layout: One level  Home Access: Stairs to enter without rails  Entrance Stairs - Number of Steps: 1  Bathroom Shower/Tub: Tub/Shower unit, Shower chair with back  Bathroom Toilet: Standard  Bathroom Accessibility: Accessible  Home Equipment: (no AD)  ADL Assistance: Independent  Homemaking Assistance: Independent  Homemaking Responsibilities: Yes  Ambulation Assistance: Independent  Transfer Assistance: Independent  Active : Yes  Mode of Transportation: Car  Occupation: Retired  Type of occupation:   Additional Comments: Pt reports no recent falls    Examination of body systems (includes body structures/functions, activity/participation Nephrology progress note    Patient was seen and examined, events over the last 24 h noted .  Cr improving    Allergies:  No Known Allergies    Hospital Medications:   MEDICATIONS  (STANDING):  atorvastatin 40 milliGRAM(s) Oral at bedtime  clopidogrel Tablet 75 milliGRAM(s) Oral daily  hydroxychloroquine   Oral   hydroxychloroquine 200 milliGRAM(s) Oral every 12 hours  metoprolol tartrate 25 milliGRAM(s) Oral two times a day  sodium bicarbonate 325 milliGRAM(s) Oral two times a day  sodium chloride 0.9%. 1000 milliLiter(s) (75 mL/Hr) IV Continuous <Continuous>        VITALS:  T(F): 100.1 (20 @ 11:56), Max: 100.1 (20 @ 11:56)  HR: 66 (20 @ 11:56)  BP: 99/66 (20 @ 11:56)  RR: 18 (20 @ 11:56)  SpO2: 95% (20 @ 11:56)  Wt(kg): --     @ 07:01  -   @ 07:00  --------------------------------------------------------  IN: 495 mL / OUT: 1610 mL / NET: -1115 mL     @ 07:01  -   @ 12:34  --------------------------------------------------------  IN: 0 mL / OUT: 700 mL / NET: -700 mL          PHYSICAL EXAM:  Constitutional: NAD  HEENT: anicteric sclera, oropharynx clear, MMM  Neck: No JVD  Respiratory:  on NC 5 L  Cardiovascular: S1, S2, RRR  Gastrointestinal: BS+, soft, NT/ND  Extremities: No cyanosis or clubbing. No peripheral edema  :  No sosa.   Skin: No rashes    LABS:      140  |  105  |  59<H>  ----------------------------<  107<H>  3.7   |  23  |  3.0<H>    Ca    8.0<L>      27 Mar 2020 06:39  Mg     1.9         TPro  5.9<L>  /  Alb  3.1<L>  /  TBili  0.7  /  DBili  0.3<H>  /  AST  109<H>  /  ALT  51<H>  /  AlkPhos  101                            12.6   7.48  )-----------( 202      ( 27 Mar 2020 06:39 )             38.1       Urine Studies:  Urinalysis Basic - ( 26 Mar 2020 20:35 )    Color: Light Yellow / Appearance: Clear / S.018 / pH:   Gluc:  / Ketone: Negative  / Bili: Negative / Urobili: <2 mg/dL   Blood:  / Protein: Negative / Nitrite: Negative   Leuk Esterase: Negative / RBC:  / WBC    Sq Epi:  / Non Sq Epi:  / Bacteria:       Sodium, Random Urine: 44.0 mmoL/L ( @ 20:35)  Protein/Creatinine Ratio Calculation: 0.1 Ratio ( @ 20:35)  Creatinine, Random Urine: 36 mg/dL ( @ 20:35)    RADIOLOGY & ADDITIONAL STUDIES: limitations):  · Observation:  Sitting upright in chair upon arrival  · Vision:  Glasses  · Hearing:  [facility] [place]  · Cardiopulmonary:  No 02 needs      Body Systems and functions:  · ROM R/L:  WFL. · Strength R/L:  5/5,   · Sensation: WFL  · Tone: Normal  · Coordination: WFL  · Perception: WNL    Activities of Daily Living (ADLs):  · Feeding: Independent  · Grooming: Independent  · UB bathing: Independent  · LB bathing: Independent  · UB dressing: Independent  · LB dressing: Independent  · Toileting: Independent    Cognitive and Psychosocial Functioning:  · Overall cognitive status: WNL  · Affect: Normal        Mobility:  · Supine to sit:  DNT pt received sitting upright in chair  · Transfers: Independent  · Sitting balance:  Independent. · Standing balance:  Independent. · Functional Mobility: Independent ~400 feet  · Toilet/Shower Transfers: DNT             AM-PAC Daily Activity Inpatient   How much help for putting on and taking off regular lower body clothing?: None  How much help for Bathing?: None  How much help for Toileting?: None  How much help for putting on and taking off regular upper body clothing?: None  How much help for taking care of personal grooming?: None  How much help for eating meals?: None  AM-PAC Inpatient Daily Activity Raw Score: 24  AM-PAC Inpatient ADL T-Scale Score : 57.54  ADL Inpatient CMS 0-100% Score: 0  ADL Inpatient CMS G-Code Modifier : CH    Treatment:    Safety: patient left in chair with chair alarm, call light within reach, RN notified, gait belt used. Assessment:  Pt is a 70 yo male admitted from home for new onset a-fib. Pt at baseline is Independent for ADLs Independent for high level IADLs and Independent for functional transfers/mobility. Pt currently presents at baseline Independent level and is safe to discharge home independently when medically appropriate. Complexity: Low  Prognosis: Good, no significant barriers to participation at this time.    Plan  Times per week: eval and discharge     Equipment: defer    Goals:  Pt goal: go home  Eval and discharge    Recommendations for NURSING activity: Up to chair for all 3 meals and up to standard commode for all toileting needs    Time:   Time in: 0943  Time out: 0952  Timed treatment minutes: 0 minutes  Total time: 9 minutes    Electronically signed by:    Scarlett DANGELO/L [id number]  11:37 AM,8/27/2020

## 2020-09-08 ENCOUNTER — RESULT REVIEW (OUTPATIENT)
Age: 75
End: 2020-09-08

## 2020-09-08 ENCOUNTER — OUTPATIENT (OUTPATIENT)
Dept: OUTPATIENT SERVICES | Facility: HOSPITAL | Age: 75
LOS: 1 days | Discharge: HOME | End: 2020-09-08
Payer: MEDICARE

## 2020-09-08 DIAGNOSIS — R05 COUGH: ICD-10-CM

## 2020-09-08 DIAGNOSIS — Z95.5 PRESENCE OF CORONARY ANGIOPLASTY IMPLANT AND GRAFT: Chronic | ICD-10-CM

## 2020-09-08 PROCEDURE — 71046 X-RAY EXAM CHEST 2 VIEWS: CPT | Mod: 26

## 2020-09-09 PROBLEM — I25.10 ATHEROSCLEROTIC HEART DISEASE OF NATIVE CORONARY ARTERY WITHOUT ANGINA PECTORIS: Chronic | Status: ACTIVE | Noted: 2020-03-26

## 2021-05-04 ENCOUNTER — INPATIENT (INPATIENT)
Facility: HOSPITAL | Age: 76
LOS: 2 days | Discharge: HOME | End: 2021-05-07
Attending: HOSPITALIST | Admitting: HOSPITALIST
Payer: MEDICARE

## 2021-05-04 VITALS
RESPIRATION RATE: 19 BRPM | DIASTOLIC BLOOD PRESSURE: 72 MMHG | HEIGHT: 60.8 IN | SYSTOLIC BLOOD PRESSURE: 173 MMHG | OXYGEN SATURATION: 100 % | WEIGHT: 207.9 LBS | TEMPERATURE: 97 F | HEART RATE: 50 BPM

## 2021-05-04 DIAGNOSIS — Z95.5 PRESENCE OF CORONARY ANGIOPLASTY IMPLANT AND GRAFT: Chronic | ICD-10-CM

## 2021-05-04 LAB
ALBUMIN SERPL ELPH-MCNC: 4 G/DL — SIGNIFICANT CHANGE UP (ref 3.5–5.2)
ALP SERPL-CCNC: 121 U/L — HIGH (ref 30–115)
ALT FLD-CCNC: 15 U/L — SIGNIFICANT CHANGE UP (ref 0–41)
ANION GAP SERPL CALC-SCNC: 14 MMOL/L — SIGNIFICANT CHANGE UP (ref 7–14)
APTT BLD: 37.9 SEC — SIGNIFICANT CHANGE UP (ref 27–39.2)
AST SERPL-CCNC: 25 U/L — SIGNIFICANT CHANGE UP (ref 0–41)
BASOPHILS # BLD AUTO: 0.02 K/UL — SIGNIFICANT CHANGE UP (ref 0–0.2)
BASOPHILS NFR BLD AUTO: 0.2 % — SIGNIFICANT CHANGE UP (ref 0–1)
BILIRUB SERPL-MCNC: 0.7 MG/DL — SIGNIFICANT CHANGE UP (ref 0.2–1.2)
BLD GP AB SCN SERPL QL: SIGNIFICANT CHANGE UP
BUN SERPL-MCNC: 67 MG/DL — CRITICAL HIGH (ref 10–20)
CALCIUM SERPL-MCNC: 8.2 MG/DL — LOW (ref 8.5–10.1)
CHLORIDE SERPL-SCNC: 103 MMOL/L — SIGNIFICANT CHANGE UP (ref 98–110)
CO2 SERPL-SCNC: 23 MMOL/L — SIGNIFICANT CHANGE UP (ref 17–32)
CREAT SERPL-MCNC: 3.7 MG/DL — HIGH (ref 0.7–1.5)
EOSINOPHIL # BLD AUTO: 0.11 K/UL — SIGNIFICANT CHANGE UP (ref 0–0.7)
EOSINOPHIL NFR BLD AUTO: 1.1 % — SIGNIFICANT CHANGE UP (ref 0–8)
GLUCOSE SERPL-MCNC: 97 MG/DL — SIGNIFICANT CHANGE UP (ref 70–99)
HCT VFR BLD CALC: 24.5 % — LOW (ref 42–52)
HGB BLD-MCNC: 7.3 G/DL — LOW (ref 14–18)
IMM GRANULOCYTES NFR BLD AUTO: 0.5 % — HIGH (ref 0.1–0.3)
INR BLD: 1.9 RATIO — HIGH (ref 0.65–1.3)
LYMPHOCYTES # BLD AUTO: 2.69 K/UL — SIGNIFICANT CHANGE UP (ref 1.2–3.4)
LYMPHOCYTES # BLD AUTO: 26.6 % — SIGNIFICANT CHANGE UP (ref 20.5–51.1)
MCHC RBC-ENTMCNC: 29.2 PG — SIGNIFICANT CHANGE UP (ref 27–31)
MCHC RBC-ENTMCNC: 29.8 G/DL — LOW (ref 32–37)
MCV RBC AUTO: 98 FL — HIGH (ref 80–94)
MONOCYTES # BLD AUTO: 1.29 K/UL — HIGH (ref 0.1–0.6)
MONOCYTES NFR BLD AUTO: 12.7 % — HIGH (ref 1.7–9.3)
NEUTROPHILS # BLD AUTO: 5.96 K/UL — SIGNIFICANT CHANGE UP (ref 1.4–6.5)
NEUTROPHILS NFR BLD AUTO: 58.9 % — SIGNIFICANT CHANGE UP (ref 42.2–75.2)
NRBC # BLD: 0 /100 WBCS — SIGNIFICANT CHANGE UP (ref 0–0)
PLATELET # BLD AUTO: 240 K/UL — SIGNIFICANT CHANGE UP (ref 130–400)
POTASSIUM SERPL-MCNC: 4.2 MMOL/L — SIGNIFICANT CHANGE UP (ref 3.5–5)
POTASSIUM SERPL-SCNC: 4.2 MMOL/L — SIGNIFICANT CHANGE UP (ref 3.5–5)
PROT SERPL-MCNC: 5.9 G/DL — LOW (ref 6–8)
PROTHROM AB SERPL-ACNC: 21.8 SEC — HIGH (ref 9.95–12.87)
RBC # BLD: 2.5 M/UL — LOW (ref 4.7–6.1)
RBC # FLD: 17.1 % — HIGH (ref 11.5–14.5)
SARS-COV-2 RNA SPEC QL NAA+PROBE: SIGNIFICANT CHANGE UP
SODIUM SERPL-SCNC: 140 MMOL/L — SIGNIFICANT CHANGE UP (ref 135–146)
WBC # BLD: 10.12 K/UL — SIGNIFICANT CHANGE UP (ref 4.8–10.8)
WBC # FLD AUTO: 10.12 K/UL — SIGNIFICANT CHANGE UP (ref 4.8–10.8)

## 2021-05-04 PROCEDURE — 99285 EMERGENCY DEPT VISIT HI MDM: CPT | Mod: CS

## 2021-05-04 PROCEDURE — 93010 ELECTROCARDIOGRAM REPORT: CPT

## 2021-05-04 RX ORDER — SODIUM CHLORIDE 9 MG/ML
1000 INJECTION INTRAMUSCULAR; INTRAVENOUS; SUBCUTANEOUS ONCE
Refills: 0 | Status: COMPLETED | OUTPATIENT
Start: 2021-05-04 | End: 2021-05-04

## 2021-05-04 RX ORDER — CHLORHEXIDINE GLUCONATE 213 G/1000ML
1 SOLUTION TOPICAL
Refills: 0 | Status: DISCONTINUED | OUTPATIENT
Start: 2021-05-04 | End: 2021-05-07

## 2021-05-04 RX ORDER — PANTOPRAZOLE SODIUM 20 MG/1
40 TABLET, DELAYED RELEASE ORAL ONCE
Refills: 0 | Status: COMPLETED | OUTPATIENT
Start: 2021-05-04 | End: 2021-05-04

## 2021-05-04 RX ORDER — ASPIRIN/CALCIUM CARB/MAGNESIUM 324 MG
81 TABLET ORAL DAILY
Refills: 0 | Status: DISCONTINUED | OUTPATIENT
Start: 2021-05-04 | End: 2021-05-05

## 2021-05-04 RX ORDER — ATORVASTATIN CALCIUM 80 MG/1
80 TABLET, FILM COATED ORAL AT BEDTIME
Refills: 0 | Status: DISCONTINUED | OUTPATIENT
Start: 2021-05-04 | End: 2021-05-07

## 2021-05-04 RX ORDER — PANTOPRAZOLE SODIUM 20 MG/1
40 TABLET, DELAYED RELEASE ORAL
Refills: 0 | Status: DISCONTINUED | OUTPATIENT
Start: 2021-05-04 | End: 2021-05-07

## 2021-05-04 RX ORDER — METOPROLOL TARTRATE 50 MG
25 TABLET ORAL
Refills: 0 | Status: DISCONTINUED | OUTPATIENT
Start: 2021-05-04 | End: 2021-05-07

## 2021-05-04 RX ORDER — FERROUS SULFATE 325(65) MG
325 TABLET ORAL
Refills: 0 | Status: DISCONTINUED | OUTPATIENT
Start: 2021-05-04 | End: 2021-05-05

## 2021-05-04 RX ORDER — FUROSEMIDE 40 MG
40 TABLET ORAL DAILY
Refills: 0 | Status: DISCONTINUED | OUTPATIENT
Start: 2021-05-04 | End: 2021-05-05

## 2021-05-04 RX ORDER — FOLIC ACID 0.8 MG
1 TABLET ORAL DAILY
Refills: 0 | Status: DISCONTINUED | OUTPATIENT
Start: 2021-05-04 | End: 2021-05-07

## 2021-05-04 RX ADMIN — PANTOPRAZOLE SODIUM 40 MILLIGRAM(S): 20 TABLET, DELAYED RELEASE ORAL at 20:27

## 2021-05-04 RX ADMIN — SODIUM CHLORIDE 2000 MILLILITER(S): 9 INJECTION INTRAMUSCULAR; INTRAVENOUS; SUBCUTANEOUS at 19:47

## 2021-05-04 NOTE — ED PROVIDER NOTE - CLINICAL SUMMARY MEDICAL DECISION MAKING FREE TEXT BOX
Labs with Hb 7, BUN/Cr 67/3.7; significant increase from prior, also concerned with disproportionately elevated BUN, could be suggestive of UGIB. Pt remains hemodynamically stable, will transfuse, give protonix, admit.

## 2021-05-04 NOTE — ED PROVIDER NOTE - NS ED ROS FT
Review of Systems:  •	CONSTITUTIONAL - No fever, No diaphoresis, No weight change  •	SKIN - No rash  •	HEMATOLOGIC - No abnormal bleeding or bruising  •	EYES - No eye pain, No blurred vision  •	ENT - No change in hearing, No sore throat, No neck pain, No rhinorrhea, No ear pain  •	RESPIRATORY - No shortness of breath, No cough  •	CARDIAC -No chest pain, No palpitations  •	GI - No abdominal pain, No nausea, No vomiting, No diarrhea, No constipation, No bright red blood per rectum, + black stool. No flank pain  •                 - No dysuria, frequency, hematuria.   •	ENDO - No polydypsia, No polyuria, No heat/cold intolerance  •	MUSCULOSKELETAL - No joint paint, No swelling, No back pain  •	NEUROLOGIC - No numbness, No focal weakness, No headache, + dizziness  All other systems negative, unless specified in HPI

## 2021-05-04 NOTE — H&P ADULT - HISTORY OF PRESENT ILLNESS
75 years old male with PMHx of HTN, HLD, CAD s/p stents, CKD 4, Paroxysmal atrial fibrillation on Eliquis c/o Hgb of 7.0 from PMD office.   Patient was feeling dizzy/lightheaded for past couple of days, no LOC. Went to PMD Dr. Kleiner's office, got bloodwork, today Hgb result came back 7.0. Patient has chronic dark stool due to iron pill, did not notice any change in stool color or bright red blood. Last BM this morning and stool was normal. Denies hematuria, hematemesis or hemoptysis. Was admitted in April 2020 for COVID PNA, hospital course complicated with New onset afibb, started on Eliquis.   Denies fever, chills, chest pain, SOB, abdominal pain, n/v. Never had EGD. Last colonoscopy a couple of years ago by Dr. Hand, and was normal.    In the ED, Temp 97, /72, HR 50, RR 19 saturating well on room air, labs showed Hb Hb 7.3, MCV 98, INR 1.90, Cr 3.7. Pt received Protonix IV in ED. To be admitted under medicine for further workup  75 years old male with PMHx of HTN, HLD, T2DM, CAD s/p stents, CKD 4, Paroxysmal atrial fibrillation on Eliquis c/o Hgb of 7.0 from PMD office.   Patient was feeling dizzy/lightheaded for past couple of days, no LOC. Went to PMD Dr. Kleiner's office, got bloodwork, today Hgb result came back 7.0. Patient has chronic dark stool due to iron pill, did not notice any change in stool color or bright red blood. Last BM this morning and stool was normal. Denies hematuria, hematemesis or hemoptysis. Was admitted in April 2020 for COVID PNA, hospital course complicated with New onset afibb, started on Eliquis.   Denies fever, chills, chest pain, SOB, abdominal pain, n/v. Never had EGD. Last colonoscopy a couple of years ago by Dr. Hand, and was normal.    In the ED, Temp 97, /72, HR 50, RR 19 saturating well on room air, labs showed Hb Hb 7.3, MCV 98, INR 1.90, Cr 3.7. Pt received Protonix IV in ED. To be admitted under medicine for further workup

## 2021-05-04 NOTE — ED PROVIDER NOTE - PHYSICAL EXAMINATION
CONSTITUTIONAL: Well-developed; well-nourished; in no acute distress.   SKIN: warm, dry  HEAD: Normocephalic; atraumatic.  EYES: no conjunctival injection. PERRL.   ENT: No nasal discharge; airway clear.  NECK: Supple; non tender.  CARD: S1, S2 normal; no murmurs, gallops, or rubs. Sinus bradycardia, regular rhythm.  RESP: No wheezes, rales or rhonchi.  ABD: soft ntnd  RECTAL: dark stool, no red blood  EXT: Normal ROM.  No clubbing, cyanosis or edema.  LYMPH: No acute cervical adenopathy.  NEURO: Alert, oriented, grossly unremarkable  PSYCH: Cooperative, appropriate.

## 2021-05-04 NOTE — ED PROVIDER NOTE - OBJECTIVE STATEMENT
Patient is a 74 yo male with PMHx of HTN, HLD, CAD s/p stents, CKD, on Eliquis c/o Hgb of 7.0 from PMD office. Patient was feeling dizzy/lightheaded for past couple of days, no LOC. Went to PMD Dr. Kleiner's office, got bloodwork, today Hgb result came back 7.0. Patient has chronic dark stool due to iron pill, did not notice any change in stool color or bright red blood. Last BM this morning and stool was normal. Denies hematuria, hematemesis or hemoptysis. Denies fever, chills, chest pain, SOB, abdominal pain, n/v. Never had EGD. Last colonoscopy a couple of years ago by Dr. Hand, and was normal.

## 2021-05-04 NOTE — H&P ADULT - ATTENDING COMMENTS
HPI:  75 year old gentleman with a PMHx of HTN, HLD, T2DM, CAD s/p stents, CKD 4, Paroxysmal atrial fibrillation on Eliquis c/o Hgb of 7.0 from PMD office. Outpatient labwork with Dr ariza reported a drop in his hemoglobin. he has been taking iron and having dark stool since starting it. Over the last 10 days ne noted increased dizziness and fatigue. his wife also noted he was pale. He denied any chest pain, palpitations, LOC, hematochezia, hematemesis, coffee ground emesis, abdominal pain, GERD, or n/v/d. His last colonoscopy was ~5-6 years ago with Dr. Capps. he doesn't think he's ever had an endoscopy despite being hold he had an ulcer years ago.     OF NOTE: He and his wife notes that occasionally he coughs while eating. He reports sometimes he will have a little difficulty swallowing but it isn't persistent.     REVIEW OF SYSTEMS:  CONSTITUTIONAL:  + fatigue, No weakness, fevers, chills, night sweats, weight loss  EYES/ENT: No visual changes. + vertigo, dysphagia  NECK: No neck pain or stiffness  RESPIRATORY: No cough, wheezing, hemoptysis. No shortness of breath  CARDIOVASCULAR: No chest pain or palpitations. No lower extremity edema  GASTROINTESTINAL: No abdominal pain. No nausea, vomiting, diarrhea, or hematemesis  GENITOURINARY: No dysuria or hematuria   NEUROLOGICAL: No focal numbness or weakness  SKIN: No rashes or itching  HEMATOLOGIC: No easy bruising or prolonged bleeding.      PHYSICAL EXAM:  GENERAL: NAD, well-developed, Non-toxic, stated age   HEAD:  Atraumatic, Normocephalic  EYES: EOMI, Sclera White   NECK: Supple, No JVD  CHEST/LUNG: Clear to auscultation bilaterally; No wheezing, rhonchi, or crackles  HEART: Regular rate and rhythm; s1, s2, No murmurs, rubs, or gallops  ABDOMEN: Soft, Nontender, Nondistended; Bowel sounds present, No rebound or guarding noted   EXTREMITIES:  +1 pitting lower extremity edema. No calf tenderness to palpation.  No clubbing or cyanosis  PSYCH: AAOx3, pleasant, cooperative, not anxious  NEUROLOGY: non-focal  SKIN: No rashes or lesions. stasis dermatitis on b/l LE       ASSESSMENT AND PLAN:  Acute on chronic anemia: r/o Upper GIB. s/p 1u PBC  -Trend CBC, keep 2 large bore IVs, active type and screen. Transfuse if Hb <7, symptomatic, or actively hemorrhaging.   -Protonix IV q12, hold eliquis (no need to reverse), ASA, and iron for now (can interfere with EGD  -GI Eval for endoscopy     Dysphagia: occasionally coughs when eating  -Speech and swallow eval     KEEGAN on CKD IV, suspected pre-renal:  s/p 1u PRBC. Trend Cr, monitor strict I/O, check Urine Na, Cl, urea, urine Pr:Cr, and Renal US. If no improvement may need a Nephrology consult. Avoid nephrotoxic medications. Hold lasix until kidney function improved.     CAD s/p PCI / HTN / HLD: Continue home medications. ASA held for suspected bleed, lasix held for KEEGAN     Paroxsymal atrial fibrillation: rate controlled, cont metoprolol and multaq. Eliquis held until HB stable     Diabetes: Basal bolus insulin, keep fingerstick glucose <180.     DVT ppx: sequentials, restart eliquis when Hb is stable   GI ppx: Protonix IV BID   GOC: Full code.    My note supersedes the residents in the event of a discrepancy.

## 2021-05-04 NOTE — ED PROVIDER NOTE - CARE PLAN
Principal Discharge DX:	GI bleed   Principal Discharge DX:	GI bleed  Secondary Diagnosis:	Symptomatic anemia

## 2021-05-04 NOTE — ED PROVIDER NOTE - CADM POA CENTRAL LINE
H&P Update: 
Omega Guerin was seen and examined. History and physical has been reviewed. The patient has been examined. There have been no significant clinical changes since the completion of the originally dated History and Physical. 
Patient identified by surgeon; surgical site was confirmed by patient and surgeon.  
 
Signed By: Swathi Cason MD   
 November 30, 2018 1:12 PM   
 

No

## 2021-05-04 NOTE — ED PROVIDER NOTE - ATTENDING CONTRIBUTION TO CARE
76yo man h/o HTN, HLD, CAD s/p stents, CKD, on Eliquis (unclear why) sent in by Dr Kleiner for drop in Hb from 12 a few weeks ago to 7. Pt had c/o of feeling lightheaded over the last couple of days and was orthostatic in the office; he reports some dark stools but hasn't thought anything of it because he takes iron. He denies abdominal pain, nausea, vomiting, weight loss. Last colonoscopy was a few years ago with Dr Crenshaw, but no EGD previously. On exam he is alert and nontoxic appearing, VS as noted. Lungs CTA, CVS1S2 RRR abd soft, NT. Rectal per Dr Vazquez with no gross blood. Stool is dark. Will check labs and likely transfuse, admit for GI eval and further workup.

## 2021-05-04 NOTE — H&P ADULT - ASSESSMENT
75 years old male with PMHx of HTN, HLD, CAD s/p stents, CKD 4, Paroxysmal atrial fibrillation on Eliquis c/o Hgb of 7.0 from PMD office.     Impression:  Chronic macrocytic anemia  KEEGAN on CKD 4  H paroxysmal afibb  HO CAD s/p PCI  HO HTN / DLD      # Chronic macrocytic anemia  - check labs:  b12 , folate, iron, ferritin, tibc, ldh, haptoglobin, spep, upep, epo level  - trend cbc, keep active type and scree and transfuse if if hb < 7  - stool guaic  - keep 2 large bore IV lines     # KEEGAN on CKD 4  - baseline: 1.5 - 2.0  - check ua / urine electrolytes + pr:cr, serum osm  - check us retroperitoneal  - trend bmp / scr  - c/w lasix  - nephro eval if no improvement     # HO Paroxysmal afibb  - hold AC  - nCHAD VASC  - Has bled  - c/w BB    # HO CAD s/p PCI - c/w statin ASA  # HO HTN/DLD - c/w medications    # Diet: DASH/TLC  # DVT Prophylaxis: Lovenox  # GI Prophylaxis:   # Activity: IAT  # IV Fluids:  # Dispo: Acute  # Code Status: Fulcode  # CHG wash  75 years old male with PMHx of HTN, HLD, CAD s/p stents, CKD 4, Paroxysmal atrial fibrillation on Eliquis c/o Hgb of 7.0 from PMD office.     Impression:  Chronic macrocytic anemia  KEEGAN on CKD 4  H paroxysmal afibb  HO CAD s/p PCI  HO HTN / DLD      # Chronic macrocytic anemia s/p 1unit PRBC  - baseline 10-12  - check labs:  b12 , folate, iron, ferritin, tibc, ldh, haptoglobin, epo level  - trend cbc, keep active type and screen and transfuse if if hb < 7  - hold eliquis  - stool guaic  - keep 2 large bore IV lines  - IV protonix 40 bid  - consult GI for colonoscopy and EGD    # KEEGAN on CKD 4 -  r/o ATN  - baseline: 1.5 - 2.0  - check ua / urine electrolytes + pr:cr, serum osm  - check us retroperitoneal  - trend bmp / scr  - lasix 40mg PO qd, home dose is bid, f/u nephro  - check TTE  - nephro c/s Dr. Kleiner     # HO Paroxysmal afibb  - KEIRA VASC 5  - Has bled score 3  - hold AC  - c/w BB    # HO CAD s/p PCI - c/w statin ASA  # HO HTN/DLD - c/w medications  # HO T2DM --  hold oral meds;  check fs;  start and adjust insulin s/s prn     # Diet: DASH/TLC  # DVT Prophylaxis: SCD  # GI Prophylaxis: Protonix  # Activity: IAT  # Dispo: Acute  # Code Status: Fullcode

## 2021-05-04 NOTE — H&P ADULT - NSICDXPASTMEDICALHX_GEN_ALL_CORE_FT
PAST MEDICAL HISTORY:  CAD (coronary artery disease)     High cholesterol     HTN (hypertension)     Kidney stones

## 2021-05-04 NOTE — ED ADULT NURSE NOTE - OBJECTIVE STATEMENT
Patient is a 75 year old male complaining of low hgb- patient outpatient was 7.0. no sob at this time.

## 2021-05-04 NOTE — H&P ADULT - NSHPLABSRESULTS_GEN_ALL_CORE
(05-04 @ 19:00)                      7.3  10.12 )-----------( 240                 24.5    Neutrophils = 5.96 (58.9%)  Lymphocytes = 2.69 (26.6%)  Eosinophils = 0.11 (1.1%)  Basophils = 0.02 (0.2%)  Monocytes = 1.29 (12.7%)  Bands = --%    05-04    140  |  103  |  67<HH>  ----------------------------<  97  4.2   |  23  |  3.7<H>    Ca    8.2<L>      04 May 2021 19:00    TPro  5.9<L>  /  Alb  4.0  /  TBili  0.7  /  DBili  x   /  AST  25  /  ALT  15  /  AlkPhos  121<H>  05-04    ( 04 May 2021 19:00 )   PT: 21.80 sec;   INR: 1.90 ratio;       PTT:37.9 sec

## 2021-05-05 LAB
ALBUMIN SERPL ELPH-MCNC: 3.8 G/DL — SIGNIFICANT CHANGE UP (ref 3.5–5.2)
ALP SERPL-CCNC: 141 U/L — HIGH (ref 30–115)
ALT FLD-CCNC: 15 U/L — SIGNIFICANT CHANGE UP (ref 0–41)
ANION GAP SERPL CALC-SCNC: 9 MMOL/L — SIGNIFICANT CHANGE UP (ref 7–14)
APPEARANCE UR: CLEAR — SIGNIFICANT CHANGE UP
AST SERPL-CCNC: 23 U/L — SIGNIFICANT CHANGE UP (ref 0–41)
BILIRUB SERPL-MCNC: 2 MG/DL — HIGH (ref 0.2–1.2)
BILIRUB UR-MCNC: NEGATIVE — SIGNIFICANT CHANGE UP
BUN SERPL-MCNC: 48 MG/DL — HIGH (ref 10–20)
CALCIUM SERPL-MCNC: 8.3 MG/DL — LOW (ref 8.5–10.1)
CHLORIDE SERPL-SCNC: 104 MMOL/L — SIGNIFICANT CHANGE UP (ref 98–110)
CO2 SERPL-SCNC: 25 MMOL/L — SIGNIFICANT CHANGE UP (ref 17–32)
COLOR SPEC: SIGNIFICANT CHANGE UP
CREAT ?TM UR-MCNC: 67 MG/DL — SIGNIFICANT CHANGE UP
CREAT SERPL-MCNC: 3 MG/DL — HIGH (ref 0.7–1.5)
DIFF PNL FLD: NEGATIVE — SIGNIFICANT CHANGE UP
GLUCOSE BLDC GLUCOMTR-MCNC: 132 MG/DL — HIGH (ref 70–99)
GLUCOSE BLDC GLUCOMTR-MCNC: 157 MG/DL — HIGH (ref 70–99)
GLUCOSE BLDC GLUCOMTR-MCNC: 163 MG/DL — HIGH (ref 70–99)
GLUCOSE BLDC GLUCOMTR-MCNC: 226 MG/DL — HIGH (ref 70–99)
GLUCOSE SERPL-MCNC: 153 MG/DL — HIGH (ref 70–99)
GLUCOSE UR QL: ABNORMAL
HCT VFR BLD CALC: 30.8 % — LOW (ref 42–52)
HGB BLD-MCNC: 9.7 G/DL — LOW (ref 14–18)
KETONES UR-MCNC: NEGATIVE — SIGNIFICANT CHANGE UP
LEUKOCYTE ESTERASE UR-ACNC: NEGATIVE — SIGNIFICANT CHANGE UP
MAGNESIUM SERPL-MCNC: 2.1 MG/DL — SIGNIFICANT CHANGE UP (ref 1.8–2.4)
MCHC RBC-ENTMCNC: 29.8 PG — SIGNIFICANT CHANGE UP (ref 27–31)
MCHC RBC-ENTMCNC: 31.5 G/DL — LOW (ref 32–37)
MCV RBC AUTO: 94.5 FL — HIGH (ref 80–94)
NITRITE UR-MCNC: NEGATIVE — SIGNIFICANT CHANGE UP
NRBC # BLD: 0 /100 WBCS — SIGNIFICANT CHANGE UP (ref 0–0)
OSMOLALITY UR: 442 MOS/KG — SIGNIFICANT CHANGE UP (ref 50–1200)
PH UR: 6.5 — SIGNIFICANT CHANGE UP (ref 5–8)
PLATELET # BLD AUTO: 216 K/UL — SIGNIFICANT CHANGE UP (ref 130–400)
POTASSIUM SERPL-MCNC: 4.3 MMOL/L — SIGNIFICANT CHANGE UP (ref 3.5–5)
POTASSIUM SERPL-SCNC: 4.3 MMOL/L — SIGNIFICANT CHANGE UP (ref 3.5–5)
PROT ?TM UR-MCNC: 15 MG/DLG/24H — SIGNIFICANT CHANGE UP
PROT SERPL-MCNC: 5.6 G/DL — LOW (ref 6–8)
PROT UR-MCNC: SIGNIFICANT CHANGE UP
PROT/CREAT UR-RTO: 0.2 RATIO — SIGNIFICANT CHANGE UP (ref 0–0.2)
RBC # BLD: 3.26 M/UL — LOW (ref 4.7–6.1)
RBC # FLD: 17.6 % — HIGH (ref 11.5–14.5)
SODIUM SERPL-SCNC: 138 MMOL/L — SIGNIFICANT CHANGE UP (ref 135–146)
SODIUM UR-SCNC: 60 MMOL/L — SIGNIFICANT CHANGE UP
SP GR SPEC: 1.01 — SIGNIFICANT CHANGE UP (ref 1.01–1.03)
UROBILINOGEN FLD QL: SIGNIFICANT CHANGE UP
WBC # BLD: 9.44 K/UL — SIGNIFICANT CHANGE UP (ref 4.8–10.8)
WBC # FLD AUTO: 9.44 K/UL — SIGNIFICANT CHANGE UP (ref 4.8–10.8)

## 2021-05-05 PROCEDURE — 76770 US EXAM ABDO BACK WALL COMP: CPT | Mod: 26

## 2021-05-05 PROCEDURE — 99223 1ST HOSP IP/OBS HIGH 75: CPT

## 2021-05-05 PROCEDURE — 71045 X-RAY EXAM CHEST 1 VIEW: CPT | Mod: 26

## 2021-05-05 RX ORDER — DRONEDARONE 400 MG/1
400 TABLET, FILM COATED ORAL
Refills: 0 | Status: DISCONTINUED | OUTPATIENT
Start: 2021-05-05 | End: 2021-05-07

## 2021-05-05 RX ORDER — FUROSEMIDE 40 MG
40 TABLET ORAL ONCE
Refills: 0 | Status: COMPLETED | OUTPATIENT
Start: 2021-05-05 | End: 2021-05-05

## 2021-05-05 RX ORDER — HYDROXYZINE HCL 10 MG
25 TABLET ORAL ONCE
Refills: 0 | Status: DISCONTINUED | OUTPATIENT
Start: 2021-05-05 | End: 2021-05-05

## 2021-05-05 RX ADMIN — DRONEDARONE 400 MILLIGRAM(S): 400 TABLET, FILM COATED ORAL at 05:35

## 2021-05-05 RX ADMIN — PANTOPRAZOLE SODIUM 40 MILLIGRAM(S): 20 TABLET, DELAYED RELEASE ORAL at 05:35

## 2021-05-05 RX ADMIN — Medication 1 MILLIGRAM(S): at 11:43

## 2021-05-05 RX ADMIN — DRONEDARONE 400 MILLIGRAM(S): 400 TABLET, FILM COATED ORAL at 17:38

## 2021-05-05 RX ADMIN — Medication 25 MILLIGRAM(S): at 05:35

## 2021-05-05 RX ADMIN — ATORVASTATIN CALCIUM 80 MILLIGRAM(S): 80 TABLET, FILM COATED ORAL at 21:23

## 2021-05-05 RX ADMIN — PANTOPRAZOLE SODIUM 40 MILLIGRAM(S): 20 TABLET, DELAYED RELEASE ORAL at 17:38

## 2021-05-05 RX ADMIN — Medication 25 MILLIGRAM(S): at 17:38

## 2021-05-05 RX ADMIN — Medication 1 TABLET(S): at 11:43

## 2021-05-05 RX ADMIN — CHLORHEXIDINE GLUCONATE 1 APPLICATION(S): 213 SOLUTION TOPICAL at 05:35

## 2021-05-05 RX ADMIN — Medication 40 MILLIGRAM(S): at 13:08

## 2021-05-05 NOTE — SWALLOW BEDSIDE ASSESSMENT ADULT - ADDITIONAL RECOMMENDATIONS
outpt f/u is symptoms of dysphagia worsen/persist. Finasteride Male Counseling: Finasteride Counseling:  I discussed with the patient the risks of use of finasteride including but not limited to decreased libido, decreased ejaculate volume, gynecomastia, and depression. Women should not handle medication.  All of the patient's questions and concerns were addressed. Finasteride Counseling:  I discussed with the patient the risks of use of finasteride including but not limited to decreased libido, decreased ejaculate volume, gynecomastia, and depression. Women should not handle medication.  All of the patient's questions and concerns were addressed.

## 2021-05-05 NOTE — CHART NOTE - NSCHARTNOTEFT_GEN_A_CORE
75yoM c recent covid infx, hx dm2, cad c pci/ MI hx, chronic paroxysmal afib on eliquis, here with  acute on chronic anemia symptomatic with orthostatic hypotension, and bernard on ckd    therapeutic transfusions prbc, active T+S, trend hgb, large bore iv  monitor for volume overload/ may need extra lasix, repeat cxr in am  gi eval probable endoscopic/ colonoscopic evaluation- dark stools but on iron; begin prep tomorrow if scope friday

## 2021-05-05 NOTE — CONSULT NOTE ADULT - ASSESSMENT
Mr. Agustin is a 75 years old male patient with PMHx of HTN, HLD, T2DM, CAD s/p stents, CKD 4, Paroxysmal atrial fibrillation on Eliquis referred to ED due to Hgb of 7.0 from PMD office. Nephrology consulted for KEEGAN on CKD stage 4.    # KEEGAN on CKD stage 4.   # Hypocalcemia (Ca: 8.2, albumin: 4)  - likely to be prerenal given suspected GI bleed, Hb drop 12---->7 (s/p 1u of PRBC on 5/4)  - Baseline creatinine 3---> up to 3.8.   - Non-oliguric: urine output 800 mL in the last 12-18 hours.   - No hyperkalemia (K: 4.2), no metabolic acidosis (CO2: 23), no shortness of breath on exam.   - Kidney US: no hydronephrosis. Prevoid volume of approximately 350 cc.  Postvoid volume is approximately 15 cc.    Recommendations:  1- Please check serum phosphorus level.   2- Please check urine Na, creatinine, urea, protein/creatinine ratio.  3- Agree to continue to hold home lasix (40 mg BID).  *** pending   Mr. Agustin is a 75 years old male patient with PMHx of HTN, HLD, T2DM, CAD s/p stents, CKD 4, Paroxysmal atrial fibrillation on Eliquis referred to ED due to Hgb of 7.0 from PMD office. Nephrology consulted for KEEGAN on CKD stage 4.    # KEEGAN on CKD stage 4.   # Hypocalcemia (Ca: 8.2, albumin: 4)  - likely to be prerenal given suspected GI bleed, Hb drop 12---->7 (s/p 1u of PRBC on 5/4)  - Baseline creatinine 3---> up to 3.8.   - Non-oliguric: urine output 800 mL in the last 12-18 hours.   - No hyperkalemia (K: 4.2), no metabolic acidosis (CO2: 23), no shortness of breath on exam.   - Kidney US: no hydronephrosis. Prevoid volume of approximately 350 cc.  Postvoid volume is approximately 15 cc.    Recommendations:  1- Please check serum phosphorus level, and based on level will determine treatment of mild hypocalcemia  2- Please check urine Na, creatinine, urea to confirm prerenal etiology.   3- Given hypertension, and volume overload on exam, can restart Lasix cautiously and monitor for signs of intravascular depletion.    4- Daily CMP + phosphorus

## 2021-05-05 NOTE — CONSULT NOTE ADULT - ASSESSMENT
Probable recent GI  Bleed R/O recurrent GI Bleed from Gastric ulcer vs colonic        REC;  1. continue to monitor H/ and VS - Tranasfuse if hct< 25  2.  Hold Eliquis  3  Clear liquid  diiet,  NPO after midnight Thursday  4.   Pantaprazole 40 mg BID  5  EGD/colonoscopy (lasty was neg 7 yrs ago) ON fRIDAY am (after Eliquis wears out)  6.  Bowel prep  tomorrow -Dulcolox tabs iv at 3 PM tomorrow, Golyttely 8oz po q15m po starting at 1 PM until 2 qts are consumed.   Then back to  clear fluid diet until midnight

## 2021-05-05 NOTE — CONSULT NOTE ADULT - SUBJECTIVE AND OBJECTIVE BOX
NEPHROLOGY CONSULTATION NOTE    THIS CONSULT IS INCOMPLETE / FULL CONSULT TO FOLLOW    Mr. Agustin is a 75 years old male patient with PMHx of HTN, HLD, T2DM, CAD s/p stents, CKD 4, Paroxysmal atrial fibrillation on Eliquis c/o Hgb of 7.0 from PMD office.   Patient was feeling dizzy/lightheaded for past couple of days, no LOC. Went to PMD Dr. Kleiner's office, got blood work, today Hgb result came back 7.0. Patient has chronic dark stool due to iron pill, did not notice any change in stool color or bright red blood. Last BM this morning and stool was normal. Denies hematuria, hematemesis or hemoptysis. Was admitted in April 2020 for COVID PNA, hospital course complicated with New onset afibb, started on Eliquis.   Denies fever, chills, chest pain, SOB, abdominal pain, n/v. Never had EGD. Last colonoscopy a couple of years ago by Dr. Hand, and was normal.    In the ED, Temp 97, /72, HR 50, RR 19 saturating well on room air, labs showed Hb Hb 7.3, MCV 98, INR 1.90, Cr 3.7. Pt received Protonix IV in ED. To be admitted under medicine for further workup.    Patient denies shortness of breath, orthopnea, denies changes in urine output. Has been on lasix 40 mg BID. Creatinine up to 3.8. Nephrology consulted for evaluation of KEEGAN on CKD stage 4.     PAST MEDICAL & SURGICAL HISTORY:  HTN (hypertension)    High cholesterol    Kidney stones    CAD (coronary artery disease)    S/P drug eluting coronary stent placement  &gt; 10 years ago      Allergies:  No Known Allergies    Home Medications Reviewed  Hospital Medications:   MEDICATIONS  (STANDING):  atorvastatin 80 milliGRAM(s) Oral at bedtime  chlorhexidine 4% Liquid 1 Application(s) Topical <User Schedule>  dronedarone 400 milliGRAM(s) Oral two times a day  folic acid 1 milliGRAM(s) Oral daily  metoprolol tartrate 25 milliGRAM(s) Oral two times a day  multivitamin 1 Tablet(s) Oral daily  pantoprazole  Injectable 40 milliGRAM(s) IV Push two times a day      SOCIAL HISTORY:  Denies ETOH,Smoking,   FAMILY HISTORY:        REVIEW OF SYSTEMS:  CONSTITUTIONAL: Noted weakness. No fevers or chills  EYES/ENT: No visual changes;  No vertigo or throat pain   NECK: No pain or stiffness  RESPIRATORY: No cough, wheezing, hemoptysis; No shortness of breath  CARDIOVASCULAR: No chest pain or palpitations.  GASTROINTESTINAL: No abdominal or epigastric pain. No nausea, vomiting, or hematemesis; No diarrhea or constipation. Noted melena.  GENITOURINARY: No dysuria, noted frequency, no foamy urine, no urinary urgency, incontinence or hematuria  NEUROLOGICAL: No numbness or weakness  SKIN: No itching, burning, rashes, or lesions   VASCULAR: Noted bilateral lower extremity edema.   All other review of systems is negative unless indicated above.    VITALS:  T(F): 98.2 (05-05-21 @ 07:33), Max: 98.6 (05-05-21 @ 05:00)  HR: 52 (05-05-21 @ 07:33)  BP: 143/65 (05-05-21 @ 07:33)  RR: 18 (05-05-21 @ 05:00)  SpO2: 96% (05-05-21 @ 05:00)    05-04 @ 07:01  -  05-05 @ 07:00  --------------------------------------------------------  IN: 0 mL / OUT: 800 mL / NET: -800 mL      Height (cm): 172.7 (05-05 @ 01:35)  Weight (kg): 99.2 (05-05 @ 01:35)  BMI (kg/m2): 33.3 (05-05 @ 01:35)  BSA (m2): 2.12 (05-05 @ 01:35)      I&O's Detail    04 May 2021 07:01  -  05 May 2021 07:00  --------------------------------------------------------  IN:  Total IN: 0 mL    OUT:    Voided (mL): 800 mL  Total OUT: 800 mL    Total NET: -800 mL      PHYSICAL EXAM:  Constitutional: NAD, on room air  HEENT: anicteric sclera, oropharynx clear   Neck: supple, no JVD  Respiratory: BGAE, crackles heard over right lower lung field.   Cardiovascular: S1, S2, RRR  Gastrointestinal: BS+, soft, NT/ND  Extremities: No cyanosis or clubbing. Bilateral pitting edema  Neurological: A/O x 3, no focal deficits  Psychiatric: Normal mood, normal affect  : No CVA tenderness. No sosa.   Skin: No rashes      LABS:  05-04    140  |  103  |  67<HH>  ----------------------------<  97  4.2   |  23  |  3.7<H>    Ca    8.2<L>      04 May 2021 19:00    TPro  5.9<L>  /  Alb  4.0  /  TBili  0.7  /  DBili      /  AST  25  /  ALT  15  /  AlkPhos  121<H>  05-04    Creatinine Trend: 3.7 <--                        7.3    10.12 )-----------( 240      ( 04 May 2021 19:00 )             24.5     Urine Studies:              RADIOLOGY & ADDITIONAL STUDIES:    < from:  Kidney and Bladder (05.05.21 @ 00:18) >  FINDINGS:    Right kidney: 10.5 x 5 x 5 cm. Small exophytic cyst measuring 1.5 cm within the mid pole, without change. No hydronephrosis or nephrolithiasis.    Left kidney:  9.8 x 4.8 x 5 cm. 2.1 x 2 cm mid pole parenchymal cyst. No hydronephrosis or nephrolithiasis.    Urinary bladder: No debris or mass is recognized. Ureteral jets are not well seen. Prevoid volume of approximately 350 cc.  Postvoid volume is approximately 15 cc.    < end of copied text >              
76 yo male well known to me (Had a previous UGI Bleed secondarty to gastric ulcer about 3 years ago. Admitted now with anemia and black stools (pt on po Fe)   had light headedness and signs of hypovolemia-  Pt was transfused 2 units of packed cells.  A few  weeks a go hgb was 12 now admittwed with Hgb of 7.  Today hgb 9. hE denies any aBDO pain.  Pt is on elequis for A Fib

## 2021-05-05 NOTE — SWALLOW BEDSIDE ASSESSMENT ADULT - SLP PERTINENT HISTORY OF CURRENT PROBLEM
Pt admitted w/ dizzziness, acute on chronic anemia Hgb 7.0 on admission. PMH: HTN, HLd, CAD, AFib, COVID. Pt reports occasional coughing at home w/ thin liquids for ~1 year, no reported h/o PNA (besides COVID PNA). Known to ST services from previous hospitalization where he was evaluated post extubation w/ reccs for regular consistency diet w/ thin liquids

## 2021-05-05 NOTE — PROGRESS NOTE ADULT - SUBJECTIVE AND OBJECTIVE BOX
SUBJECTIVE:    Patient is a 75y old Male who presents with a chief complaint of Anemia (05 May 2021 09:43)    Currently admitted to medicine with the primary diagnosis of GI bleed       24 hour update:  Today is hospital day 1d. This morning he is resting comfortably in bed and reports no new issues or overnight events.      PAST MEDICAL & SURGICAL HISTORY  HTN (hypertension)    High cholesterol    Kidney stones    CAD (coronary artery disease)    S/P drug eluting coronary stent placement  &gt; 10 years ago      SOCIAL HISTORY:    ALLERGIES:  No Known Allergies    MEDICATIONS:  STANDING MEDICATIONS  atorvastatin 80 milliGRAM(s) Oral at bedtime  chlorhexidine 4% Liquid 1 Application(s) Topical <User Schedule>  dronedarone 400 milliGRAM(s) Oral two times a day  folic acid 1 milliGRAM(s) Oral daily  metoprolol tartrate 25 milliGRAM(s) Oral two times a day  multivitamin 1 Tablet(s) Oral daily  pantoprazole  Injectable 40 milliGRAM(s) IV Push two times a day    PRN MEDICATIONS    VITALS:   T(F): 97.9  HR: 53  BP: 157/67  RR: 18  SpO2: 96%    PHYSICAL EXAM:  GENERAL: NAD,  HEENT:  NCAT, EOMI, moist mucous membranes   CHEST/LUNG: CTA b/l  HEART: RRR, s1, s2  ABDOMEN: Soft, Nontender, Nondistended; Bowel sounds present  EXTREMITIES:  +1 pitting lower extremity edema. No calf tenderness to palpation.  NEUROLOGY: AAOx3, non-focal  SKIN: No rashes or lesions. stasis dermatitis on b/l LE     LABS:                        7.3    10.12 )-----------( 240      ( 04 May 2021 19:00 )             24.5     140  |  103  |  67<HH>  ----------------------------<  97  4.2   |  23  |  3.7<H>    Ca    8.2<L>      04 May 2021 19:00    TPro  5.9<L>  /  Alb  4.0  /  TBili  0.7  /  DBili  x   /  AST  25  /  ALT  15  /  AlkPhos  121<H>  05-04    PT/INR - ( 04 May 2021 19:00 )   PT: 21.80 sec;   INR: 1.90 ratio    PTT - ( 04 May 2021 19:00 )  PTT:37.9 sec    Urinalysis Basic - ( 04 May 2021 13:51 )  Color: Light Yellow / Appearance: Clear / S.013 / pH: x  Gluc: x / Ketone: Negative  / Bili: Negative / Urobili: <2 mg/dL   Blood: x / Protein: Trace / Nitrite: Negative   Leuk Esterase: Negative / RBC: x / WBC x   Sq Epi: x / Non Sq Epi: x / Bacteria: x                      IMAGING:       SUBJECTIVE:    Patient is a 75y old Male who presents with a chief complaint of Anemia (05 May 2021 09:43)    Currently admitted to medicine with the primary diagnosis of GI bleed       24 hour update:  Today is hospital day 1d. This morning he is resting comfortably in bed and reports no new issues or overnight events.      PAST MEDICAL & SURGICAL HISTORY  HTN (hypertension)    High cholesterol    Kidney stones    CAD (coronary artery disease)    S/P drug eluting coronary stent placement  &gt; 10 years ago      SOCIAL HISTORY:    ALLERGIES:  No Known Allergies    MEDICATIONS:  STANDING MEDICATIONS  atorvastatin 80 milliGRAM(s) Oral at bedtime  chlorhexidine 4% Liquid 1 Application(s) Topical <User Schedule>  dronedarone 400 milliGRAM(s) Oral two times a day  folic acid 1 milliGRAM(s) Oral daily  metoprolol tartrate 25 milliGRAM(s) Oral two times a day  multivitamin 1 Tablet(s) Oral daily  pantoprazole  Injectable 40 milliGRAM(s) IV Push two times a day    PRN MEDICATIONS    VITALS:   T(F): 97.9  HR: 53  BP: 157/67  RR: 18  SpO2: 96%    PHYSICAL EXAM:  GENERAL: NAD,  HEENT:  NCAT, EOMI, moist mucous membranes   CHEST/LUNG: CTA b/l  HEART: RRR, s1, s2  ABDOMEN: Soft, Nontender, Nondistended; Bowel sounds present  EXTREMITIES:  +1 pitting lower extremity edema. No calf tenderness to palpation.  NEUROLOGY: AAOx3, non-focal  SKIN: No rashes or lesions. stasis dermatitis on b/l LE     LABS:                        7.3    10.12 )-----------( 240      ( 04 May 2021 19:00 )             24.5     140  |  103  |  67<HH>  ----------------------------<  97  4.2   |  23  |  3.7<H>    Ca    8.2<L>      04 May 2021 19:00    TPro  5.9<L>  /  Alb  4.0  /  TBili  0.7  /  DBili  x   /  AST  25  /  ALT  15  /  AlkPhos  121<H>  05-04    PT/INR - ( 04 May 2021 19:00 )   PT: 21.80 sec;   INR: 1.90 ratio    PTT - ( 04 May 2021 19:00 )  PTT:37.9 sec    Urinalysis Basic - ( 04 May 2021 13:51 )  Color: Light Yellow / Appearance: Clear / S.013 / pH: x  Gluc: x / Ketone: Negative  / Bili: Negative / Urobili: <2 mg/dL   Blood: x / Protein: Trace / Nitrite: Negative   Leuk Esterase: Negative / RBC: x / WBC x   Sq Epi: x / Non Sq Epi: x / Bacteria: x    IMAGING:  US Kidney and Bladder (21 @ 00:18)  IMPRESSION:  No hydronephrosis or nephrolithiasis.

## 2021-05-05 NOTE — SWALLOW BEDSIDE ASSESSMENT ADULT - SWALLOW EVAL: DIAGNOSIS
eyal-pharyngeal swallow is WFL for thin, puree and regular solids w/o any overt s/s of penetration/aspiration. Pt denies, globus, denies GERD, denies odynophagia

## 2021-05-06 LAB
ALBUMIN SERPL ELPH-MCNC: 3.6 G/DL — SIGNIFICANT CHANGE UP (ref 3.5–5.2)
ALP SERPL-CCNC: 134 U/L — HIGH (ref 30–115)
ALT FLD-CCNC: 14 U/L — SIGNIFICANT CHANGE UP (ref 0–41)
ANION GAP SERPL CALC-SCNC: 12 MMOL/L — SIGNIFICANT CHANGE UP (ref 7–14)
AST SERPL-CCNC: 21 U/L — SIGNIFICANT CHANGE UP (ref 0–41)
BASOPHILS # BLD AUTO: 0.02 K/UL — SIGNIFICANT CHANGE UP (ref 0–0.2)
BASOPHILS NFR BLD AUTO: 0.2 % — SIGNIFICANT CHANGE UP (ref 0–1)
BILIRUB SERPL-MCNC: 1.5 MG/DL — HIGH (ref 0.2–1.2)
BUN SERPL-MCNC: 39 MG/DL — HIGH (ref 10–20)
CALCIUM SERPL-MCNC: 8.3 MG/DL — LOW (ref 8.5–10.1)
CHLORIDE SERPL-SCNC: 105 MMOL/L — SIGNIFICANT CHANGE UP (ref 98–110)
CO2 SERPL-SCNC: 22 MMOL/L — SIGNIFICANT CHANGE UP (ref 17–32)
CREAT ?TM UR-MCNC: 82 MG/DL — SIGNIFICANT CHANGE UP
CREAT SERPL-MCNC: 2.8 MG/DL — HIGH (ref 0.7–1.5)
EOSINOPHIL # BLD AUTO: 0.13 K/UL — SIGNIFICANT CHANGE UP (ref 0–0.7)
EOSINOPHIL NFR BLD AUTO: 1.6 % — SIGNIFICANT CHANGE UP (ref 0–8)
GLUCOSE BLDC GLUCOMTR-MCNC: 100 MG/DL — HIGH (ref 70–99)
GLUCOSE BLDC GLUCOMTR-MCNC: 132 MG/DL — HIGH (ref 70–99)
GLUCOSE BLDC GLUCOMTR-MCNC: 136 MG/DL — HIGH (ref 70–99)
GLUCOSE BLDC GLUCOMTR-MCNC: 89 MG/DL — SIGNIFICANT CHANGE UP (ref 70–99)
GLUCOSE SERPL-MCNC: 127 MG/DL — HIGH (ref 70–99)
HCT VFR BLD CALC: 31 % — LOW (ref 42–52)
HGB BLD-MCNC: 9.7 G/DL — LOW (ref 14–18)
IMM GRANULOCYTES NFR BLD AUTO: 0.2 % — SIGNIFICANT CHANGE UP (ref 0.1–0.3)
LYMPHOCYTES # BLD AUTO: 1.62 K/UL — SIGNIFICANT CHANGE UP (ref 1.2–3.4)
LYMPHOCYTES # BLD AUTO: 19.5 % — LOW (ref 20.5–51.1)
MAGNESIUM SERPL-MCNC: 2 MG/DL — SIGNIFICANT CHANGE UP (ref 1.8–2.4)
MCHC RBC-ENTMCNC: 29.4 PG — SIGNIFICANT CHANGE UP (ref 27–31)
MCHC RBC-ENTMCNC: 31.3 G/DL — LOW (ref 32–37)
MCV RBC AUTO: 93.9 FL — SIGNIFICANT CHANGE UP (ref 80–94)
MONOCYTES # BLD AUTO: 1.17 K/UL — HIGH (ref 0.1–0.6)
MONOCYTES NFR BLD AUTO: 14.1 % — HIGH (ref 1.7–9.3)
NEUTROPHILS # BLD AUTO: 5.33 K/UL — SIGNIFICANT CHANGE UP (ref 1.4–6.5)
NEUTROPHILS NFR BLD AUTO: 64.4 % — SIGNIFICANT CHANGE UP (ref 42.2–75.2)
NRBC # BLD: 0 /100 WBCS — SIGNIFICANT CHANGE UP (ref 0–0)
PLATELET # BLD AUTO: 213 K/UL — SIGNIFICANT CHANGE UP (ref 130–400)
POTASSIUM SERPL-MCNC: 4.2 MMOL/L — SIGNIFICANT CHANGE UP (ref 3.5–5)
POTASSIUM SERPL-SCNC: 4.2 MMOL/L — SIGNIFICANT CHANGE UP (ref 3.5–5)
PROT SERPL-MCNC: 5.4 G/DL — LOW (ref 6–8)
RBC # BLD: 3.3 M/UL — LOW (ref 4.7–6.1)
RBC # FLD: 17.7 % — HIGH (ref 11.5–14.5)
SODIUM SERPL-SCNC: 139 MMOL/L — SIGNIFICANT CHANGE UP (ref 135–146)
SODIUM UR-SCNC: 73 MMOL/L — SIGNIFICANT CHANGE UP
UUN UR-MCNC: 741 MG/DL — SIGNIFICANT CHANGE UP
WBC # BLD: 8.29 K/UL — SIGNIFICANT CHANGE UP (ref 4.8–10.8)
WBC # FLD AUTO: 8.29 K/UL — SIGNIFICANT CHANGE UP (ref 4.8–10.8)

## 2021-05-06 PROCEDURE — 99233 SBSQ HOSP IP/OBS HIGH 50: CPT

## 2021-05-06 PROCEDURE — 93306 TTE W/DOPPLER COMPLETE: CPT | Mod: 26

## 2021-05-06 RX ORDER — SOD SULF/SODIUM/NAHCO3/KCL/PEG
4000 SOLUTION, RECONSTITUTED, ORAL ORAL ONCE
Refills: 0 | Status: COMPLETED | OUTPATIENT
Start: 2021-05-06 | End: 2021-05-06

## 2021-05-06 RX ADMIN — PANTOPRAZOLE SODIUM 40 MILLIGRAM(S): 20 TABLET, DELAYED RELEASE ORAL at 05:30

## 2021-05-06 RX ADMIN — Medication 1 MILLIGRAM(S): at 11:24

## 2021-05-06 RX ADMIN — PANTOPRAZOLE SODIUM 40 MILLIGRAM(S): 20 TABLET, DELAYED RELEASE ORAL at 17:10

## 2021-05-06 RX ADMIN — DRONEDARONE 400 MILLIGRAM(S): 400 TABLET, FILM COATED ORAL at 17:09

## 2021-05-06 RX ADMIN — Medication 20 MILLIGRAM(S): at 15:50

## 2021-05-06 RX ADMIN — Medication 20 MILLIGRAM(S): at 21:31

## 2021-05-06 RX ADMIN — ATORVASTATIN CALCIUM 80 MILLIGRAM(S): 80 TABLET, FILM COATED ORAL at 21:30

## 2021-05-06 RX ADMIN — Medication 1 TABLET(S): at 11:24

## 2021-05-06 RX ADMIN — Medication 25 MILLIGRAM(S): at 05:29

## 2021-05-06 RX ADMIN — DRONEDARONE 400 MILLIGRAM(S): 400 TABLET, FILM COATED ORAL at 05:29

## 2021-05-06 RX ADMIN — Medication 4000 MILLILITER(S): at 15:50

## 2021-05-06 RX ADMIN — Medication 25 MILLIGRAM(S): at 17:09

## 2021-05-06 NOTE — PROGRESS NOTE ADULT - SUBJECTIVE AND OBJECTIVE BOX
SUBJECTIVE:    Patient is a 75y old Male who presents with a chief complaint of Anemia (05 May 2021 09:43)    Currently admitted to medicine with the primary diagnosis of GI bleed       24 hour update:  Today is hospital day 2d. This morning he is resting comfortably in bed and reports no new issues or overnight events.      PAST MEDICAL & SURGICAL HISTORY  HTN (hypertension)    High cholesterol    Kidney stones    CAD (coronary artery disease)    S/P drug eluting coronary stent placement  &gt; 10 years ago      SOCIAL HISTORY:    ALLERGIES:  No Known Allergies    MEDICATIONS:  STANDING MEDICATIONS  atorvastatin 80 milliGRAM(s) Oral at bedtime  chlorhexidine 4% Liquid 1 Application(s) Topical <User Schedule>  dronedarone 400 milliGRAM(s) Oral two times a day  folic acid 1 milliGRAM(s) Oral daily  metoprolol tartrate 25 milliGRAM(s) Oral two times a day  multivitamin 1 Tablet(s) Oral daily  pantoprazole  Injectable 40 milliGRAM(s) IV Push two times a day    PRN MEDICATIONS    VITALS:   T(C): 36.7 (06 May 2021 05:21), Max: 37.4 (05 May 2021 21:33)  T(F): 98 (06 May 2021 05:21), Max: 99.3 (05 May 2021 21:33)  HR: 58 (06 May 2021 05:21) (53 - 67)  BP: 153/67 (06 May 2021 05:21) (148/63 - 157/67)  RR: 18 (06 May 2021 05:21) (18 - 18)  SpO2: 98% (06 May 2021 05:21) (98% - 98%)    PHYSICAL EXAM:  GENERAL: NAD,  HEENT:  NCAT, EOMI, moist mucous membranes   CHEST/LUNG: CTA b/l  HEART: RRR, s1, s2  ABDOMEN: Soft, Nontender, Nondistended; Bowel sounds present  EXTREMITIES:  +1 pitting lower extremity edema. No calf tenderness to palpation.  NEUROLOGY: AAOx3, non-focal  SKIN: No rashes or lesions. stasis dermatitis on b/l LE     LABS:             9.7    8.29  )-----------( 213      ( 06 May 2021 04:30 )             31.0     139  |  105  |  39<H>  ----------------------------<  127<H>  4.2   |  22  |  2.8<H>    Ca    8.3<L>      06 May 2021 04:30  Mg     2.0         TPro  5.4<L>  /  Alb  3.6  /  TBili  1.5<H>  /  DBili  x   /  AST  21  /  ALT  14  /  AlkPhos  134<H>                                      PT/INR - ( 04 May 2021 19:00 )   PT: 21.80 sec;   INR: 1.90 ratio    PTT - ( 04 May 2021 19:00 )  PTT:37.9 sec    Urinalysis Basic - ( 04 May 2021 13:51 )  Color: Light Yellow / Appearance: Clear / S.013 / pH: x  Gluc: x / Ketone: Negative  / Bili: Negative / Urobili: <2 mg/dL   Blood: x / Protein: Trace / Nitrite: Negative   Leuk Esterase: Negative / RBC: x / WBC x   Sq Epi: x / Non Sq Epi: x / Bacteria: x    IMAGING:  US Kidney and Bladder (21 @ 00:18)  IMPRESSION:  No hydronephrosis or nephrolithiasis.

## 2021-05-06 NOTE — PROGRESS NOTE ADULT - SUBJECTIVE AND OBJECTIVE BOX
Nephrology progress note    THIS IS AN INCOMPLETE NOTE . FULL NOTE TO FOLLOW SHORTLY    Patient is seen and examined, events over the last 24 h noted .    Allergies:  No Known Allergies    Hospital Medications:   MEDICATIONS  (STANDING):  atorvastatin 80 milliGRAM(s) Oral at bedtime  chlorhexidine 4% Liquid 1 Application(s) Topical <User Schedule>  dronedarone 400 milliGRAM(s) Oral two times a day  folic acid 1 milliGRAM(s) Oral daily  metoprolol tartrate 25 milliGRAM(s) Oral two times a day  multivitamin 1 Tablet(s) Oral daily  pantoprazole  Injectable 40 milliGRAM(s) IV Push two times a day        VITALS:  T(F): 98 (21 @ 05:21), Max: 99.3 (21 @ 21:33)  HR: 58 (21 @ 05:21)  BP: 153/67 (21 @ 05:21)  RR: 18 (21 @ 05:21)  SpO2: 98% (21 @ 05:21)  Wt(kg): --     @ 07:  -   @ 07:00  --------------------------------------------------------  IN: 0 mL / OUT: 800 mL / NET: -800 mL     @ 07:01  -   @ 07:00  --------------------------------------------------------  IN: 341 mL / OUT: 0 mL / NET: 341 mL          PHYSICAL EXAM:  Constitutional: NAD  HEENT: anicteric sclera, oropharynx clear, MMM  Neck: No JVD  Respiratory: CTAB, no wheezes, rales or rhonchi  Cardiovascular: S1, S2, RRR  Gastrointestinal: BS+, soft, NT/ND  Extremities: No cyanosis or clubbing. No peripheral edema  :  No sosa.   Skin: No rashes    LABS:      139  |  105  |  39<H>  ----------------------------<  127<H>  4.2   |  22  |  2.8<H>    Ca    8.3<L>      06 May 2021 04:30  Mg     2.0         TPro  5.4<L>  /  Alb  3.6  /  TBili  1.5<H>  /  DBili      /  AST  21  /  ALT  14  /  AlkPhos  134<H>                            9.7    8.29  )-----------( 213      ( 06 May 2021 04:30 )             31.0       Urine Studies:  Urinalysis Basic - ( 04 May 2021 13:51 )    Color: Light Yellow / Appearance: Clear / S.013 / pH:   Gluc:  / Ketone: Negative  / Bili: Negative / Urobili: <2 mg/dL   Blood:  / Protein: Trace / Nitrite: Negative   Leuk Esterase: Negative / RBC:  / WBC    Sq Epi:  / Non Sq Epi:  / Bacteria:       Protein/Creatinine Ratio Calculation: 0.2 Ratio ( @ 13:51)  Creatinine, Random Urine: 67 mg/dL ( @ 13:51)  Sodium, Random Urine: 60.0 mmoL/L ( @ 13:51)  Osmolality, Random Urine: 442 mos/kg ( @ 13:51)    RADIOLOGY & ADDITIONAL STUDIES:   Nephrology progress note  Patient is seen and examined, events over the last 24 h noted .  sp Echo this morning  for EGD and colonoscopy in AM     Allergies:  No Known Allergies    Hospital Medications:   MEDICATIONS  (STANDING):  atorvastatin 80 milliGRAM(s) Oral at bedtime  chlorhexidine 4% Liquid 1 Application(s) Topical <User Schedule>  dronedarone 400 milliGRAM(s) Oral two times a day  folic acid 1 milliGRAM(s) Oral daily  metoprolol tartrate 25 milliGRAM(s) Oral two times a day  multivitamin 1 Tablet(s) Oral daily  pantoprazole  Injectable 40 milliGRAM(s) IV Push two times a day        VITALS:  T(F): 98 (21 @ 05:21), Max: 99.3 (21 @ 21:33)  HR: 58 (21 @ 05:21)  BP: 153/67 (21 @ 05:21)  RR: 18 (21 @ 05:21)  SpO2: 98% (21 @ 05:21)  Wt(kg): --     @ 07:  -   @ 07:00  --------------------------------------------------------  IN: 0 mL / OUT: 800 mL / NET: -800 mL     @ 07:01  -   @ 07:00  --------------------------------------------------------  IN: 341 mL / OUT: 0 mL / NET: 341 mL          PHYSICAL EXAM:  Constitutional: NAD  Neck: No JVD  Respiratory: CTAB, no wheezes, rales or rhonchi  Cardiovascular: S1, S2, RRR  Gastrointestinal: BS+, soft, NT/ND  Extremities: No cyanosis or clubbing. No peripheral edema  :  No sosa.   Skin: No rashes    LABS:      139  |  105  |  39<H>  ----------------------------<  127<H>  4.2   |  22  |  2.8<H>    Creatinine Trend: 2.8<--, 3.0<--, 3.7<--    Ca    8.3<L>      06 May 2021 04:30  Mg     2.0         TPro  5.4<L>  /  Alb  3.6  /  TBili  1.5<H>  /  DBili      /  AST  21  /  ALT  14  /  AlkPhos  134<H>                            9.7    8.29  )-----------( 213      ( 06 May 2021 04:30 )             31.0       Urine Studies:  Urinalysis Basic - ( 04 May 2021 13:51 )    Color: Light Yellow / Appearance: Clear / S.013 / pH:   Gluc:  / Ketone: Negative  / Bili: Negative / Urobili: <2 mg/dL   Blood:  / Protein: Trace / Nitrite: Negative   Leuk Esterase: Negative / RBC:  / WBC    Sq Epi:  / Non Sq Epi:  / Bacteria:       Protein/Creatinine Ratio Calculation: 0.2 Ratio ( @ 13:51)  Creatinine, Random Urine: 67 mg/dL ( @ 13:51)  Sodium, Random Urine: 60.0 mmoL/L ( @ 13:51)  Osmolality, Random Urine: 442 mos/kg ( @ 13:51)    RADIOLOGY & ADDITIONAL STUDIES:

## 2021-05-07 ENCOUNTER — TRANSCRIPTION ENCOUNTER (OUTPATIENT)
Age: 76
End: 2021-05-07

## 2021-05-07 ENCOUNTER — RESULT REVIEW (OUTPATIENT)
Age: 76
End: 2021-05-07

## 2021-05-07 VITALS
HEART RATE: 69 BPM | OXYGEN SATURATION: 99 % | SYSTOLIC BLOOD PRESSURE: 129 MMHG | DIASTOLIC BLOOD PRESSURE: 64 MMHG | RESPIRATION RATE: 18 BRPM

## 2021-05-07 LAB
A1C WITH ESTIMATED AVERAGE GLUCOSE RESULT: 5.5 % — SIGNIFICANT CHANGE UP (ref 4–5.6)
ALBUMIN SERPL ELPH-MCNC: 4 G/DL — SIGNIFICANT CHANGE UP (ref 3.5–5.2)
ALP SERPL-CCNC: 153 U/L — HIGH (ref 30–115)
ALT FLD-CCNC: 14 U/L — SIGNIFICANT CHANGE UP (ref 0–41)
ANION GAP SERPL CALC-SCNC: 11 MMOL/L — SIGNIFICANT CHANGE UP (ref 7–14)
AST SERPL-CCNC: 24 U/L — SIGNIFICANT CHANGE UP (ref 0–41)
BASOPHILS # BLD AUTO: 0.02 K/UL — SIGNIFICANT CHANGE UP (ref 0–0.2)
BASOPHILS NFR BLD AUTO: 0.2 % — SIGNIFICANT CHANGE UP (ref 0–1)
BILIRUB SERPL-MCNC: 1.6 MG/DL — HIGH (ref 0.2–1.2)
BUN SERPL-MCNC: 28 MG/DL — HIGH (ref 10–20)
CALCIUM SERPL-MCNC: 8.9 MG/DL — SIGNIFICANT CHANGE UP (ref 8.5–10.1)
CHLORIDE SERPL-SCNC: 103 MMOL/L — SIGNIFICANT CHANGE UP (ref 98–110)
CO2 SERPL-SCNC: 25 MMOL/L — SIGNIFICANT CHANGE UP (ref 17–32)
CREAT SERPL-MCNC: 2.3 MG/DL — HIGH (ref 0.7–1.5)
EOSINOPHIL # BLD AUTO: 0.15 K/UL — SIGNIFICANT CHANGE UP (ref 0–0.7)
EOSINOPHIL NFR BLD AUTO: 1.7 % — SIGNIFICANT CHANGE UP (ref 0–8)
ESTIMATED AVERAGE GLUCOSE: 111 MG/DL — SIGNIFICANT CHANGE UP (ref 68–114)
GLUCOSE BLDC GLUCOMTR-MCNC: 107 MG/DL — HIGH (ref 70–99)
GLUCOSE SERPL-MCNC: 105 MG/DL — HIGH (ref 70–99)
HCT VFR BLD CALC: 34.2 % — LOW (ref 42–52)
HGB BLD-MCNC: 10.4 G/DL — LOW (ref 14–18)
IMM GRANULOCYTES NFR BLD AUTO: 0.2 % — SIGNIFICANT CHANGE UP (ref 0.1–0.3)
LYMPHOCYTES # BLD AUTO: 2.08 K/UL — SIGNIFICANT CHANGE UP (ref 1.2–3.4)
LYMPHOCYTES # BLD AUTO: 23.3 % — SIGNIFICANT CHANGE UP (ref 20.5–51.1)
MAGNESIUM SERPL-MCNC: 2 MG/DL — SIGNIFICANT CHANGE UP (ref 1.8–2.4)
MCHC RBC-ENTMCNC: 28.7 PG — SIGNIFICANT CHANGE UP (ref 27–31)
MCHC RBC-ENTMCNC: 30.4 G/DL — LOW (ref 32–37)
MCV RBC AUTO: 94.5 FL — HIGH (ref 80–94)
MONOCYTES # BLD AUTO: 1.02 K/UL — HIGH (ref 0.1–0.6)
MONOCYTES NFR BLD AUTO: 11.4 % — HIGH (ref 1.7–9.3)
NEUTROPHILS # BLD AUTO: 5.63 K/UL — SIGNIFICANT CHANGE UP (ref 1.4–6.5)
NEUTROPHILS NFR BLD AUTO: 63.2 % — SIGNIFICANT CHANGE UP (ref 42.2–75.2)
NRBC # BLD: 0 /100 WBCS — SIGNIFICANT CHANGE UP (ref 0–0)
PLATELET # BLD AUTO: 231 K/UL — SIGNIFICANT CHANGE UP (ref 130–400)
POTASSIUM SERPL-MCNC: 4.1 MMOL/L — SIGNIFICANT CHANGE UP (ref 3.5–5)
POTASSIUM SERPL-SCNC: 4.1 MMOL/L — SIGNIFICANT CHANGE UP (ref 3.5–5)
PROT SERPL-MCNC: 6 G/DL — SIGNIFICANT CHANGE UP (ref 6–8)
RBC # BLD: 3.62 M/UL — LOW (ref 4.7–6.1)
RBC # FLD: 16.6 % — HIGH (ref 11.5–14.5)
SODIUM SERPL-SCNC: 139 MMOL/L — SIGNIFICANT CHANGE UP (ref 135–146)
WBC # BLD: 8.92 K/UL — SIGNIFICANT CHANGE UP (ref 4.8–10.8)
WBC # FLD AUTO: 8.92 K/UL — SIGNIFICANT CHANGE UP (ref 4.8–10.8)

## 2021-05-07 PROCEDURE — 88305 TISSUE EXAM BY PATHOLOGIST: CPT | Mod: 26

## 2021-05-07 PROCEDURE — 88312 SPECIAL STAINS GROUP 1: CPT | Mod: 26

## 2021-05-07 PROCEDURE — 99233 SBSQ HOSP IP/OBS HIGH 50: CPT

## 2021-05-07 RX ORDER — APIXABAN 2.5 MG/1
1 TABLET, FILM COATED ORAL
Qty: 0 | Refills: 0 | DISCHARGE

## 2021-05-07 RX ORDER — PANTOPRAZOLE SODIUM 20 MG/1
1 TABLET, DELAYED RELEASE ORAL
Qty: 60 | Refills: 0
Start: 2021-05-07 | End: 2021-06-05

## 2021-05-07 RX ORDER — ROSUVASTATIN CALCIUM 5 MG/1
1 TABLET ORAL
Qty: 0 | Refills: 0 | DISCHARGE

## 2021-05-07 RX ORDER — ASPIRIN/CALCIUM CARB/MAGNESIUM 324 MG
1 TABLET ORAL
Qty: 0 | Refills: 0 | DISCHARGE

## 2021-05-07 RX ADMIN — Medication 1 MILLIGRAM(S): at 12:52

## 2021-05-07 RX ADMIN — DRONEDARONE 400 MILLIGRAM(S): 400 TABLET, FILM COATED ORAL at 05:40

## 2021-05-07 RX ADMIN — Medication 1 TABLET(S): at 12:52

## 2021-05-07 RX ADMIN — PANTOPRAZOLE SODIUM 40 MILLIGRAM(S): 20 TABLET, DELAYED RELEASE ORAL at 05:40

## 2021-05-07 RX ADMIN — Medication 25 MILLIGRAM(S): at 05:40

## 2021-05-07 NOTE — DISCHARGE NOTE PROVIDER - NSDCMRMEDTOKEN_GEN_ALL_CORE_FT
aspirin 81 mg oral tablet, chewable: 1 tab(s) orally once a day  Eliquis 5 mg oral tablet: 1 tab(s) orally 2 times a day  febuxostat 40 mg oral tablet: 1 tab(s) orally once a day  ferrous sulfate 325 mg (65 mg elemental iron) oral tablet: 1 tab(s) orally 2 times a day  folic acid 1 mg oral tablet: 1 tab(s) orally once a day  Lasix 40 mg oral tablet: 1 tab(s) orally 2 times a day  Metoprolol Tartrate 25 mg oral tablet: 1 tab(s) orally 2 times a day  Multaq 400 mg oral tablet: 1 tab(s) orally 2 times a day  pioglitazone 30 mg oral tablet: 1 tab(s) orally once a day  rosuvastatin 20 mg oral tablet: 1 tab(s) orally once a day

## 2021-05-07 NOTE — DISCHARGE NOTE PROVIDER - HOSPITAL COURSE
76yo gentleman with a PMHx of HTN, HLD, T2DM, CAD s/p stents, CKD 4, Paroxysmal atrial fibrillation on Eliquis p/w Hgb of 7.0 from PMD office. Outpatient labwork with Dr. Kleiner reported a drop in his hemoglobin. He has been taking iron and having dark stool since starting it. Over the last 10 days ne noted increased dizziness and fatigue. His wife also noted he was pale. His last colonoscopy was ~5-6 years ago with Dr. Capps. he doesn't think he's ever had an endoscopy despite being hold he had an ulcer years ago.     #Acute on chronic anemia 2/2 to possible GIB - though Hgb currently stable s/p 2 units  - Hgb 7.3 on admission (down from 12) - s/p 2 unit pRBC - currently 10.4  - home asa and eliquis being held; cont IV ppi bid  - trend cbc, keep active T&S - transfuse <7.5  - s/p EGD/colonoscopy today     #KEEGAN on CKD IV - suspected pre-renal 2/2 GIB - seems to be at baseline  - Cr 2.8 today - (baseline ~3)   - pt non-oliguric  - Kidney/Bladder US w no hydro   - monitor CMP, renal function, UOP  - Nephro following    #CAD s/p PCI / HTN / HLD  - cont statin, bb    #Paroxsymal atrial fibrillation - rate controlled  - cont bb and multaq  - holding eliquis until Hgb stable    #DMII  - f/u A1c - monitor FS - add basal bolus insulin prn     #Gout  - on febuxostat at home    #Obesity (BMI 33)  - outpatient nutrition f/u     74yo gentleman with a PMHx of HTN, HLD, T2DM, CAD s/p stents, CKD 4, Paroxysmal atrial fibrillation on Eliquis p/w Hgb of 7.0 from PMD office. Outpatient labwork with Dr. Kleiner reported a drop in his hemoglobin. He has been taking iron and having dark stool since starting it. Over the last 10 days ne noted increased dizziness and fatigue. His wife also noted he was pale. His last colonoscopy was ~5-6 years ago with Dr. Capps. he doesn't think he's ever had an endoscopy despite being hold he had an ulcer years ago.     #Acute on chronic anemia 2/2 to possible GIB - though Hgb currently stable s/p 2 units  - Hgb 7.3 on admission (down from 12) - s/p 2 unit pRBC - currently 10.4  - home asa and eliquis being held; continued on IV ppi bid  - s/p EGD/colonoscopy today - showed erosive gastritis and 1 ulcer in antrum on stomach but no active bleed  - d/c on ppi bid and cont to hold eliquis for 2 weeks post discharge    #KEEGAN on CKD IV - suspected pre-renal 2/2 GIB - seems to be at baseline  - Cr 2.8 today - (baseline ~3)   - pt non-oliguric  - Kidney/Bladder US w no hydro   - monitor CMP, renal function, UOP  - Nephro following    #CAD s/p PCI / HTN / HLD  - cont statin, bb    #Paroxsymal atrial fibrillation - rate controlled  - cont bb and multaq  - holding eliquis until Hgb stable    #DMII  - f/u A1c - monitor FS - add basal bolus insulin prn     #Gout  - on febuxostat at home    #Obesity (BMI 33)  - outpatient nutrition f/u

## 2021-05-07 NOTE — CHART NOTE - NSCHARTNOTEFT_GEN_A_CORE
PACU ANESTHESIA ADMISSION NOTE      Procedure: EGD/Colonoscopy   Post op diagnosis:      ____  Intubated  TV:______       Rate: ______      FiO2: ______    _x___  Patent Airway    __x__  Full return of protective reflexes    __x__  Full recovery from anesthesia / back to baseline     Vitals:   T:   97.7        R:  16                 BP:  111/53                Sat:  98                 P:  73      Mental Status:  _x___ Awake   _____ Alert   _____ Drowsy   _____ Sedated    Nausea/Vomiting:  _x___ NO  ______Yes,   See Post - Op Orders          Pain Scale (0-10):  _____    Treatment: ____ None    __x__ See Post - Op/PCA Orders    Post - Operative Fluids:   ____ Oral   _x___ See Post - Op Orders    Plan: Discharge:   _x___Home       _____Floor     _____Critical Care    _____  Other:_________________    Comments:

## 2021-05-07 NOTE — DISCHARGE NOTE PROVIDER - NSDCFUADDINST_GEN_ALL_CORE_FT
Please resume all home medications. Please make follow up with your primary care doctor and GI doctor for further evaluation and monitoring.

## 2021-05-07 NOTE — DISCHARGE NOTE PROVIDER - NSDCCPCAREPLAN_GEN_ALL_CORE_FT
PRINCIPAL DISCHARGE DIAGNOSIS  Diagnosis: GI bleed  Assessment and Plan of Treatment: You sent in for a low hemoglobin. You were given 2 units of blood and your hemoglobin has since remained stable. You went for an EGD/Colonoscopy. You are cleared for discharged with outpatient follow up with GI and your primary care doctor.      SECONDARY DISCHARGE DIAGNOSES  Diagnosis: Symptomatic anemia  Assessment and Plan of Treatment: You presenting symotoms were d/t anemia is a condition in which the concentration of red blood cells or hemoglobin in the blood is below normal. Hemoglobin is a substance in red blood cells that carries oxygen to the tissues of the body. Anemia results in not enough oxygen reaching these tissues which can cause symptoms such as weakness, dizziness/lightheadedness, shortness of breath, chest pain, paleness, or nausea. You received two units of blood during hospitalization and  your hemoglobin levels have improved and are currently stable.     PRINCIPAL DISCHARGE DIAGNOSIS  Diagnosis: GI bleed  Assessment and Plan of Treatment: You sent in for a low hemoglobin. You were given 2 units of blood and your hemoglobin has since remained stable. You went for an EGD/Colonoscopy which showed erosive gastritis and 1 ulcer in antrum on stomach but no active bleed. There was also a polyp that was removed and biopsy taken. You are currently cleared for discharge with the following instructions: please do not take eliquis for the next 2 weeks and please take protonix twice a day going forward. You will need to follow up with your GI doctor after discharge and schedule a repeat scope in 8-12 weeks.      SECONDARY DISCHARGE DIAGNOSES  Diagnosis: Symptomatic anemia  Assessment and Plan of Treatment: You presenting symotoms were d/t anemia is a condition in which the concentration of red blood cells or hemoglobin in the blood is below normal. Hemoglobin is a substance in red blood cells that carries oxygen to the tissues of the body. Anemia results in not enough oxygen reaching these tissues which can cause symptoms such as weakness, dizziness/lightheadedness, shortness of breath, chest pain, paleness, or nausea. You received two units of blood during hospitalization and  your hemoglobin levels have improved and are currently stable.    Diagnosis: CAD (coronary artery disease)  Assessment and Plan of Treatment: Please resume taking your aspirin, statin and metopolol after discharge.    Diagnosis: Atrial fibrillation  Assessment and Plan of Treatment: Please continue to take your multaq and metoprolol after discharge, BUT PLEASE DO NOT TAKE ELIQUIS FOR THE 2 WEEKS AS THIS CAN CAUSE REBLEEDING. Follow up with your primary care doctor in 2 weeks regarding resumption and for further evaluation/management.

## 2021-05-07 NOTE — PROGRESS NOTE ADULT - ASSESSMENT
76yo gentleman with a PMHx of HTN, HLD, T2DM, CAD s/p stents, CKD 4, Paroxysmal atrial fibrillation on Eliquis p/w Hgb of 7.0 from PMD office. Outpatient labwork with Dr. Kleiner reported a drop in his hemoglobin. He has been taking iron and having dark stool since starting it. Over the last 10 days ne noted increased dizziness and fatigue. His wife also noted he was pale. His last colonoscopy was ~5-6 years ago with Dr. Capps. he doesn't think he's ever had an endoscopy despite being hold he had an ulcer years ago.     #Acute on chronic anemia 2/2 to possible GIB - though Hgb currently stable s/p 2 units  - Hgb 7.3 on admission (down from 12) - s/p 2 unit pRBC - currently 9.7  - home asa and eliquis being held; cont IV ppi bid  - trend cbc, keep active T&S - transfuse <7.5  - will be going to EGD/colonoscopy tomorrow w GI    #KEEGAN on CKD IV - suspected pre-renal 2/2 GIB - seems to be at baseline  - Cr 2.8 today - (baseline ~3)   - pt non-oliguric  - Kidney/Bladder US w no hydro   - f/u Deneen/Cr/Urea  - monitor CMP, renal function, UOP  - Nephro following    #CAD s/p PCI / HTN / HLD  - cont statin, bb    #Paroxsymal atrial fibrillation - rate controlled  - cont bb and multaq  - holding eliquis until Hgb stable    #DMII  - f/u A1c - monitor FS - add basal bolus insulin prn     #Gout  - on febuxostat at home    #Obesity (BMI 33)  - outpatient nutrition f/u    DVT ppx: SCD for now   GI ppx: Protonix IV BID   Diet: DASH  Activity: IAT    FULL CODE
74yo gentleman with a PMHx of HTN, HLD, T2DM, CAD s/p stents, CKD 4, Paroxysmal atrial fibrillation on Eliquis p/w Hgb of 7.0 from PMD office. Outpatient labwork with Dr. Kleiner reported a drop in his hemoglobin. He has been taking iron and having dark stool since starting it. Over the last 10 days ne noted increased dizziness and fatigue. His wife also noted he was pale. His last colonoscopy was ~5-6 years ago with Dr. Capps. he doesn't think he's ever had an endoscopy despite being hold he had an ulcer years ago.     #Acute on chronic anemia - r/o Upper GIB  - Hgb 7.3 on admission (down from 12) - s/p 1 unit pRBC  - home asa and eliquis being held; cont IV ppi bid  - trend cbc, keep active T&S - transfuse <7.5  - GI Eval for endoscopy     #KEEGAN on CKD IV - suspected pre-renal 2/2 GIB  - Cr 3.7 - (baseline ~3)  - pt non-oliguric (~1100cc output last 24 hours)  - Kidney/Bladder US w no hydro   - starting lasix given fluid overload on exam - monitor for intravascular depletion    - f/u Deneen/Cr/Urea  - monitor CMP, renal function, UOP  - Nephro following    #CAD s/p PCI / HTN / HLD  - cont statin, bb    #Paroxsymal atrial fibrillation - rate controlled  - cont bb and multaq  - holding eliquis until Hgb stable    #DMII  - f/u A1c - monitor FS - add basal bolus insulin prn     #Gout  - on febuxostat at home    #Obesity (BMI 33)  - outpatient nutrition f/u    DVT ppx: SCD for now   GI ppx: Protonix IV BID   Diet: DASH  Activity: IAT    FULL CODE
Mr. Agustin is a 75 years old male patient with PMHx of HTN, HLD, T2DM, CAD s/p stents, CKD 4, Paroxysmal atrial fibrillation on Eliquis referred to ED due to Hgb of 7.0 from PMD office. Nephrology consulted for KEEGAN on CKD stage 4.    # KEEGAN on CKD stage 4.  creat improving at baseline today   - likely to be prerenal given suspected GI bleed, Hb drop 12---->7 (s/p 1u of PRBC on 5/4) repeat Hb better   - Non-oliguric: strict I and Ofollow UO   - No hyperkalemia (K: 4.2), no metabolic acidosis (CO2: 23), no shortness of breath on exam.   - Kidney US: no hydronephrosis. Prevoid volume of approximately 350 cc.  Postvoid volume is approximately 15 cc.  sp EGD with erosive gastritis   will follow with Dr Kleiner as OP  
Mr. Agustin is a 75 years old male patient with PMHx of HTN, HLD, T2DM, CAD s/p stents, CKD 4, Paroxysmal atrial fibrillation on Eliquis referred to ED due to Hgb of 7.0 from PMD office. Nephrology consulted for KEEGAN on CKD stage 4.    # KEEGAN on CKD stage 4.  creat improving at baseline today   - likely to be prerenal given suspected GI bleed, Hb drop 12---->7 (s/p 1u of PRBC on 5/4) repeat Hb better   - Non-oliguric: strict I and Ofollow UO   - No hyperkalemia (K: 4.2), no metabolic acidosis (CO2: 23), no shortness of breath on exam.   - Kidney US: no hydronephrosis. Prevoid volume of approximately 350 cc.  Postvoid volume is approximately 15 cc.  for EGD colonoscopy in AM   will follow

## 2021-05-07 NOTE — DISCHARGE NOTE PROVIDER - CARE PROVIDER_API CALL
Kleiner, Morton J (MD)  Internal Medicine; Nephrology  [address] [place] [address]  Phone: [phone]  Fax: [phone]  Follow Up Time:     Tom Crenshaw)  Gastroenterology; Internal Medicine  [address] [place] [address]  Phone: [phone]  Fax: [phone]  Follow Up Time:

## 2021-05-07 NOTE — PROGRESS NOTE ADULT - SUBJECTIVE AND OBJECTIVE BOX
Nephrology progress note    THIS IS AN INCOMPLETE NOTE . FULL NOTE TO FOLLOW SHORTLY    Patient is seen and examined, events over the last 24 h noted .    Allergies:  No Known Allergies    Hospital Medications:   MEDICATIONS  (STANDING):  atorvastatin 80 milliGRAM(s) Oral at bedtime  bisacodyl 20 milliGRAM(s) Oral at bedtime  chlorhexidine 4% Liquid 1 Application(s) Topical <User Schedule>  dronedarone 400 milliGRAM(s) Oral two times a day  folic acid 1 milliGRAM(s) Oral daily  metoprolol tartrate 25 milliGRAM(s) Oral two times a day  multivitamin 1 Tablet(s) Oral daily  pantoprazole  Injectable 40 milliGRAM(s) IV Push two times a day        VITALS:  T(F): 98.8 (21 @ 10:23), Max: 98.8 (21 @ 10:23)  HR: 53 (21 @ 10:24)  BP: 135/59 (21 10:24)  RR: 18 (21 @ 10:24)  SpO2: 99% (21 @ 10:24)  Wt(kg): --     @ 07:01  -   @ 07:00  --------------------------------------------------------  IN: 341 mL / OUT: 0 mL / NET: 341 mL      Height (cm): 172.7 (05-07 @ 10:24)  Weight (kg): 99.2 (05-07 @ 10:24)  BMI (kg/m2): 33.3 (05-07 @ 10:24)  BSA (m2): 2.12 (05-07 @ 10:)    PHYSICAL EXAM:  Constitutional: NAD  HEENT: anicteric sclera, oropharynx clear, MMM  Neck: No JVD  Respiratory: CTAB, no wheezes, rales or rhonchi  Cardiovascular: S1, S2, RRR  Gastrointestinal: BS+, soft, NT/ND  Extremities: No cyanosis or clubbing. No peripheral edema  :  No sosa.   Skin: No rashes    LABS:      139  |  103  |  28<H>  ----------------------------<  105<H>  4.1   |  25  |  2.3<H>    Ca    8.9      07 May 2021 07:18  Mg     2.0         TPro  6.0  /  Alb  4.0  /  TBili  1.6<H>  /  DBili      /  AST  24  /  ALT  14  /  AlkPhos  153<H>                            10.4   8.92  )-----------( 231      ( 07 May 2021 07:18 )             34.2       Urine Studies:  Urinalysis Basic - ( 04 May 2021 13:51 )    Color: Light Yellow / Appearance: Clear / S.013 / pH:   Gluc:  / Ketone: Negative  / Bili: Negative / Urobili: <2 mg/dL   Blood:  / Protein: Trace / Nitrite: Negative   Leuk Esterase: Negative / RBC:  / WBC    Sq Epi:  / Non Sq Epi:  / Bacteria:       Sodium, Random Urine: 73.0 mmoL/L ( @ 10:14)  Creatinine, Random Urine: 82 mg/dL ( @ 10:14)  Protein/Creatinine Ratio Calculation: 0.2 Ratio ( @ 13:51)  Creatinine, Random Urine: 67 mg/dL ( @ 13:51)  Sodium, Random Urine: 60.0 mmoL/L ( @ 13:51)  Osmolality, Random Urine: 442 mos/kg ( @ 13:51)    RADIOLOGY & ADDITIONAL STUDIES:   Nephrology progress note  Patient is seen and examined, events over the last 24 h noted .  sp EGD today   no melena no hematemesis    Allergies:  No Known Allergies    Hospital Medications:   MEDICATIONS  (STANDING):  atorvastatin 80 milliGRAM(s) Oral at bedtime  bisacodyl 20 milliGRAM(s) Oral at bedtime  dronedarone 400 milliGRAM(s) Oral two times a day  folic acid 1 milliGRAM(s) Oral daily  metoprolol tartrate 25 milliGRAM(s) Oral two times a day  multivitamin 1 Tablet(s) Oral daily  pantoprazole  Injectable 40 milliGRAM(s) IV Push two times a day        VITALS:  T(F): 98.8 (21 @ 10:23), Max: 98.8 (21 @ 10:23)  HR: 53 (21 @ 10:24)  BP: 135/59 (21 @ 10:24)  RR: 18 (21 10:24)  SpO2: 99% (05-07-21 @ 10:24)       07:01  -   @ 07:00  --------------------------------------------------------  IN: 341 mL / OUT: 0 mL / NET: 341 mL      Height (cm): 172.7 (05-07 @ 10:24)  Weight (kg): 99.2 (05-07 @ 10:24)  BMI (kg/m2): 33.3 (05-07 @ 10:)  BSA (m2): 2.12 (05-07 @ 10:24)    PHYSICAL EXAM:  Constitutional: NAD  Neck: No JVD  Respiratory: CTAB,   Cardiovascular: S1, S2, RRR  Gastrointestinal: BS+, soft, NT/ND  Extremities: No cyanosis or clubbing. No peripheral edema  :  No sosa.   Skin: No rashes    LABS:      139  |  103  |  28<H>  ----------------------------<  105<H>  4.1   |  25  |  2.3<H>    Ca    8.9      07 May 2021 07:18  Mg     2.0         TPro  6.0  /  Alb  4.0  /  TBili  1.6<H>  /  DBili      /  AST  24  /  ALT  14  /  AlkPhos  153<H>                            10.4   8.92  )-----------( 231      ( 07 May 2021 07:18 )             34.2       Urine Studies:  Urinalysis Basic - ( 04 May 2021 13:51 )    Color: Light Yellow / Appearance: Clear / S.013 / pH:   Gluc:  / Ketone: Negative  / Bili: Negative / Urobili: <2 mg/dL   Blood:  / Protein: Trace / Nitrite: Negative   Leuk Esterase: Negative / RBC:  / WBC    Sq Epi:  / Non Sq Epi:  / Bacteria:       Sodium, Random Urine: 73.0 mmoL/L ( @ 10:14)  Creatinine, Random Urine: 82 mg/dL ( @ 10:14)  Protein/Creatinine Ratio Calculation: 0.2 Ratio ( @ 13:51)  Creatinine, Random Urine: 67 mg/dL ( @ 13:51)  Sodium, Random Urine: 60.0 mmoL/L ( @ 13:51)  Osmolality, Random Urine: 442 mos/kg ( @ 13:51)    RADIOLOGY & ADDITIONAL STUDIES:

## 2021-05-10 LAB — SURGICAL PATHOLOGY STUDY: SIGNIFICANT CHANGE UP

## 2021-05-11 LAB — SURGICAL PATHOLOGY STUDY: SIGNIFICANT CHANGE UP

## 2021-05-13 DIAGNOSIS — I25.10 ATHEROSCLEROTIC HEART DISEASE OF NATIVE CORONARY ARTERY WITHOUT ANGINA PECTORIS: ICD-10-CM

## 2021-05-13 DIAGNOSIS — K25.4 CHRONIC OR UNSPECIFIED GASTRIC ULCER WITH HEMORRHAGE: ICD-10-CM

## 2021-05-13 DIAGNOSIS — E87.70 FLUID OVERLOAD, UNSPECIFIED: ICD-10-CM

## 2021-05-13 DIAGNOSIS — N17.9 ACUTE KIDNEY FAILURE, UNSPECIFIED: ICD-10-CM

## 2021-05-13 DIAGNOSIS — E78.5 HYPERLIPIDEMIA, UNSPECIFIED: ICD-10-CM

## 2021-05-13 DIAGNOSIS — K63.5 POLYP OF COLON: ICD-10-CM

## 2021-05-13 DIAGNOSIS — D62 ACUTE POSTHEMORRHAGIC ANEMIA: ICD-10-CM

## 2021-05-13 DIAGNOSIS — K29.01 ACUTE GASTRITIS WITH BLEEDING: ICD-10-CM

## 2021-05-13 DIAGNOSIS — E11.22 TYPE 2 DIABETES MELLITUS WITH DIABETIC CHRONIC KIDNEY DISEASE: ICD-10-CM

## 2021-05-13 DIAGNOSIS — I95.1 ORTHOSTATIC HYPOTENSION: ICD-10-CM

## 2021-05-13 DIAGNOSIS — I48.0 PAROXYSMAL ATRIAL FIBRILLATION: ICD-10-CM

## 2021-05-13 DIAGNOSIS — Z87.442 PERSONAL HISTORY OF URINARY CALCULI: ICD-10-CM

## 2021-05-13 DIAGNOSIS — I25.2 OLD MYOCARDIAL INFARCTION: ICD-10-CM

## 2021-05-13 DIAGNOSIS — Z86.16 PERSONAL HISTORY OF COVID-19: ICD-10-CM

## 2021-05-13 DIAGNOSIS — M10.9 GOUT, UNSPECIFIED: ICD-10-CM

## 2021-05-13 DIAGNOSIS — E83.51 HYPOCALCEMIA: ICD-10-CM

## 2021-05-13 DIAGNOSIS — R13.10 DYSPHAGIA, UNSPECIFIED: ICD-10-CM

## 2021-05-13 DIAGNOSIS — K92.2 GASTROINTESTINAL HEMORRHAGE, UNSPECIFIED: ICD-10-CM

## 2021-05-13 DIAGNOSIS — I12.9 HYPERTENSIVE CHRONIC KIDNEY DISEASE WITH STAGE 1 THROUGH STAGE 4 CHRONIC KIDNEY DISEASE, OR UNSPECIFIED CHRONIC KIDNEY DISEASE: ICD-10-CM

## 2021-05-13 DIAGNOSIS — N18.4 CHRONIC KIDNEY DISEASE, STAGE 4 (SEVERE): ICD-10-CM

## 2021-05-13 DIAGNOSIS — Z95.5 PRESENCE OF CORONARY ANGIOPLASTY IMPLANT AND GRAFT: ICD-10-CM

## 2021-05-13 DIAGNOSIS — E66.9 OBESITY, UNSPECIFIED: ICD-10-CM

## 2021-05-13 DIAGNOSIS — K64.9 UNSPECIFIED HEMORRHOIDS: ICD-10-CM

## 2021-06-18 ENCOUNTER — OUTPATIENT (OUTPATIENT)
Dept: OUTPATIENT SERVICES | Facility: HOSPITAL | Age: 76
LOS: 1 days | Discharge: HOME | End: 2021-06-18

## 2021-06-18 DIAGNOSIS — Z11.59 ENCOUNTER FOR SCREENING FOR OTHER VIRAL DISEASES: ICD-10-CM

## 2021-06-18 DIAGNOSIS — Z95.5 PRESENCE OF CORONARY ANGIOPLASTY IMPLANT AND GRAFT: Chronic | ICD-10-CM

## 2021-06-21 ENCOUNTER — OUTPATIENT (OUTPATIENT)
Dept: OUTPATIENT SERVICES | Facility: HOSPITAL | Age: 76
LOS: 1 days | Discharge: HOME | End: 2021-06-21
Payer: MEDICARE

## 2021-06-21 ENCOUNTER — TRANSCRIPTION ENCOUNTER (OUTPATIENT)
Age: 76
End: 2021-06-21

## 2021-06-21 ENCOUNTER — RESULT REVIEW (OUTPATIENT)
Age: 76
End: 2021-06-21

## 2021-06-21 VITALS
WEIGHT: 214.95 LBS | SYSTOLIC BLOOD PRESSURE: 198 MMHG | RESPIRATION RATE: 20 BRPM | TEMPERATURE: 99 F | HEIGHT: 68 IN | HEART RATE: 57 BPM | DIASTOLIC BLOOD PRESSURE: 92 MMHG

## 2021-06-21 VITALS
OXYGEN SATURATION: 96 % | DIASTOLIC BLOOD PRESSURE: 70 MMHG | SYSTOLIC BLOOD PRESSURE: 158 MMHG | RESPIRATION RATE: 20 BRPM | HEART RATE: 54 BPM

## 2021-06-21 DIAGNOSIS — Z95.5 PRESENCE OF CORONARY ANGIOPLASTY IMPLANT AND GRAFT: Chronic | ICD-10-CM

## 2021-06-21 PROCEDURE — 88305 TISSUE EXAM BY PATHOLOGIST: CPT | Mod: 26

## 2021-06-21 PROCEDURE — 88312 SPECIAL STAINS GROUP 1: CPT | Mod: 26

## 2021-06-21 RX ORDER — PIOGLITAZONE HYDROCHLORIDE 15 MG/1
1 TABLET ORAL
Qty: 0 | Refills: 0 | DISCHARGE

## 2021-06-21 RX ORDER — FEBUXOSTAT 40 MG/1
1 TABLET ORAL
Qty: 0 | Refills: 0 | DISCHARGE

## 2021-06-21 RX ORDER — ROSUVASTATIN CALCIUM 5 MG/1
1 TABLET ORAL
Qty: 0 | Refills: 0 | DISCHARGE

## 2021-06-21 RX ORDER — ASPIRIN/CALCIUM CARB/MAGNESIUM 324 MG
1 TABLET ORAL
Qty: 0 | Refills: 0 | DISCHARGE

## 2021-06-21 RX ORDER — FUROSEMIDE 40 MG
1 TABLET ORAL
Qty: 0 | Refills: 0 | DISCHARGE

## 2021-06-21 RX ORDER — DRONEDARONE 400 MG/1
1 TABLET, FILM COATED ORAL
Qty: 0 | Refills: 0 | DISCHARGE

## 2021-06-21 RX ORDER — METOPROLOL TARTRATE 50 MG
1 TABLET ORAL
Qty: 0 | Refills: 0 | DISCHARGE

## 2021-06-21 RX ORDER — FERROUS SULFATE 325(65) MG
1 TABLET ORAL
Qty: 0 | Refills: 0 | DISCHARGE

## 2021-06-21 RX ORDER — FOLIC ACID 0.8 MG
1 TABLET ORAL
Qty: 0 | Refills: 0 | DISCHARGE

## 2021-06-21 RX ORDER — APIXABAN 2.5 MG/1
1 TABLET, FILM COATED ORAL
Qty: 0 | Refills: 0 | DISCHARGE

## 2021-06-21 NOTE — H&P PST ADULT - HISTORY OF PRESENT ILLNESS
75 male with recent h/o bleeding gastric ulcer is here for EGD to evaluate for healing of the ulcer

## 2021-06-21 NOTE — H&P PST ADULT - ASSESSMENT
75 male with recent h/o bleeding gastric ulcer is here for EGD to evaluate for healing of the ulcer   Plan   Proceed to EGD

## 2021-06-22 LAB — SURGICAL PATHOLOGY STUDY: SIGNIFICANT CHANGE UP

## 2021-06-25 DIAGNOSIS — E78.00 PURE HYPERCHOLESTEROLEMIA, UNSPECIFIED: ICD-10-CM

## 2021-06-25 DIAGNOSIS — K25.9 GASTRIC ULCER, UNSPECIFIED AS ACUTE OR CHRONIC, WITHOUT HEMORRHAGE OR PERFORATION: ICD-10-CM

## 2021-06-25 DIAGNOSIS — Z79.82 LONG TERM (CURRENT) USE OF ASPIRIN: ICD-10-CM

## 2021-06-25 DIAGNOSIS — K29.50 UNSPECIFIED CHRONIC GASTRITIS WITHOUT BLEEDING: ICD-10-CM

## 2021-06-25 DIAGNOSIS — I10 ESSENTIAL (PRIMARY) HYPERTENSION: ICD-10-CM

## 2021-06-25 DIAGNOSIS — K31.9 DISEASE OF STOMACH AND DUODENUM, UNSPECIFIED: ICD-10-CM

## 2021-06-25 DIAGNOSIS — I25.10 ATHEROSCLEROTIC HEART DISEASE OF NATIVE CORONARY ARTERY WITHOUT ANGINA PECTORIS: ICD-10-CM

## 2021-06-25 DIAGNOSIS — Z79.01 LONG TERM (CURRENT) USE OF ANTICOAGULANTS: ICD-10-CM

## 2021-06-25 DIAGNOSIS — Z95.5 PRESENCE OF CORONARY ANGIOPLASTY IMPLANT AND GRAFT: ICD-10-CM

## 2021-10-14 NOTE — PROGRESS NOTE ADULT - PROVIDER SPECIALTY LIST ADULT
Hospitalist NOTIFICATION RETURN TO WORK     10/14/2021    Mr. Farhan Sanchez  [id number] [facility] [phone] [id number]      To Whom It May Concern:    Farhan Sanchez was admitted for COVID-19 infection on 9/20/2021-9/24/2021. Patient has been home recovering since discharge. He is now stable to return to work. He may return to work on 10/18/2021. I recommend return without restrictions    If there are questions or concerns, please have the patient contact our office.         Sincerely,        Michelle Olsen MD

## 2021-12-02 ENCOUNTER — APPOINTMENT (OUTPATIENT)
Dept: OTOLARYNGOLOGY | Facility: CLINIC | Age: 76
End: 2021-12-02
Payer: MEDICARE

## 2021-12-02 VITALS — BODY MASS INDEX: 32.58 KG/M2 | WEIGHT: 215 LBS | HEIGHT: 68 IN

## 2021-12-02 PROCEDURE — 99203 OFFICE O/P NEW LOW 30 MIN: CPT

## 2021-12-02 RX ORDER — ASPIRIN 81 MG
6.5 TABLET, DELAYED RELEASE (ENTERIC COATED) ORAL
Qty: 1 | Refills: 0 | Status: ACTIVE | COMMUNITY
Start: 2021-12-02 | End: 1900-01-01

## 2021-12-02 NOTE — HISTORY OF PRESENT ILLNESS
[de-identified] : Patient presents today with c/o hearing loss.    He has been having hearing problem , ear feels stuffed. Left ear is worst then right ear . No recent audiogram.  No history of ear infections.    No tinnitus  in pressure in ears

## 2021-12-23 ENCOUNTER — OUTPATIENT (OUTPATIENT)
Dept: OUTPATIENT SERVICES | Facility: HOSPITAL | Age: 76
LOS: 1 days | Discharge: HOME | End: 2021-12-23
Payer: MEDICARE

## 2021-12-23 ENCOUNTER — RESULT REVIEW (OUTPATIENT)
Age: 76
End: 2021-12-23

## 2021-12-23 ENCOUNTER — APPOINTMENT (OUTPATIENT)
Dept: OTOLARYNGOLOGY | Facility: CLINIC | Age: 76
End: 2021-12-23
Payer: MEDICARE

## 2021-12-23 DIAGNOSIS — Z95.5 PRESENCE OF CORONARY ANGIOPLASTY IMPLANT AND GRAFT: Chronic | ICD-10-CM

## 2021-12-23 DIAGNOSIS — M54.50 LOW BACK PAIN, UNSPECIFIED: ICD-10-CM

## 2021-12-23 DIAGNOSIS — M25.552 PAIN IN LEFT HIP: ICD-10-CM

## 2021-12-23 PROCEDURE — 99212 OFFICE O/P EST SF 10 MIN: CPT | Mod: 25

## 2021-12-23 PROCEDURE — 72110 X-RAY EXAM L-2 SPINE 4/>VWS: CPT | Mod: 26

## 2021-12-23 PROCEDURE — 69210 REMOVE IMPACTED EAR WAX UNI: CPT

## 2021-12-23 PROCEDURE — 73502 X-RAY EXAM HIP UNI 2-3 VIEWS: CPT | Mod: 26,LT

## 2021-12-23 NOTE — PHYSICAL EXAM
[Normal] : mucosa is normal [Midline] : trachea located in midline position [de-identified] : bilateral cerumen impaction

## 2022-04-26 NOTE — ED PROVIDER NOTE - PROGRESS NOTE DETAILS
MQ: Rectal exam shows black stool, consented for blood transfusion, will obtain labs, and reassess, o/w hemodynamically stable PMD Dr. Kleiner, phone number: [phone] No MQ: Hgb 7.3, will give 1 unit of PRBC, will give IV PTX, will admit

## 2022-05-05 ENCOUNTER — APPOINTMENT (OUTPATIENT)
Dept: OTOLARYNGOLOGY | Facility: CLINIC | Age: 77
End: 2022-05-05
Payer: MEDICARE

## 2022-05-05 PROCEDURE — 69210 REMOVE IMPACTED EAR WAX UNI: CPT

## 2022-05-05 PROCEDURE — 99213 OFFICE O/P EST LOW 20 MIN: CPT | Mod: 25

## 2022-05-05 NOTE — PHYSICAL EXAM
[Normal] : mucosa is normal [Midline] : trachea located in midline position [de-identified] : bilateral cerumen impaction removed with curette and suction

## 2022-07-25 NOTE — ED ADULT TRIAGE NOTE - BSA (M2)
Subjective:     Diabetes Mellitus Type 2: Current symptoms/problems include none. Home blood sugar records:  trend: stable  Any episodes of hypoglycemia? no  Tobacco history: He  reports that he has never smoked. He has never used smokeless tobacco.   Known diabetic complications: none    Hypertension:  Home blood pressure monitoring: No.  He is adherent to a low sodium diet. Antihypertensive medication side effects: no medication side effects noted. Use of agents associated with hypertension: none. Hyperlipidemia:  No new myalgias or GI upset on OTC Fish Oil. Prostate cancer: He has been diagnosed with prostate cancer in the past month. He will be having the prostate removed in August.   No new urinary symptoms reported. Was told that it can take 1-2 months for initial recover. Was told that it would take a year most likely for urinary control to return. He saw an oncologist to discuss treatment options. He has learned that radiation would be all that would be done if surgery was declined. Side effects could be worse than the surgery. He has therefore opted for surgery for removal of Prostate. Bone scan showed no metastasis. He has not had any change in general health overall. Hypothyroidism: The patient reports no heat or cold intolerance, good energy levels and no hair loss or excessive skin dryness. Vitamin deficiencies: The patient feels that he overall gets a balanced diet with no recent changes in appetite. Continues to take vitamin supplementation without issue. Does get daily weightbearing activity. No new onset bone pain reported. The five diabetic measures for control:   Hemoglobin A1C (%)   Date Value   07/22/2022 7.3 (H)     LDL Calculated (mg/dL)   Date Value   07/22/2022 see below         Blood pressure less than 131/81,   BP Readings from Last 1 Encounters:   07/25/22 138/74     Smoking, non smoker is goal. This pt is not a smoker.     Last eye exam was 2022  Last diabetic foot exam was 2022  Last urine microalbumin creatinine ratio was 2022    PMH: This 80 y.o. male  patient is  hypertensive, is  diabetic, is  hyperlipidemic. He has no history of smoking and has a  family history of heart disease. He is  obese. Review of Systems  Patient denies chest pain, shortness of breath, headache, lightheadedness, and palpitations. Eyes: no rapid change in vision  Ears, nose, mouth, throat, and face: no changes in hearing or sore throat  Respiratory: No shortness of breath or cough. Cardiovascular: no chest pain or pressure. This patient reports no polyuria, polydipsia or episodes of hypoglycemia. Treatment Adherence:   Medication compliance:  compliant most of the time  Diet compliance:  compliant most of the time  Weight trend: stable  Current exercise: no regular exercise  What might prevent you from meeting your goal?: none  Patient plan for overcoming barriers: recommend 150 minutes of exercise per week or 30 minutes/day for 5 days a week. Patient Confidence: 8/10      Objective:   EXAM:  Constitutional Blood pressure 138/74, pulse 74, temperature (!) 96 °F (35.6 °C), temperature source Temporal, resp. rate 14, height 6' 2\" (1.88 m), weight 248 lb 3.2 oz (112.6 kg), SpO2 96 %. .  He has a normal affect, no acute distress, appears well developed and well nourished. Neck:  neck- supple, no mass, non-tender and no bruits. Lungs:  Normal expansion. Clear to auscultation. No rales, rhonchi, or wheezing., No chest wall tenderness. Heart:  Heart sounds are normal.  Regular rate and rhythm without murmur, gallop or rub. Abdomen:  Soft, non-tender, normal bowel sounds. No bruits, organomegaly or masses. Extremities: Extremities warm to touch, pink, with no edema. Dorsalis pedis and posterior tibial pulses are symmetric. No fissures between the toes. No open sores on the feet. Sensation intact to monofilament testing in 8 of 8 areas tested.  No edema in feet.  Radial pulses strong and symmetric. No edema in hands or wrists. Assessment:      Diagnosis Orders   1. Medicare annual wellness visit, subsequent        2. Type 2 diabetes mellitus without complication, without long-term current use of insulin ([facility])  CBC with Auto Differential    Comprehensive Metabolic Panel    Lipid Panel    Hemoglobin A1C    Microalbumin / Creatinine Urine Ratio     DIABETES FOOT EXAM      3. Hypertriglyceridemia        4. Essential hypertension        5. Hypothyroidism, unspecified type  TSH    T4, Free    SYNTHROID 25 MCG tablet      6. Vitamin D deficiency  Vitamin D 25 Hydroxy      7. B12 deficiency  Vitamin B12 & Folate      8. Elevated PSA  PSA, Diagnostic      9. Prostate cancer [facility])            PLAN: Include orders in the DX section. Diabetes Counseling   Patient was counseled regarding disease risks and adopting healthy behaviors. Patient was provided education materials to assist with self management. Patient was provided log (or received log during previous visit) to record blood pressure, food intake and/or blood sugar. Patient was instructed to keep log up-to-date and to always bring log to all office visits. Follow up: 4 months and as needed. Blood work one week prior as ordered. 1.  Also see AWV note from today. 2.  Diabetes is stable but with increased hemoglobin A1c now at 7.3. Recommend continuing current medication. We will repeat blood work in the fall to determine if medication adjustment would be needed. Continue increasing physical activity levels and limiting starches and simple sugars in the diet. 3. 4.  Blood pressure is well controlled and lipid panel is overall stable but with more elevated triglycerides. He continues to take fish oil routinely. We discussed that triglycerides generally reduce with glucose levels. Notify me or go to emergency room with any significant abdominal pain symptoms should occur.   Triglyceride levels greater than 500 can increase risk of pancreatitis. LDL is at treatment goal.    5.  Thyroid function is overall stable on brand-name Synthroid. Letter of medical necessity written for his insurance company to cover cost more effectively. He may choose a different supplement plan for next year. No new onset symptoms reported. 6.  7.  Vitamin levels are stable on current supplementation. Continue the same. No side effects reported and continue balanced diet and weightbearing activity daily. 8.  9.  Records to be requested from [facility] urology. Plans to undergo prostatectomy next month. No urinary symptoms reported. Brief summary of care reviewed through electronic documentation system. Please note this report has been partially produced using speech recognition software and may cause contain errors related to that system including grammar, punctuation and spelling as well as words and phrases that may seem inappropriate. If there are questions or concerns please feel free to contact me to clarify.       Electronically signed by MIRIAM Negro - CNP-CNP, 11:46 AM 7/25/22 1.92

## 2022-10-01 NOTE — PROGRESS NOTE ADULT - ASSESSMENT
73 yo male with PMH HTN, HLD, CAD s/p stent presents c/o cough x 1 week. Pt states he had low grade temp and has had slight decreased appetite, was having some SOB .  Pt was admitted to ICU diagnosed with  Sepsis POA,, Acute Hypoxic, respiratory failure  sec to viral pneumonia, sec to COVID -19, Pt extubated on 4/12, Rapid afib S/p cardizem drip and BB, Hypertensive emergency S/p cleviprex drip, Acute Kidney injury on CKD stage 3    # Sepsis POA-resolved  # Acute Hypoxic resp failure sec to viral pneumonia sec to COVID 19-resolving  - S/p extubation on 4/12  -  Xray Chest 1 View- PORTABLE-Routine (04.16.20 @ 03:58) >No significant change in bilateral opacities.  - COVID-19 PCR: Detected(03.25.20 @ 23:27)  - Procalcitonin, Serum: 0.23ng/mL (04.16.20 @ 00:20)  -Fibrinogen Assay: 316.0 mg/dL (04.10.20 @ 00:58)  -D-Dimer Assay, Quantitative: 516 ng/mL DDU (04.16.20 @ 00:20)  - C-Reactive Protein, Serum: 4.20 mg/dL (04.16.20 @ 00:20)  - s/p azithromycin/plaqeunil/ Anakinra 100mg SC q6h x 3 days   - maintain oxygen sats>92    # Paroxysmal afib  - c/w diltiazem, eliquis    # Acute kidney injury on CKD stage 3  - Creatinine, Serum: 2.7 mg/dL (04.17.20 @ 01:16)  - decrease procardia to 30 mg daily, decrease labetalol to 100 mg q12  - lasix held  - monitor I/o  - Monitor BMP  - renal following    # Hypertension  - c/w procardia, labetalol, diltiazem    # H/o CAD S/p PCI  - c/w ASA, plavix, statin    # DM type2  - Hemoglobin A1C, Whole Blood: 6.8% (04.06.20 @ 05:42)  -  monitor FS  - c/w insulin    # Full code     # Pending: Monitor BMP  # Pt updated on plan of care  # Disposition: TBD

## 2023-02-24 ENCOUNTER — APPOINTMENT (OUTPATIENT)
Dept: OTOLARYNGOLOGY | Facility: CLINIC | Age: 78
End: 2023-02-24
Payer: MEDICARE

## 2023-02-24 PROCEDURE — 99214 OFFICE O/P EST MOD 30 MIN: CPT | Mod: 25

## 2023-02-24 PROCEDURE — 69210 REMOVE IMPACTED EAR WAX UNI: CPT

## 2023-02-24 RX ORDER — OFLOXACIN OTIC 3 MG/ML
0.3 SOLUTION AURICULAR (OTIC)
Qty: 1 | Refills: 0 | Status: ACTIVE | COMMUNITY
Start: 2023-02-24 | End: 1900-01-01

## 2023-02-24 NOTE — PHYSICAL EXAM
[de-identified] : Bilateral impacted wax cleaned with curette. Left otitis externa seen. [Normal] : external appearance is normal

## 2023-03-03 ENCOUNTER — APPOINTMENT (OUTPATIENT)
Dept: OTOLARYNGOLOGY | Facility: CLINIC | Age: 78
End: 2023-03-03
Payer: MEDICARE

## 2023-03-03 PROCEDURE — 69210 REMOVE IMPACTED EAR WAX UNI: CPT

## 2023-03-03 PROCEDURE — 99214 OFFICE O/P EST MOD 30 MIN: CPT | Mod: 25

## 2023-03-03 NOTE — REASON FOR VISIT
[Subsequent Evaluation] : a subsequent evaluation for [FreeTextEntry2] : clogged ears, bilateral hearing loss acute otitis externa of left ear

## 2023-03-03 NOTE — ASSESSMENT
[FreeTextEntry1] : patient to stop floxin tonight.\par \par Discussed a hearing test. Patient will wait.\par \par

## 2023-03-03 NOTE — HISTORY OF PRESENT ILLNESS
[FreeTextEntry1] : Patient following up today on clogged ears, bilateral hearing loss acute otitis externa of left ear.    Patient states his symptoms are improving but his left ear still has a clogged sensation.,  He denies any pain

## 2023-03-03 NOTE — PHYSICAL EXAM
[de-identified] : left ear deposits and wax cleaned with microsuction #7 [Normal] : mucosa is normal [Midline] : trachea located in midline position

## 2023-03-30 NOTE — H&P PST ADULT - OPHTHALMOLOGIC
Pt presents today in clinic for a nurse visit to receive allergy shots. Admin 0.35mL of Vial A into back of LUE. Pt tolerated injections well and left office in good condition after being monitored for 20 minutes in office, no reaction noted. Pt scheduled in one week for next round of allergy injections.   Pt brought own allergy mix from allergist.  CIARA WALLS     
negative

## 2023-04-20 ENCOUNTER — APPOINTMENT (OUTPATIENT)
Dept: OTOLARYNGOLOGY | Facility: CLINIC | Age: 78
End: 2023-04-20
Payer: MEDICARE

## 2023-04-20 DIAGNOSIS — H91.93 UNSPECIFIED HEARING LOSS, BILATERAL: ICD-10-CM

## 2023-04-20 PROCEDURE — 69210 REMOVE IMPACTED EAR WAX UNI: CPT

## 2023-04-20 PROCEDURE — 99213 OFFICE O/P EST LOW 20 MIN: CPT | Mod: 25

## 2023-04-20 NOTE — HISTORY OF PRESENT ILLNESS
[FreeTextEntry1] : Patient following up today on acute otitis externa of left ear .  Patient states his no longer has any otalgia but both his ears feel clogged ,

## 2023-09-05 ENCOUNTER — OUTPATIENT (OUTPATIENT)
Dept: OUTPATIENT SERVICES | Facility: HOSPITAL | Age: 78
LOS: 1 days | End: 2023-09-05
Payer: MEDICARE

## 2023-09-05 ENCOUNTER — RESULT REVIEW (OUTPATIENT)
Age: 78
End: 2023-09-05

## 2023-09-05 DIAGNOSIS — M25.512 PAIN IN LEFT SHOULDER: ICD-10-CM

## 2023-09-05 DIAGNOSIS — R07.9 CHEST PAIN, UNSPECIFIED: ICD-10-CM

## 2023-09-05 DIAGNOSIS — Z95.5 PRESENCE OF CORONARY ANGIOPLASTY IMPLANT AND GRAFT: Chronic | ICD-10-CM

## 2023-09-05 PROCEDURE — 71100 X-RAY EXAM RIBS UNI 2 VIEWS: CPT | Mod: 26,LT

## 2023-09-05 PROCEDURE — 71046 X-RAY EXAM CHEST 2 VIEWS: CPT

## 2023-09-05 PROCEDURE — 71046 X-RAY EXAM CHEST 2 VIEWS: CPT | Mod: 26

## 2023-09-05 PROCEDURE — 73030 X-RAY EXAM OF SHOULDER: CPT | Mod: 26,LT

## 2023-09-05 PROCEDURE — 71100 X-RAY EXAM RIBS UNI 2 VIEWS: CPT | Mod: LT

## 2023-09-05 PROCEDURE — 73030 X-RAY EXAM OF SHOULDER: CPT | Mod: LT

## 2023-09-06 DIAGNOSIS — R07.9 CHEST PAIN, UNSPECIFIED: ICD-10-CM

## 2023-09-06 DIAGNOSIS — M25.512 PAIN IN LEFT SHOULDER: ICD-10-CM

## 2023-09-13 NOTE — ASU PATIENT PROFILE, ADULT - PROVIDER NOTIFICATION
Declines Libtayo Counseling- I discussed with the patient the risks of Libtayo including but not limited to nausea, vomiting, diarrhea, and bone or muscle pain.  The patient verbalized understanding of the proper use and possible adverse effects of Libtayo.  All of the patient's questions and concerns were addressed.

## 2023-10-10 NOTE — CHART NOTE - NSCHARTNOTESELECT_GEN_ALL_CORE
Event Note SSKI Counseling:  I discussed with the patient the risks of SSKI including but not limited to thyroid abnormalities, metallic taste, GI upset, fever, headache, acne, arthralgias, paraesthesias, lymphadenopathy, easy bleeding, arrhythmias, and allergic reaction.

## 2023-10-26 ENCOUNTER — APPOINTMENT (OUTPATIENT)
Dept: OTOLARYNGOLOGY | Facility: CLINIC | Age: 78
End: 2023-10-26
Payer: MEDICARE

## 2023-10-26 DIAGNOSIS — H93.8X3 OTHER SPECIFIED DISORDERS OF EAR, BILATERAL: ICD-10-CM

## 2023-10-26 DIAGNOSIS — H61.23 IMPACTED CERUMEN, BILATERAL: ICD-10-CM

## 2023-10-26 DIAGNOSIS — H60.502 UNSPECIFIED ACUTE NONINFECTIVE OTITIS EXTERNA, LEFT EAR: ICD-10-CM

## 2023-10-26 PROCEDURE — 99214 OFFICE O/P EST MOD 30 MIN: CPT | Mod: 25

## 2023-10-26 PROCEDURE — 69210 REMOVE IMPACTED EAR WAX UNI: CPT

## 2024-05-09 ENCOUNTER — APPOINTMENT (OUTPATIENT)
Dept: OTOLARYNGOLOGY | Facility: CLINIC | Age: 79
End: 2024-05-09

## 2025-01-10 NOTE — PROGRESS NOTE ADULT - ASSESSMENT
74y old Male who presents with a chief complaint of cough x 1 week  Currently admitted to medicine with the primary diagnosis of Acute hypoxic respiratory failure secondary to SARS COv2 pneumonia    IMPRESSION:  # COVID 19 PNA    CXR with diffuse bilateral opacities , almost white out    Procalcitonin, Serum: 0.22 (04-01-20 @ 06:25)  #Cytokine Release Syndrome   Triglycerides, Serum: 88 mg/dL (04-03-20 @ 06:48)  Fibrinogen Assay: 674.0 mg/dL (04-03-20 @ 06:48)  D-Dimer Assay, Quantitative: 4079 ng/mL DDU (04-03-20 @ 06:48)  Ferritin, Serum: 997 ng/mL (04-02-20 @ 06:38)  C-Reactive Protein, Serum: 15.30 mg/dL (04-02-20 @ 06:38)  #hyponatremia    S/P Zithromax & hydroxychloroquine   On anakinra Injectable 100 (hypoxemia on NRB and d-dimer>1000, ferritin >700); IL-6 183  - WBC Count: 17.49 K/uL with Cr 1.7    RECOMMENDATIONS:  Continue Anakinra 100mg SC q6h x 3 days   Repeat CRP, Procalcitonin, ferritin & D-dimer  Repeat Cr 74y old Male who presents with a chief complaint of cough x 1 week  Currently admitted to medicine with the primary diagnosis of Acute hypoxic respiratory failure secondary to SARS COv2 pneumonia    IMPRESSION:  # COVID 19 PNA    CXR with diffuse bilateral opacities , almost white out    Procalcitonin, Serum: 0.22 (04-01-20 @ 06:25)  #Cytokine Release Syndrome   Triglycerides, Serum: 88 mg/dL (04-03-20 @ 06:48)  Fibrinogen Assay: 674.0 mg/dL (04-03-20 @ 06:48)  D-Dimer Assay, Quantitative: 4079 ng/mL DDU (04-03-20 @ 06:48)  Ferritin, Serum: 997 ng/mL (04-02-20 @ 06:38)  C-Reactive Protein, Serum: 15.30 mg/dL (04-02-20 @ 06:38)  #hyponatremia    S/P Zithromax & hydroxychloroquine   On anakinra Injectable 100 (hypoxemia on NRB and d-dimer>1000, ferritin >700); IL-6 183  - WBC Count: 17.49 K/uL (on methylPREDNISolone sodium succinate Injectable )  Pt with increase in Cr 1.7    RECOMMENDATIONS:  Continue Anakinra 100mg SC q6h x 3 days   Repeat CRP, Procalcitonin, ferritin & D-dimer  Repeat Cr Strong peripheral pulses

## 2025-03-10 ENCOUNTER — APPOINTMENT (OUTPATIENT)
Dept: OTOLARYNGOLOGY | Facility: CLINIC | Age: 80
End: 2025-03-10
Payer: MEDICARE

## 2025-03-10 DIAGNOSIS — H61.23 IMPACTED CERUMEN, BILATERAL: ICD-10-CM

## 2025-03-10 DIAGNOSIS — H93.8X3 OTHER SPECIFIED DISORDERS OF EAR, BILATERAL: ICD-10-CM

## 2025-03-10 PROCEDURE — 69210 REMOVE IMPACTED EAR WAX UNI: CPT

## 2025-04-09 NOTE — PROCEDURE NOTE - ATTENDING PROVIDER
April 9, 2025    Hello, may I speak with Brennan Post?    My name is Karmen Reyez      I am  with [facility]  [facility] [address] [address] [phone].    Before we get started may I verify your date of birth? 1969    I am calling to officially welcome you to our practice and ask about your recent visit. Is this a good time to talk? yes    Tell me about your visit with us. What things went well?  all good       We're always looking for ways to make our patients' experiences even better. Do you have recommendations on ways we may improve?  no    Overall were you satisfied with your first visit to our practice? yes       I appreciate you taking the time to speak with me today. Is there anything else I can do for you? no      Thank you, and have a great day.       Dr. Haji

## 2025-07-15 ENCOUNTER — RESULT REVIEW (OUTPATIENT)
Age: 80
End: 2025-07-15

## 2025-07-15 ENCOUNTER — OUTPATIENT (OUTPATIENT)
Dept: OUTPATIENT SERVICES | Facility: HOSPITAL | Age: 80
LOS: 1 days | End: 2025-07-15
Payer: MEDICARE

## 2025-07-15 DIAGNOSIS — R52 PAIN, UNSPECIFIED: ICD-10-CM

## 2025-07-15 DIAGNOSIS — Z95.5 PRESENCE OF CORONARY ANGIOPLASTY IMPLANT AND GRAFT: Chronic | ICD-10-CM

## 2025-07-15 PROCEDURE — 73562 X-RAY EXAM OF KNEE 3: CPT | Mod: 26,RT

## 2025-07-15 PROCEDURE — 73562 X-RAY EXAM OF KNEE 3: CPT | Mod: RT

## 2025-07-16 DIAGNOSIS — R52 PAIN, UNSPECIFIED: ICD-10-CM

## 2025-09-08 ENCOUNTER — APPOINTMENT (OUTPATIENT)
Dept: OTOLARYNGOLOGY | Facility: CLINIC | Age: 80
End: 2025-09-08
Payer: MEDICARE

## 2025-09-08 VITALS — BODY MASS INDEX: 31.83 KG/M2 | WEIGHT: 210 LBS | HEIGHT: 68 IN

## 2025-09-08 DIAGNOSIS — H93.8X3 OTHER SPECIFIED DISORDERS OF EAR, BILATERAL: ICD-10-CM

## 2025-09-08 DIAGNOSIS — H91.93 UNSPECIFIED HEARING LOSS, BILATERAL: ICD-10-CM

## 2025-09-08 DIAGNOSIS — H61.23 IMPACTED CERUMEN, BILATERAL: ICD-10-CM

## 2025-09-08 PROCEDURE — 69210 REMOVE IMPACTED EAR WAX UNI: CPT
